# Patient Record
Sex: MALE | Race: WHITE | NOT HISPANIC OR LATINO | Employment: UNEMPLOYED | URBAN - METROPOLITAN AREA
[De-identification: names, ages, dates, MRNs, and addresses within clinical notes are randomized per-mention and may not be internally consistent; named-entity substitution may affect disease eponyms.]

---

## 2019-04-10 ENCOUNTER — APPOINTMENT (EMERGENCY)
Dept: RADIOLOGY | Facility: HOSPITAL | Age: 59
DRG: 263 | End: 2019-04-10
Payer: COMMERCIAL

## 2019-04-10 ENCOUNTER — HOSPITAL ENCOUNTER (INPATIENT)
Facility: HOSPITAL | Age: 59
LOS: 7 days | Discharge: HOME | DRG: 263 | End: 2019-04-18
Attending: EMERGENCY MEDICINE | Admitting: FAMILY MEDICINE
Payer: COMMERCIAL

## 2019-04-10 DIAGNOSIS — R10.9 ABDOMINAL PAIN: ICD-10-CM

## 2019-04-10 DIAGNOSIS — R11.2 NAUSEA AND VOMITING: ICD-10-CM

## 2019-04-10 DIAGNOSIS — K85.10 GALLSTONE PANCREATITIS: ICD-10-CM

## 2019-04-10 DIAGNOSIS — K85.90 PANCREATITIS: Primary | ICD-10-CM

## 2019-04-10 LAB
ALBUMIN SERPL BCP-MCNC: 3.9 G/DL (ref 3.5–5)
ALP SERPL-CCNC: 394 U/L (ref 46–116)
ALT SERPL W P-5'-P-CCNC: 582 U/L (ref 12–78)
ANION GAP SERPL CALCULATED.3IONS-SCNC: 13 MMOL/L (ref 4–13)
AST SERPL W P-5'-P-CCNC: 394 U/L (ref 5–45)
BASOPHILS # BLD AUTO: 0.03 THOUSANDS/ΜL (ref 0–0.1)
BASOPHILS NFR BLD AUTO: 0 % (ref 0–1)
BILIRUB SERPL-MCNC: 4.2 MG/DL (ref 0.2–1)
BUN SERPL-MCNC: 18 MG/DL (ref 5–25)
CALCIUM SERPL-MCNC: 9.7 MG/DL (ref 8.3–10.1)
CHLORIDE SERPL-SCNC: 103 MMOL/L (ref 100–108)
CO2 SERPL-SCNC: 26 MMOL/L (ref 21–32)
CREAT SERPL-MCNC: 1.3 MG/DL (ref 0.6–1.3)
EOSINOPHIL # BLD AUTO: 0.17 THOUSAND/ΜL (ref 0–0.61)
EOSINOPHIL NFR BLD AUTO: 1 % (ref 0–6)
ERYTHROCYTE [DISTWIDTH] IN BLOOD BY AUTOMATED COUNT: 15.3 % (ref 11.6–15.1)
GFR SERPL CREATININE-BSD FRML MDRD: 60 ML/MIN/1.73SQ M
GLUCOSE SERPL-MCNC: 113 MG/DL (ref 65–140)
HCT VFR BLD AUTO: 45.8 % (ref 36.5–49.3)
HGB BLD-MCNC: 15.8 G/DL (ref 12–17)
LACTATE SERPL-SCNC: 2.4 MMOL/L (ref 0.5–2)
LIPASE SERPL-CCNC: ABNORMAL U/L (ref 73–393)
LYMPHOCYTES # BLD AUTO: 4.46 THOUSANDS/ΜL (ref 0.6–4.47)
LYMPHOCYTES NFR BLD AUTO: 37 % (ref 14–44)
MCH RBC QN AUTO: 30 PG (ref 26.8–34.3)
MCHC RBC AUTO-ENTMCNC: 34.5 G/DL (ref 31.4–37.4)
MCV RBC AUTO: 87 FL (ref 82–98)
MONOCYTES # BLD AUTO: 1.01 THOUSAND/ΜL (ref 0.17–1.22)
MONOCYTES NFR BLD AUTO: 8 % (ref 4–12)
NEUTROPHILS # BLD AUTO: 6.43 THOUSANDS/ΜL (ref 1.85–7.62)
NEUTS SEG NFR BLD AUTO: 54 % (ref 43–75)
PLATELET # BLD AUTO: 286 THOUSANDS/UL (ref 149–390)
PMV BLD AUTO: 9.7 FL (ref 8.9–12.7)
POTASSIUM SERPL-SCNC: 3.2 MMOL/L (ref 3.5–5.3)
PROT SERPL-MCNC: 7.6 G/DL (ref 6.4–8.2)
RBC # BLD AUTO: 5.27 MILLION/UL (ref 3.88–5.62)
SODIUM SERPL-SCNC: 142 MMOL/L (ref 136–145)
TROPONIN I SERPL-MCNC: <0.02 NG/ML
WBC # BLD AUTO: 12.1 THOUSAND/UL (ref 4.31–10.16)

## 2019-04-10 PROCEDURE — 99285 EMERGENCY DEPT VISIT HI MDM: CPT

## 2019-04-10 PROCEDURE — 84484 ASSAY OF TROPONIN QUANT: CPT | Performed by: EMERGENCY MEDICINE

## 2019-04-10 PROCEDURE — 83690 ASSAY OF LIPASE: CPT | Performed by: EMERGENCY MEDICINE

## 2019-04-10 PROCEDURE — 96375 TX/PRO/DX INJ NEW DRUG ADDON: CPT

## 2019-04-10 PROCEDURE — 36415 COLL VENOUS BLD VENIPUNCTURE: CPT | Performed by: EMERGENCY MEDICINE

## 2019-04-10 PROCEDURE — 36415 COLL VENOUS BLD VENIPUNCTURE: CPT

## 2019-04-10 PROCEDURE — 83615 LACTATE (LD) (LDH) ENZYME: CPT | Performed by: STUDENT IN AN ORGANIZED HEALTH CARE EDUCATION/TRAINING PROGRAM

## 2019-04-10 PROCEDURE — 80053 COMPREHEN METABOLIC PANEL: CPT | Performed by: EMERGENCY MEDICINE

## 2019-04-10 PROCEDURE — 74177 CT ABD & PELVIS W/CONTRAST: CPT

## 2019-04-10 PROCEDURE — 80061 LIPID PANEL: CPT | Performed by: STUDENT IN AN ORGANIZED HEALTH CARE EDUCATION/TRAINING PROGRAM

## 2019-04-10 PROCEDURE — 96374 THER/PROPH/DIAG INJ IV PUSH: CPT

## 2019-04-10 PROCEDURE — 83036 HEMOGLOBIN GLYCOSYLATED A1C: CPT | Performed by: STUDENT IN AN ORGANIZED HEALTH CARE EDUCATION/TRAINING PROGRAM

## 2019-04-10 PROCEDURE — 93005 ELECTROCARDIOGRAM TRACING: CPT

## 2019-04-10 PROCEDURE — 83605 ASSAY OF LACTIC ACID: CPT | Performed by: EMERGENCY MEDICINE

## 2019-04-10 PROCEDURE — 85025 COMPLETE CBC W/AUTO DIFF WBC: CPT | Performed by: EMERGENCY MEDICINE

## 2019-04-10 PROCEDURE — 96361 HYDRATE IV INFUSION ADD-ON: CPT

## 2019-04-10 RX ORDER — KETOROLAC TROMETHAMINE 30 MG/ML
15 INJECTION, SOLUTION INTRAMUSCULAR; INTRAVENOUS ONCE
Status: COMPLETED | OUTPATIENT
Start: 2019-04-10 | End: 2019-04-10

## 2019-04-10 RX ORDER — ONDANSETRON 2 MG/ML
4 INJECTION INTRAMUSCULAR; INTRAVENOUS ONCE
Status: COMPLETED | OUTPATIENT
Start: 2019-04-10 | End: 2019-04-10

## 2019-04-10 RX ORDER — MORPHINE SULFATE 4 MG/ML
4 INJECTION, SOLUTION INTRAMUSCULAR; INTRAVENOUS ONCE
Status: COMPLETED | OUTPATIENT
Start: 2019-04-10 | End: 2019-04-10

## 2019-04-10 RX ORDER — ONDANSETRON 2 MG/ML
INJECTION INTRAMUSCULAR; INTRAVENOUS
Status: COMPLETED
Start: 2019-04-10 | End: 2019-04-10

## 2019-04-10 RX ORDER — HYDROMORPHONE HCL/PF 1 MG/ML
1 SYRINGE (ML) INJECTION ONCE
Status: COMPLETED | OUTPATIENT
Start: 2019-04-11 | End: 2019-04-11

## 2019-04-10 RX ADMIN — SODIUM CHLORIDE 1000 ML: 0.9 INJECTION, SOLUTION INTRAVENOUS at 22:06

## 2019-04-10 RX ADMIN — ONDANSETRON 4 MG: 2 INJECTION INTRAMUSCULAR; INTRAVENOUS at 22:05

## 2019-04-10 RX ADMIN — KETOROLAC TROMETHAMINE 15 MG: 30 INJECTION, SOLUTION INTRAMUSCULAR at 22:31

## 2019-04-10 RX ADMIN — IOHEXOL 100 ML: 350 INJECTION, SOLUTION INTRAVENOUS at 23:02

## 2019-04-10 RX ADMIN — MORPHINE SULFATE 4 MG: 4 INJECTION INTRAVENOUS at 22:32

## 2019-04-11 ENCOUNTER — APPOINTMENT (INPATIENT)
Dept: RADIOLOGY | Facility: HOSPITAL | Age: 59
DRG: 263 | End: 2019-04-11
Payer: COMMERCIAL

## 2019-04-11 PROBLEM — K92.0 HEMATEMESIS: Status: ACTIVE | Noted: 2019-04-11

## 2019-04-11 PROBLEM — R00.1 BRADYCARDIA: Status: ACTIVE | Noted: 2019-04-11

## 2019-04-11 PROBLEM — K85.10 GALLSTONE PANCREATITIS: Status: ACTIVE | Noted: 2019-04-10

## 2019-04-11 PROBLEM — K85.90 PANCREATITIS: Status: ACTIVE | Noted: 2019-04-11

## 2019-04-11 LAB
ALBUMIN SERPL BCP-MCNC: 3.1 G/DL (ref 3.5–5)
ALP SERPL-CCNC: 321 U/L (ref 46–116)
ALT SERPL W P-5'-P-CCNC: 478 U/L (ref 12–78)
ANION GAP SERPL CALCULATED.3IONS-SCNC: 8 MMOL/L (ref 4–13)
AST SERPL W P-5'-P-CCNC: 288 U/L (ref 5–45)
BASOPHILS # BLD AUTO: 0.01 THOUSANDS/ΜL (ref 0–0.1)
BASOPHILS NFR BLD AUTO: 0 % (ref 0–1)
BILIRUB SERPL-MCNC: 3.6 MG/DL (ref 0.2–1)
BILIRUB UR QL STRIP: NEGATIVE
BUN SERPL-MCNC: 17 MG/DL (ref 5–25)
CALCIUM SERPL-MCNC: 8.6 MG/DL (ref 8.3–10.1)
CHLORIDE SERPL-SCNC: 107 MMOL/L (ref 100–108)
CHOLEST SERPL-MCNC: 237 MG/DL (ref 50–200)
CLARITY UR: CLEAR
CO2 SERPL-SCNC: 26 MMOL/L (ref 21–32)
COLOR UR: YELLOW
CREAT SERPL-MCNC: 1.21 MG/DL (ref 0.6–1.3)
EOSINOPHIL # BLD AUTO: 0 THOUSAND/ΜL (ref 0–0.61)
EOSINOPHIL NFR BLD AUTO: 0 % (ref 0–6)
ERYTHROCYTE [DISTWIDTH] IN BLOOD BY AUTOMATED COUNT: 14.8 % (ref 11.6–15.1)
EST. AVERAGE GLUCOSE BLD GHB EST-MCNC: 103 MG/DL
GFR SERPL CREATININE-BSD FRML MDRD: 65 ML/MIN/1.73SQ M
GLUCOSE SERPL-MCNC: 121 MG/DL (ref 65–140)
GLUCOSE UR STRIP-MCNC: NEGATIVE MG/DL
HBA1C MFR BLD: 5.2 % (ref 4.2–6.3)
HCT VFR BLD AUTO: 44.5 % (ref 36.5–49.3)
HDLC SERPL-MCNC: 34 MG/DL (ref 40–60)
HGB BLD-MCNC: 14.3 G/DL (ref 12–17)
HGB UR QL STRIP.AUTO: NEGATIVE
IMM GRANULOCYTES # BLD AUTO: 0.03 THOUSAND/UL (ref 0–0.2)
IMM GRANULOCYTES NFR BLD AUTO: 0 % (ref 0–2)
KETONES UR STRIP-MCNC: ABNORMAL MG/DL
LACTATE SERPL-SCNC: 1.4 MMOL/L (ref 0.5–2)
LDH SERPL-CCNC: 313 U/L (ref 81–234)
LDLC SERPL CALC-MCNC: 181 MG/DL (ref 0–100)
LEUKOCYTE ESTERASE UR QL STRIP: NEGATIVE
LIPASE SERPL-CCNC: ABNORMAL U/L (ref 73–393)
LYMPHOCYTES # BLD AUTO: 1.06 THOUSANDS/ΜL (ref 0.6–4.47)
LYMPHOCYTES NFR BLD AUTO: 9 % (ref 14–44)
MAGNESIUM SERPL-MCNC: 2 MG/DL (ref 1.6–2.6)
MCH RBC QN AUTO: 29.4 PG (ref 26.8–34.3)
MCHC RBC AUTO-ENTMCNC: 32.1 G/DL (ref 31.4–37.4)
MCV RBC AUTO: 92 FL (ref 82–98)
MONOCYTES # BLD AUTO: 0.75 THOUSAND/ΜL (ref 0.17–1.22)
MONOCYTES NFR BLD AUTO: 6 % (ref 4–12)
NEUTROPHILS # BLD AUTO: 10.32 THOUSANDS/ΜL (ref 1.85–7.62)
NEUTS SEG NFR BLD AUTO: 85 % (ref 43–75)
NITRITE UR QL STRIP: NEGATIVE
NRBC BLD AUTO-RTO: 0 /100 WBCS
PH UR STRIP.AUTO: 6.5 [PH]
PHOSPHATE SERPL-MCNC: 4 MG/DL (ref 2.7–4.5)
PLATELET # BLD AUTO: 204 THOUSANDS/UL (ref 149–390)
PMV BLD AUTO: 10 FL (ref 8.9–12.7)
POTASSIUM SERPL-SCNC: 4.8 MMOL/L (ref 3.5–5.3)
PROT SERPL-MCNC: 6.3 G/DL (ref 6.4–8.2)
PROT UR STRIP-MCNC: NEGATIVE MG/DL
RBC # BLD AUTO: 4.86 MILLION/UL (ref 3.88–5.62)
SODIUM SERPL-SCNC: 141 MMOL/L (ref 136–145)
SP GR UR STRIP.AUTO: <=1.005 (ref 1–1.03)
TRIGL SERPL-MCNC: 109 MG/DL
UROBILINOGEN UR QL STRIP.AUTO: 1 E.U./DL
WBC # BLD AUTO: 12.17 THOUSAND/UL (ref 4.31–10.16)

## 2019-04-11 PROCEDURE — 83735 ASSAY OF MAGNESIUM: CPT | Performed by: STUDENT IN AN ORGANIZED HEALTH CARE EDUCATION/TRAINING PROGRAM

## 2019-04-11 PROCEDURE — 81003 URINALYSIS AUTO W/O SCOPE: CPT | Performed by: EMERGENCY MEDICINE

## 2019-04-11 PROCEDURE — 84100 ASSAY OF PHOSPHORUS: CPT | Performed by: STUDENT IN AN ORGANIZED HEALTH CARE EDUCATION/TRAINING PROGRAM

## 2019-04-11 PROCEDURE — 99253 IP/OBS CNSLTJ NEW/EST LOW 45: CPT | Performed by: FAMILY MEDICINE

## 2019-04-11 PROCEDURE — 87081 CULTURE SCREEN ONLY: CPT | Performed by: STUDENT IN AN ORGANIZED HEALTH CARE EDUCATION/TRAINING PROGRAM

## 2019-04-11 PROCEDURE — 80053 COMPREHEN METABOLIC PANEL: CPT | Performed by: STUDENT IN AN ORGANIZED HEALTH CARE EDUCATION/TRAINING PROGRAM

## 2019-04-11 PROCEDURE — 83605 ASSAY OF LACTIC ACID: CPT | Performed by: EMERGENCY MEDICINE

## 2019-04-11 PROCEDURE — 36415 COLL VENOUS BLD VENIPUNCTURE: CPT | Performed by: EMERGENCY MEDICINE

## 2019-04-11 PROCEDURE — 85025 COMPLETE CBC W/AUTO DIFF WBC: CPT | Performed by: STUDENT IN AN ORGANIZED HEALTH CARE EDUCATION/TRAINING PROGRAM

## 2019-04-11 PROCEDURE — 76705 ECHO EXAM OF ABDOMEN: CPT

## 2019-04-11 PROCEDURE — 99222 1ST HOSP IP/OBS MODERATE 55: CPT | Performed by: INTERNAL MEDICINE

## 2019-04-11 PROCEDURE — 83690 ASSAY OF LIPASE: CPT | Performed by: STUDENT IN AN ORGANIZED HEALTH CARE EDUCATION/TRAINING PROGRAM

## 2019-04-11 RX ORDER — POTASSIUM CHLORIDE 14.9 MG/ML
20 INJECTION INTRAVENOUS
Status: COMPLETED | OUTPATIENT
Start: 2019-04-11 | End: 2019-04-11

## 2019-04-11 RX ORDER — SACCHAROMYCES BOULARDII 250 MG
250 CAPSULE ORAL 2 TIMES DAILY
Status: DISCONTINUED | OUTPATIENT
Start: 2019-04-11 | End: 2019-04-18 | Stop reason: HOSPADM

## 2019-04-11 RX ORDER — CIPROFLOXACIN 2 MG/ML
400 INJECTION, SOLUTION INTRAVENOUS EVERY 12 HOURS
Status: DISCONTINUED | OUTPATIENT
Start: 2019-04-11 | End: 2019-04-15

## 2019-04-11 RX ORDER — IBUPROFEN 400 MG/1
400 TABLET ORAL DAILY PRN
COMMUNITY
End: 2019-04-18 | Stop reason: HOSPADM

## 2019-04-11 RX ORDER — ONDANSETRON 2 MG/ML
4 INJECTION INTRAMUSCULAR; INTRAVENOUS EVERY 6 HOURS PRN
Status: DISCONTINUED | OUTPATIENT
Start: 2019-04-11 | End: 2019-04-11

## 2019-04-11 RX ORDER — DIPHENOXYLATE HYDROCHLORIDE AND ATROPINE SULFATE 2.5; .025 MG/1; MG/1
1 TABLET ORAL 2 TIMES WEEKLY
COMMUNITY

## 2019-04-11 RX ORDER — ONDANSETRON 2 MG/ML
4 INJECTION INTRAMUSCULAR; INTRAVENOUS EVERY 4 HOURS PRN
Status: DISCONTINUED | OUTPATIENT
Start: 2019-04-11 | End: 2019-04-18 | Stop reason: HOSPADM

## 2019-04-11 RX ORDER — SODIUM CHLORIDE, SODIUM LACTATE, POTASSIUM CHLORIDE, CALCIUM CHLORIDE 600; 310; 30; 20 MG/100ML; MG/100ML; MG/100ML; MG/100ML
250 INJECTION, SOLUTION INTRAVENOUS CONTINUOUS
Status: DISCONTINUED | OUTPATIENT
Start: 2019-04-11 | End: 2019-04-12

## 2019-04-11 RX ADMIN — CIPROFLOXACIN 400 MG: 2 INJECTION, SOLUTION INTRAVENOUS at 14:03

## 2019-04-11 RX ADMIN — POTASSIUM CHLORIDE 20 MEQ: 200 INJECTION, SOLUTION INTRAVENOUS at 03:44

## 2019-04-11 RX ADMIN — SODIUM CHLORIDE, SODIUM LACTATE, POTASSIUM CHLORIDE, AND CALCIUM CHLORIDE 250 ML/HR: .6; .31; .03; .02 INJECTION, SOLUTION INTRAVENOUS at 01:32

## 2019-04-11 RX ADMIN — ONDANSETRON 4 MG: 2 INJECTION INTRAMUSCULAR; INTRAVENOUS at 05:25

## 2019-04-11 RX ADMIN — METRONIDAZOLE 500 MG: 500 INJECTION, SOLUTION INTRAVENOUS at 15:44

## 2019-04-11 RX ADMIN — SODIUM CHLORIDE, SODIUM LACTATE, POTASSIUM CHLORIDE, AND CALCIUM CHLORIDE 250 ML/HR: .6; .31; .03; .02 INJECTION, SOLUTION INTRAVENOUS at 19:54

## 2019-04-11 RX ADMIN — METRONIDAZOLE 500 MG: 500 INJECTION, SOLUTION INTRAVENOUS at 22:22

## 2019-04-11 RX ADMIN — ONDANSETRON 4 MG: 2 INJECTION INTRAMUSCULAR; INTRAVENOUS at 18:55

## 2019-04-11 RX ADMIN — HYDROMORPHONE HYDROCHLORIDE 1 MG: 1 INJECTION, SOLUTION INTRAMUSCULAR; INTRAVENOUS; SUBCUTANEOUS at 00:09

## 2019-04-11 RX ADMIN — MORPHINE SULFATE 2 MG: 2 INJECTION, SOLUTION INTRAMUSCULAR; INTRAVENOUS at 05:29

## 2019-04-11 RX ADMIN — Medication 250 MG: at 18:55

## 2019-04-11 RX ADMIN — MORPHINE SULFATE 2 MG: 2 INJECTION, SOLUTION INTRAMUSCULAR; INTRAVENOUS at 01:59

## 2019-04-11 RX ADMIN — MORPHINE SULFATE 2 MG: 2 INJECTION, SOLUTION INTRAMUSCULAR; INTRAVENOUS at 15:42

## 2019-04-11 RX ADMIN — POTASSIUM CHLORIDE 20 MEQ: 200 INJECTION, SOLUTION INTRAVENOUS at 05:25

## 2019-04-11 RX ADMIN — SODIUM CHLORIDE, SODIUM LACTATE, POTASSIUM CHLORIDE, AND CALCIUM CHLORIDE 250 ML/HR: .6; .31; .03; .02 INJECTION, SOLUTION INTRAVENOUS at 09:40

## 2019-04-11 RX ADMIN — ONDANSETRON 4 MG: 2 INJECTION INTRAMUSCULAR; INTRAVENOUS at 10:15

## 2019-04-11 RX ADMIN — SODIUM CHLORIDE, SODIUM LACTATE, POTASSIUM CHLORIDE, AND CALCIUM CHLORIDE 250 ML/HR: .6; .31; .03; .02 INJECTION, SOLUTION INTRAVENOUS at 14:04

## 2019-04-11 RX ADMIN — FAMOTIDINE 20 MG: 10 INJECTION, SOLUTION INTRAVENOUS at 16:56

## 2019-04-11 RX ADMIN — MORPHINE SULFATE 2 MG: 2 INJECTION, SOLUTION INTRAMUSCULAR; INTRAVENOUS at 11:22

## 2019-04-11 RX ADMIN — SODIUM CHLORIDE, SODIUM LACTATE, POTASSIUM CHLORIDE, AND CALCIUM CHLORIDE 250 ML/HR: .6; .31; .03; .02 INJECTION, SOLUTION INTRAVENOUS at 05:25

## 2019-04-11 RX ADMIN — MORPHINE SULFATE 2 MG: 2 INJECTION, SOLUTION INTRAMUSCULAR; INTRAVENOUS at 20:17

## 2019-04-11 RX ADMIN — POTASSIUM CHLORIDE 20 MEQ: 200 INJECTION, SOLUTION INTRAVENOUS at 01:36

## 2019-04-12 ENCOUNTER — APPOINTMENT (INPATIENT)
Dept: RADIOLOGY | Facility: HOSPITAL | Age: 59
DRG: 263 | End: 2019-04-12
Payer: COMMERCIAL

## 2019-04-12 LAB
ALBUMIN SERPL BCP-MCNC: 2.6 G/DL (ref 3.5–5)
ALP SERPL-CCNC: 248 U/L (ref 46–116)
ALT SERPL W P-5'-P-CCNC: 328 U/L (ref 12–78)
ANION GAP SERPL CALCULATED.3IONS-SCNC: 7 MMOL/L (ref 4–13)
AST SERPL W P-5'-P-CCNC: 127 U/L (ref 5–45)
ATRIAL RATE: 53 BPM
BASOPHILS # BLD MANUAL: 0 THOUSAND/UL (ref 0–0.1)
BASOPHILS NFR MAR MANUAL: 0 % (ref 0–1)
BILIRUB SERPL-MCNC: 2.6 MG/DL (ref 0.2–1)
BUN SERPL-MCNC: 16 MG/DL (ref 5–25)
CALCIUM SERPL-MCNC: 8.2 MG/DL (ref 8.3–10.1)
CHLORIDE SERPL-SCNC: 104 MMOL/L (ref 100–108)
CO2 SERPL-SCNC: 26 MMOL/L (ref 21–32)
CREAT SERPL-MCNC: 1.07 MG/DL (ref 0.6–1.3)
EOSINOPHIL # BLD MANUAL: 0 THOUSAND/UL (ref 0–0.4)
EOSINOPHIL NFR BLD MANUAL: 0 % (ref 0–6)
ERYTHROCYTE [DISTWIDTH] IN BLOOD BY AUTOMATED COUNT: 15.2 % (ref 11.6–15.1)
GFR SERPL CREATININE-BSD FRML MDRD: 76 ML/MIN/1.73SQ M
GLUCOSE SERPL-MCNC: 112 MG/DL (ref 65–140)
HCT VFR BLD AUTO: 45.6 % (ref 36.5–49.3)
HGB BLD-MCNC: 14.7 G/DL (ref 12–17)
LIPASE SERPL-CCNC: 4866 U/L (ref 73–393)
LYMPHOCYTES # BLD AUTO: 1 THOUSAND/UL (ref 0.6–4.47)
LYMPHOCYTES # BLD AUTO: 4 % (ref 14–44)
MAGNESIUM SERPL-MCNC: 1.7 MG/DL (ref 1.6–2.6)
MCH RBC QN AUTO: 29.4 PG (ref 26.8–34.3)
MCHC RBC AUTO-ENTMCNC: 32.2 G/DL (ref 31.4–37.4)
MCV RBC AUTO: 91 FL (ref 82–98)
METAMYELOCYTES NFR BLD MANUAL: 2 % (ref 0–1)
MONOCYTES # BLD AUTO: 1.5 THOUSAND/UL (ref 0–1.22)
MONOCYTES NFR BLD: 6 % (ref 4–12)
MRSA NOSE QL CULT: NORMAL
NEUTROPHILS # BLD MANUAL: 20.95 THOUSAND/UL (ref 1.85–7.62)
NEUTS BAND NFR BLD MANUAL: 11 % (ref 0–8)
NEUTS SEG NFR BLD AUTO: 73 % (ref 43–75)
NRBC BLD AUTO-RTO: 0 /100 WBCS
P AXIS: 26 DEGREES
PHOSPHATE SERPL-MCNC: 3 MG/DL (ref 2.7–4.5)
PLATELET # BLD AUTO: 200 THOUSANDS/UL (ref 149–390)
PLATELET BLD QL SMEAR: ADEQUATE
PMV BLD AUTO: 10.8 FL (ref 8.9–12.7)
POTASSIUM SERPL-SCNC: 4.4 MMOL/L (ref 3.5–5.3)
PR INTERVAL: 148 MS
PROT SERPL-MCNC: 5.8 G/DL (ref 6.4–8.2)
QRS AXIS: -32 DEGREES
QRSD INTERVAL: 92 MS
QT INTERVAL: 474 MS
QTC INTERVAL: 444 MS
RBC # BLD AUTO: 5 MILLION/UL (ref 3.88–5.62)
SODIUM SERPL-SCNC: 137 MMOL/L (ref 136–145)
T WAVE AXIS: 2 DEGREES
TOTAL CELLS COUNTED SPEC: 100
VARIANT LYMPHS # BLD AUTO: 4 %
VENTRICULAR RATE: 53 BPM
WBC # BLD AUTO: 24.94 THOUSAND/UL (ref 4.31–10.16)

## 2019-04-12 PROCEDURE — 85007 BL SMEAR W/DIFF WBC COUNT: CPT | Performed by: STUDENT IN AN ORGANIZED HEALTH CARE EDUCATION/TRAINING PROGRAM

## 2019-04-12 PROCEDURE — 99232 SBSQ HOSP IP/OBS MODERATE 35: CPT | Performed by: INTERNAL MEDICINE

## 2019-04-12 PROCEDURE — 80053 COMPREHEN METABOLIC PANEL: CPT | Performed by: STUDENT IN AN ORGANIZED HEALTH CARE EDUCATION/TRAINING PROGRAM

## 2019-04-12 PROCEDURE — 84100 ASSAY OF PHOSPHORUS: CPT | Performed by: STUDENT IN AN ORGANIZED HEALTH CARE EDUCATION/TRAINING PROGRAM

## 2019-04-12 PROCEDURE — 83735 ASSAY OF MAGNESIUM: CPT | Performed by: STUDENT IN AN ORGANIZED HEALTH CARE EDUCATION/TRAINING PROGRAM

## 2019-04-12 PROCEDURE — 83690 ASSAY OF LIPASE: CPT | Performed by: STUDENT IN AN ORGANIZED HEALTH CARE EDUCATION/TRAINING PROGRAM

## 2019-04-12 PROCEDURE — 74183 MRI ABD W/O CNTR FLWD CNTR: CPT

## 2019-04-12 PROCEDURE — 93010 ELECTROCARDIOGRAM REPORT: CPT | Performed by: INTERNAL MEDICINE

## 2019-04-12 PROCEDURE — A9585 GADOBUTROL INJECTION: HCPCS | Performed by: PHYSICIAN ASSISTANT

## 2019-04-12 PROCEDURE — 85027 COMPLETE CBC AUTOMATED: CPT | Performed by: STUDENT IN AN ORGANIZED HEALTH CARE EDUCATION/TRAINING PROGRAM

## 2019-04-12 PROCEDURE — 99254 IP/OBS CNSLTJ NEW/EST MOD 60: CPT | Performed by: SURGERY

## 2019-04-12 PROCEDURE — 99232 SBSQ HOSP IP/OBS MODERATE 35: CPT | Performed by: FAMILY MEDICINE

## 2019-04-12 RX ORDER — PANTOPRAZOLE SODIUM 40 MG/1
40 TABLET, DELAYED RELEASE ORAL
Status: DISCONTINUED | OUTPATIENT
Start: 2019-04-12 | End: 2019-04-18 | Stop reason: HOSPADM

## 2019-04-12 RX ORDER — DEXTROSE, SODIUM CHLORIDE, SODIUM LACTATE, POTASSIUM CHLORIDE, AND CALCIUM CHLORIDE 5; .6; .31; .03; .02 G/100ML; G/100ML; G/100ML; G/100ML; G/100ML
250 INJECTION, SOLUTION INTRAVENOUS CONTINUOUS
Status: DISCONTINUED | OUTPATIENT
Start: 2019-04-12 | End: 2019-04-13

## 2019-04-12 RX ADMIN — ONDANSETRON 4 MG: 2 INJECTION INTRAMUSCULAR; INTRAVENOUS at 23:05

## 2019-04-12 RX ADMIN — METRONIDAZOLE 500 MG: 500 INJECTION, SOLUTION INTRAVENOUS at 21:16

## 2019-04-12 RX ADMIN — Medication 250 MG: at 17:21

## 2019-04-12 RX ADMIN — DEXTROSE, SODIUM CHLORIDE, SODIUM LACTATE, POTASSIUM CHLORIDE, AND CALCIUM CHLORIDE 250 ML/HR: 5; .6; .31; .03; .02 INJECTION, SOLUTION INTRAVENOUS at 23:06

## 2019-04-12 RX ADMIN — Medication 250 MG: at 10:31

## 2019-04-12 RX ADMIN — METRONIDAZOLE 500 MG: 500 INJECTION, SOLUTION INTRAVENOUS at 06:27

## 2019-04-12 RX ADMIN — DEXTROSE, SODIUM CHLORIDE, SODIUM LACTATE, POTASSIUM CHLORIDE, AND CALCIUM CHLORIDE 200 ML/HR: 5; .6; .31; .03; .02 INJECTION, SOLUTION INTRAVENOUS at 11:32

## 2019-04-12 RX ADMIN — CIPROFLOXACIN 400 MG: 2 INJECTION, SOLUTION INTRAVENOUS at 13:32

## 2019-04-12 RX ADMIN — CIPROFLOXACIN 400 MG: 2 INJECTION, SOLUTION INTRAVENOUS at 01:52

## 2019-04-12 RX ADMIN — PANTOPRAZOLE SODIUM 40 MG: 40 TABLET, DELAYED RELEASE ORAL at 17:21

## 2019-04-12 RX ADMIN — MORPHINE SULFATE 2 MG: 2 INJECTION, SOLUTION INTRAMUSCULAR; INTRAVENOUS at 04:07

## 2019-04-12 RX ADMIN — GADOBUTROL 10 ML: 604.72 INJECTION INTRAVENOUS at 09:28

## 2019-04-12 RX ADMIN — MORPHINE SULFATE 1 MG: 2 INJECTION, SOLUTION INTRAMUSCULAR; INTRAVENOUS at 17:55

## 2019-04-12 RX ADMIN — SODIUM CHLORIDE, SODIUM LACTATE, POTASSIUM CHLORIDE, AND CALCIUM CHLORIDE 250 ML/HR: .6; .31; .03; .02 INJECTION, SOLUTION INTRAVENOUS at 06:22

## 2019-04-12 RX ADMIN — METRONIDAZOLE 500 MG: 500 INJECTION, SOLUTION INTRAVENOUS at 15:05

## 2019-04-12 RX ADMIN — FAMOTIDINE 20 MG: 10 INJECTION, SOLUTION INTRAVENOUS at 02:18

## 2019-04-13 ENCOUNTER — APPOINTMENT (INPATIENT)
Dept: RADIOLOGY | Facility: HOSPITAL | Age: 59
DRG: 263 | End: 2019-04-13
Payer: COMMERCIAL

## 2019-04-13 LAB
ALBUMIN SERPL BCP-MCNC: 2.2 G/DL (ref 3.5–5)
ALP SERPL-CCNC: 222 U/L (ref 46–116)
ALT SERPL W P-5'-P-CCNC: 199 U/L (ref 12–78)
ANION GAP SERPL CALCULATED.3IONS-SCNC: 7 MMOL/L (ref 4–13)
AST SERPL W P-5'-P-CCNC: 57 U/L (ref 5–45)
BASOPHILS # BLD AUTO: 0.04 THOUSANDS/ΜL (ref 0–0.1)
BASOPHILS NFR BLD AUTO: 0 % (ref 0–1)
BILIRUB SERPL-MCNC: 3 MG/DL (ref 0.2–1)
BUN SERPL-MCNC: 15 MG/DL (ref 5–25)
CALCIUM SERPL-MCNC: 8.1 MG/DL (ref 8.3–10.1)
CHLORIDE SERPL-SCNC: 103 MMOL/L (ref 100–108)
CO2 SERPL-SCNC: 25 MMOL/L (ref 21–32)
CREAT SERPL-MCNC: 0.97 MG/DL (ref 0.6–1.3)
EOSINOPHIL # BLD AUTO: 0 THOUSAND/ΜL (ref 0–0.61)
EOSINOPHIL NFR BLD AUTO: 0 % (ref 0–6)
ERYTHROCYTE [DISTWIDTH] IN BLOOD BY AUTOMATED COUNT: 14.8 % (ref 11.6–15.1)
GFR SERPL CREATININE-BSD FRML MDRD: 85 ML/MIN/1.73SQ M
GLUCOSE SERPL-MCNC: 158 MG/DL (ref 65–140)
HCT VFR BLD AUTO: 44 % (ref 36.5–49.3)
HGB BLD-MCNC: 14.5 G/DL (ref 12–17)
IMM GRANULOCYTES # BLD AUTO: >0.5 THOUSAND/UL (ref 0–0.2)
IMM GRANULOCYTES NFR BLD AUTO: 2 % (ref 0–2)
LIPASE SERPL-CCNC: 1196 U/L (ref 73–393)
LYMPHOCYTES # BLD AUTO: 1.16 THOUSANDS/ΜL (ref 0.6–4.47)
LYMPHOCYTES NFR BLD AUTO: 5 % (ref 14–44)
MAGNESIUM SERPL-MCNC: 1.6 MG/DL (ref 1.6–2.6)
MCH RBC QN AUTO: 29.8 PG (ref 26.8–34.3)
MCHC RBC AUTO-ENTMCNC: 33 G/DL (ref 31.4–37.4)
MCV RBC AUTO: 90 FL (ref 82–98)
MONOCYTES # BLD AUTO: 1.04 THOUSAND/ΜL (ref 0.17–1.22)
MONOCYTES NFR BLD AUTO: 4 % (ref 4–12)
NEUTROPHILS # BLD AUTO: 21.01 THOUSANDS/ΜL (ref 1.85–7.62)
NEUTS SEG NFR BLD AUTO: 89 % (ref 43–75)
NRBC BLD AUTO-RTO: 0 /100 WBCS
PHOSPHATE SERPL-MCNC: 1.9 MG/DL (ref 2.7–4.5)
PLATELET # BLD AUTO: 176 THOUSANDS/UL (ref 149–390)
PMV BLD AUTO: 10.1 FL (ref 8.9–12.7)
POTASSIUM SERPL-SCNC: 3.5 MMOL/L (ref 3.5–5.3)
PROT SERPL-MCNC: 5.4 G/DL (ref 6.4–8.2)
RBC # BLD AUTO: 4.87 MILLION/UL (ref 3.88–5.62)
SODIUM SERPL-SCNC: 135 MMOL/L (ref 136–145)
WBC # BLD AUTO: 23.83 THOUSAND/UL (ref 4.31–10.16)

## 2019-04-13 PROCEDURE — 83735 ASSAY OF MAGNESIUM: CPT | Performed by: STUDENT IN AN ORGANIZED HEALTH CARE EDUCATION/TRAINING PROGRAM

## 2019-04-13 PROCEDURE — 80053 COMPREHEN METABOLIC PANEL: CPT | Performed by: STUDENT IN AN ORGANIZED HEALTH CARE EDUCATION/TRAINING PROGRAM

## 2019-04-13 PROCEDURE — 87040 BLOOD CULTURE FOR BACTERIA: CPT | Performed by: STUDENT IN AN ORGANIZED HEALTH CARE EDUCATION/TRAINING PROGRAM

## 2019-04-13 PROCEDURE — 83690 ASSAY OF LIPASE: CPT | Performed by: STUDENT IN AN ORGANIZED HEALTH CARE EDUCATION/TRAINING PROGRAM

## 2019-04-13 PROCEDURE — 85025 COMPLETE CBC W/AUTO DIFF WBC: CPT | Performed by: STUDENT IN AN ORGANIZED HEALTH CARE EDUCATION/TRAINING PROGRAM

## 2019-04-13 PROCEDURE — 84100 ASSAY OF PHOSPHORUS: CPT | Performed by: STUDENT IN AN ORGANIZED HEALTH CARE EDUCATION/TRAINING PROGRAM

## 2019-04-13 PROCEDURE — 74018 RADEX ABDOMEN 1 VIEW: CPT

## 2019-04-13 PROCEDURE — 99232 SBSQ HOSP IP/OBS MODERATE 35: CPT | Performed by: INTERNAL MEDICINE

## 2019-04-13 RX ORDER — LANOLIN ALCOHOL/MO/W.PET/CERES
3 CREAM (GRAM) TOPICAL
Status: DISCONTINUED | OUTPATIENT
Start: 2019-04-13 | End: 2019-04-18 | Stop reason: HOSPADM

## 2019-04-13 RX ORDER — DEXTROSE, SODIUM CHLORIDE, SODIUM LACTATE, POTASSIUM CHLORIDE, AND CALCIUM CHLORIDE 5; .6; .31; .03; .02 G/100ML; G/100ML; G/100ML; G/100ML; G/100ML
150 INJECTION, SOLUTION INTRAVENOUS CONTINUOUS
Status: DISCONTINUED | OUTPATIENT
Start: 2019-04-13 | End: 2019-04-15

## 2019-04-13 RX ORDER — MUSCLE RUB CREAM 100; 150 MG/G; MG/G
1 CREAM TOPICAL 4 TIMES DAILY PRN
Status: DISCONTINUED | OUTPATIENT
Start: 2019-04-13 | End: 2019-04-18 | Stop reason: HOSPADM

## 2019-04-13 RX ORDER — MAGNESIUM SULFATE HEPTAHYDRATE 40 MG/ML
2 INJECTION, SOLUTION INTRAVENOUS ONCE
Status: COMPLETED | OUTPATIENT
Start: 2019-04-13 | End: 2019-04-13

## 2019-04-13 RX ADMIN — Medication 250 MG: at 18:23

## 2019-04-13 RX ADMIN — CIPROFLOXACIN 400 MG: 2 INJECTION, SOLUTION INTRAVENOUS at 01:55

## 2019-04-13 RX ADMIN — METRONIDAZOLE 500 MG: 500 INJECTION, SOLUTION INTRAVENOUS at 22:24

## 2019-04-13 RX ADMIN — MAGNESIUM SULFATE HEPTAHYDRATE 2 G: 40 INJECTION, SOLUTION INTRAVENOUS at 10:37

## 2019-04-13 RX ADMIN — MORPHINE SULFATE 1 MG: 2 INJECTION, SOLUTION INTRAMUSCULAR; INTRAVENOUS at 16:53

## 2019-04-13 RX ADMIN — DEXTROSE, SODIUM CHLORIDE, SODIUM LACTATE, POTASSIUM CHLORIDE, AND CALCIUM CHLORIDE 200 ML/HR: 5; .6; .31; .03; .02 INJECTION, SOLUTION INTRAVENOUS at 23:40

## 2019-04-13 RX ADMIN — Medication 250 MG: at 09:26

## 2019-04-13 RX ADMIN — PANTOPRAZOLE SODIUM 40 MG: 40 TABLET, DELAYED RELEASE ORAL at 16:53

## 2019-04-13 RX ADMIN — METRONIDAZOLE 500 MG: 500 INJECTION, SOLUTION INTRAVENOUS at 06:44

## 2019-04-13 RX ADMIN — PANTOPRAZOLE SODIUM 40 MG: 40 TABLET, DELAYED RELEASE ORAL at 06:44

## 2019-04-13 RX ADMIN — DEXTROSE, SODIUM CHLORIDE, SODIUM LACTATE, POTASSIUM CHLORIDE, AND CALCIUM CHLORIDE 250 ML/HR: 5; .6; .31; .03; .02 INJECTION, SOLUTION INTRAVENOUS at 05:04

## 2019-04-13 RX ADMIN — DEXTROSE, SODIUM CHLORIDE, SODIUM LACTATE, POTASSIUM CHLORIDE, AND CALCIUM CHLORIDE 200 ML/HR: 5; .6; .31; .03; .02 INJECTION, SOLUTION INTRAVENOUS at 18:26

## 2019-04-13 RX ADMIN — CIPROFLOXACIN 400 MG: 2 INJECTION, SOLUTION INTRAVENOUS at 12:38

## 2019-04-13 RX ADMIN — POTASSIUM PHOSPHATE, MONOBASIC AND POTASSIUM PHOSPHATE, DIBASIC 15 MMOL: 224; 236 INJECTION, SOLUTION INTRAVENOUS at 15:11

## 2019-04-13 RX ADMIN — ENOXAPARIN SODIUM 40 MG: 40 INJECTION SUBCUTANEOUS at 12:40

## 2019-04-13 RX ADMIN — METRONIDAZOLE 500 MG: 500 INJECTION, SOLUTION INTRAVENOUS at 14:06

## 2019-04-14 ENCOUNTER — ANESTHESIA EVENT (INPATIENT)
Dept: PERIOP | Facility: HOSPITAL | Age: 59
DRG: 263 | End: 2019-04-14
Payer: COMMERCIAL

## 2019-04-14 ENCOUNTER — APPOINTMENT (INPATIENT)
Dept: RADIOLOGY | Facility: HOSPITAL | Age: 59
DRG: 263 | End: 2019-04-14
Payer: COMMERCIAL

## 2019-04-14 LAB
ALBUMIN SERPL BCP-MCNC: 1.9 G/DL (ref 3.5–5)
ALP SERPL-CCNC: 142 U/L (ref 46–116)
ALT SERPL W P-5'-P-CCNC: 105 U/L (ref 12–78)
ANION GAP SERPL CALCULATED.3IONS-SCNC: 8 MMOL/L (ref 4–13)
AST SERPL W P-5'-P-CCNC: 18 U/L (ref 5–45)
BASOPHILS # BLD AUTO: 0.02 THOUSANDS/ΜL (ref 0–0.1)
BASOPHILS NFR BLD AUTO: 0 % (ref 0–1)
BILIRUB SERPL-MCNC: 1.8 MG/DL (ref 0.2–1)
BUN SERPL-MCNC: 17 MG/DL (ref 5–25)
CALCIUM SERPL-MCNC: 8.1 MG/DL (ref 8.3–10.1)
CHLORIDE SERPL-SCNC: 104 MMOL/L (ref 100–108)
CO2 SERPL-SCNC: 24 MMOL/L (ref 21–32)
CREAT SERPL-MCNC: 0.94 MG/DL (ref 0.6–1.3)
EOSINOPHIL # BLD AUTO: 0.01 THOUSAND/ΜL (ref 0–0.61)
EOSINOPHIL NFR BLD AUTO: 0 % (ref 0–6)
ERYTHROCYTE [DISTWIDTH] IN BLOOD BY AUTOMATED COUNT: 14.6 % (ref 11.6–15.1)
GFR SERPL CREATININE-BSD FRML MDRD: 88 ML/MIN/1.73SQ M
GLUCOSE SERPL-MCNC: 161 MG/DL (ref 65–140)
HCT VFR BLD AUTO: 44.2 % (ref 36.5–49.3)
HGB BLD-MCNC: 14.7 G/DL (ref 12–17)
IMM GRANULOCYTES # BLD AUTO: 0.12 THOUSAND/UL (ref 0–0.2)
IMM GRANULOCYTES NFR BLD AUTO: 1 % (ref 0–2)
LIPASE SERPL-CCNC: 281 U/L (ref 73–393)
LYMPHOCYTES # BLD AUTO: 0.66 THOUSANDS/ΜL (ref 0.6–4.47)
LYMPHOCYTES NFR BLD AUTO: 4 % (ref 14–44)
MAGNESIUM SERPL-MCNC: 2 MG/DL (ref 1.6–2.6)
MCH RBC QN AUTO: 29.8 PG (ref 26.8–34.3)
MCHC RBC AUTO-ENTMCNC: 33.3 G/DL (ref 31.4–37.4)
MCV RBC AUTO: 90 FL (ref 82–98)
MONOCYTES # BLD AUTO: 1.33 THOUSAND/ΜL (ref 0.17–1.22)
MONOCYTES NFR BLD AUTO: 7 % (ref 4–12)
NEUTROPHILS # BLD AUTO: 16.95 THOUSANDS/ΜL (ref 1.85–7.62)
NEUTS SEG NFR BLD AUTO: 88 % (ref 43–75)
NRBC BLD AUTO-RTO: 0 /100 WBCS
PHOSPHATE SERPL-MCNC: 2.4 MG/DL (ref 2.7–4.5)
PLATELET # BLD AUTO: 175 THOUSANDS/UL (ref 149–390)
PMV BLD AUTO: 10 FL (ref 8.9–12.7)
POTASSIUM SERPL-SCNC: 3.3 MMOL/L (ref 3.5–5.3)
PROT SERPL-MCNC: 5.1 G/DL (ref 6.4–8.2)
RBC # BLD AUTO: 4.94 MILLION/UL (ref 3.88–5.62)
SODIUM SERPL-SCNC: 136 MMOL/L (ref 136–145)
WBC # BLD AUTO: 19.09 THOUSAND/UL (ref 4.31–10.16)

## 2019-04-14 PROCEDURE — NC001 PR NO CHARGE: Performed by: SURGERY

## 2019-04-14 PROCEDURE — 85025 COMPLETE CBC W/AUTO DIFF WBC: CPT | Performed by: STUDENT IN AN ORGANIZED HEALTH CARE EDUCATION/TRAINING PROGRAM

## 2019-04-14 PROCEDURE — 83735 ASSAY OF MAGNESIUM: CPT | Performed by: STUDENT IN AN ORGANIZED HEALTH CARE EDUCATION/TRAINING PROGRAM

## 2019-04-14 PROCEDURE — 74018 RADEX ABDOMEN 1 VIEW: CPT

## 2019-04-14 PROCEDURE — 80053 COMPREHEN METABOLIC PANEL: CPT | Performed by: STUDENT IN AN ORGANIZED HEALTH CARE EDUCATION/TRAINING PROGRAM

## 2019-04-14 PROCEDURE — 84100 ASSAY OF PHOSPHORUS: CPT | Performed by: STUDENT IN AN ORGANIZED HEALTH CARE EDUCATION/TRAINING PROGRAM

## 2019-04-14 PROCEDURE — 99232 SBSQ HOSP IP/OBS MODERATE 35: CPT | Performed by: INTERNAL MEDICINE

## 2019-04-14 PROCEDURE — 83690 ASSAY OF LIPASE: CPT | Performed by: STUDENT IN AN ORGANIZED HEALTH CARE EDUCATION/TRAINING PROGRAM

## 2019-04-14 RX ORDER — POTASSIUM CHLORIDE 14.9 MG/ML
20 INJECTION INTRAVENOUS ONCE
Status: COMPLETED | OUTPATIENT
Start: 2019-04-14 | End: 2019-04-14

## 2019-04-14 RX ORDER — POTASSIUM CHLORIDE 14.9 MG/ML
20 INJECTION INTRAVENOUS
Status: DISCONTINUED | OUTPATIENT
Start: 2019-04-14 | End: 2019-04-14

## 2019-04-14 RX ADMIN — MENTHOL, METHYL SALICYLATE 1 APPLICATION: 10; 15 CREAM TOPICAL at 01:46

## 2019-04-14 RX ADMIN — METRONIDAZOLE 500 MG: 500 INJECTION, SOLUTION INTRAVENOUS at 06:02

## 2019-04-14 RX ADMIN — CIPROFLOXACIN 400 MG: 2 INJECTION, SOLUTION INTRAVENOUS at 01:33

## 2019-04-14 RX ADMIN — POTASSIUM CHLORIDE 20 MEQ: 200 INJECTION, SOLUTION INTRAVENOUS at 18:51

## 2019-04-14 RX ADMIN — MENTHOL, METHYL SALICYLATE 1 APPLICATION: 10; 15 CREAM TOPICAL at 05:47

## 2019-04-14 RX ADMIN — METRONIDAZOLE 500 MG: 500 INJECTION, SOLUTION INTRAVENOUS at 22:27

## 2019-04-14 RX ADMIN — POTASSIUM CHLORIDE 20 MEQ: 200 INJECTION, SOLUTION INTRAVENOUS at 09:39

## 2019-04-14 RX ADMIN — PANTOPRAZOLE SODIUM 40 MG: 40 TABLET, DELAYED RELEASE ORAL at 15:28

## 2019-04-14 RX ADMIN — Medication 250 MG: at 17:53

## 2019-04-14 RX ADMIN — MORPHINE SULFATE 1 MG: 2 INJECTION, SOLUTION INTRAMUSCULAR; INTRAVENOUS at 00:07

## 2019-04-14 RX ADMIN — METRONIDAZOLE 500 MG: 500 INJECTION, SOLUTION INTRAVENOUS at 15:21

## 2019-04-14 RX ADMIN — POTASSIUM CHLORIDE 20 MEQ: 200 INJECTION, SOLUTION INTRAVENOUS at 16:38

## 2019-04-14 RX ADMIN — CIPROFLOXACIN 400 MG: 2 INJECTION, SOLUTION INTRAVENOUS at 14:15

## 2019-04-15 ENCOUNTER — APPOINTMENT (INPATIENT)
Dept: RADIOLOGY | Facility: HOSPITAL | Age: 59
DRG: 263 | End: 2019-04-15
Payer: COMMERCIAL

## 2019-04-15 ENCOUNTER — ANESTHESIA (INPATIENT)
Dept: PERIOP | Facility: HOSPITAL | Age: 59
DRG: 263 | End: 2019-04-15
Payer: COMMERCIAL

## 2019-04-15 ENCOUNTER — ANESTHESIA EVENT (INPATIENT)
Dept: PERIOP | Facility: HOSPITAL | Age: 59
DRG: 263 | End: 2019-04-15
Payer: COMMERCIAL

## 2019-04-15 LAB
ALBUMIN SERPL BCP-MCNC: 2 G/DL (ref 3.5–5)
ALP SERPL-CCNC: 166 U/L (ref 46–116)
ALT SERPL W P-5'-P-CCNC: 79 U/L (ref 12–78)
ANION GAP SERPL CALCULATED.3IONS-SCNC: 10 MMOL/L (ref 4–13)
AST SERPL W P-5'-P-CCNC: 20 U/L (ref 5–45)
BASOPHILS # BLD AUTO: 0.03 THOUSANDS/ΜL (ref 0–0.1)
BASOPHILS NFR BLD AUTO: 0 % (ref 0–1)
BILIRUB SERPL-MCNC: 1.7 MG/DL (ref 0.2–1)
BUN SERPL-MCNC: 15 MG/DL (ref 5–25)
CALCIUM SERPL-MCNC: 8.3 MG/DL (ref 8.3–10.1)
CHLORIDE SERPL-SCNC: 102 MMOL/L (ref 100–108)
CO2 SERPL-SCNC: 24 MMOL/L (ref 21–32)
CREAT SERPL-MCNC: 0.91 MG/DL (ref 0.6–1.3)
EOSINOPHIL # BLD AUTO: 0.05 THOUSAND/ΜL (ref 0–0.61)
EOSINOPHIL NFR BLD AUTO: 0 % (ref 0–6)
ERYTHROCYTE [DISTWIDTH] IN BLOOD BY AUTOMATED COUNT: 14.3 % (ref 11.6–15.1)
GFR SERPL CREATININE-BSD FRML MDRD: 92 ML/MIN/1.73SQ M
GLUCOSE SERPL-MCNC: 127 MG/DL (ref 65–140)
HCT VFR BLD AUTO: 45.1 % (ref 36.5–49.3)
HGB BLD-MCNC: 14.9 G/DL (ref 12–17)
IMM GRANULOCYTES # BLD AUTO: 0.18 THOUSAND/UL (ref 0–0.2)
IMM GRANULOCYTES NFR BLD AUTO: 1 % (ref 0–2)
LIPASE SERPL-CCNC: 110 U/L (ref 73–393)
LYMPHOCYTES # BLD AUTO: 0.97 THOUSANDS/ΜL (ref 0.6–4.47)
LYMPHOCYTES NFR BLD AUTO: 6 % (ref 14–44)
MAGNESIUM SERPL-MCNC: 2 MG/DL (ref 1.6–2.6)
MCH RBC QN AUTO: 29.7 PG (ref 26.8–34.3)
MCHC RBC AUTO-ENTMCNC: 33 G/DL (ref 31.4–37.4)
MCV RBC AUTO: 90 FL (ref 82–98)
MONOCYTES # BLD AUTO: 1.53 THOUSAND/ΜL (ref 0.17–1.22)
MONOCYTES NFR BLD AUTO: 9 % (ref 4–12)
NEUTROPHILS # BLD AUTO: 13.51 THOUSANDS/ΜL (ref 1.85–7.62)
NEUTS SEG NFR BLD AUTO: 84 % (ref 43–75)
NRBC BLD AUTO-RTO: 0 /100 WBCS
PHOSPHATE SERPL-MCNC: 2.5 MG/DL (ref 2.7–4.5)
PLATELET # BLD AUTO: 173 THOUSANDS/UL (ref 149–390)
PMV BLD AUTO: 9.8 FL (ref 8.9–12.7)
POTASSIUM SERPL-SCNC: 3.2 MMOL/L (ref 3.5–5.3)
PROT SERPL-MCNC: 5.6 G/DL (ref 6.4–8.2)
RBC # BLD AUTO: 5.02 MILLION/UL (ref 3.88–5.62)
SODIUM SERPL-SCNC: 136 MMOL/L (ref 136–145)
WBC # BLD AUTO: 16.27 THOUSAND/UL (ref 4.31–10.16)

## 2019-04-15 PROCEDURE — 74330 X-RAY BILE/PANC ENDOSCOPY: CPT

## 2019-04-15 PROCEDURE — 43262 ENDO CHOLANGIOPANCREATOGRAPH: CPT | Performed by: INTERNAL MEDICINE

## 2019-04-15 PROCEDURE — 88305 TISSUE EXAM BY PATHOLOGIST: CPT | Performed by: PATHOLOGY

## 2019-04-15 PROCEDURE — 74018 RADEX ABDOMEN 1 VIEW: CPT

## 2019-04-15 PROCEDURE — 99232 SBSQ HOSP IP/OBS MODERATE 35: CPT | Performed by: STUDENT IN AN ORGANIZED HEALTH CARE EDUCATION/TRAINING PROGRAM

## 2019-04-15 PROCEDURE — 47562 LAPAROSCOPIC CHOLECYSTECTOMY: CPT | Performed by: PHYSICIAN ASSISTANT

## 2019-04-15 PROCEDURE — 99232 SBSQ HOSP IP/OBS MODERATE 35: CPT | Performed by: SPECIALIST

## 2019-04-15 PROCEDURE — 84100 ASSAY OF PHOSPHORUS: CPT | Performed by: STUDENT IN AN ORGANIZED HEALTH CARE EDUCATION/TRAINING PROGRAM

## 2019-04-15 PROCEDURE — 88304 TISSUE EXAM BY PATHOLOGIST: CPT | Performed by: PATHOLOGY

## 2019-04-15 PROCEDURE — 0FT44ZZ RESECTION OF GALLBLADDER, PERCUTANEOUS ENDOSCOPIC APPROACH: ICD-10-PCS | Performed by: SURGERY

## 2019-04-15 PROCEDURE — 0DB48ZX EXCISION OF ESOPHAGOGASTRIC JUNCTION, VIA NATURAL OR ARTIFICIAL OPENING ENDOSCOPIC, DIAGNOSTIC: ICD-10-PCS | Performed by: INTERNAL MEDICINE

## 2019-04-15 PROCEDURE — 0FC98ZZ EXTIRPATION OF MATTER FROM COMMON BILE DUCT, VIA NATURAL OR ARTIFICIAL OPENING ENDOSCOPIC: ICD-10-PCS | Performed by: INTERNAL MEDICINE

## 2019-04-15 PROCEDURE — 80053 COMPREHEN METABOLIC PANEL: CPT | Performed by: STUDENT IN AN ORGANIZED HEALTH CARE EDUCATION/TRAINING PROGRAM

## 2019-04-15 PROCEDURE — 99232 SBSQ HOSP IP/OBS MODERATE 35: CPT | Performed by: FAMILY MEDICINE

## 2019-04-15 PROCEDURE — 83735 ASSAY OF MAGNESIUM: CPT | Performed by: STUDENT IN AN ORGANIZED HEALTH CARE EDUCATION/TRAINING PROGRAM

## 2019-04-15 PROCEDURE — 43264 ERCP REMOVE DUCT CALCULI: CPT | Performed by: INTERNAL MEDICINE

## 2019-04-15 PROCEDURE — 43239 EGD BIOPSY SINGLE/MULTIPLE: CPT | Performed by: INTERNAL MEDICINE

## 2019-04-15 PROCEDURE — C1769 GUIDE WIRE: HCPCS | Performed by: INTERNAL MEDICINE

## 2019-04-15 PROCEDURE — 83690 ASSAY OF LIPASE: CPT | Performed by: STUDENT IN AN ORGANIZED HEALTH CARE EDUCATION/TRAINING PROGRAM

## 2019-04-15 PROCEDURE — 47562 LAPAROSCOPIC CHOLECYSTECTOMY: CPT | Performed by: SURGERY

## 2019-04-15 PROCEDURE — 43273 ENDOSCOPIC PANCREATOSCOPY: CPT | Performed by: INTERNAL MEDICINE

## 2019-04-15 PROCEDURE — 85025 COMPLETE CBC W/AUTO DIFF WBC: CPT | Performed by: STUDENT IN AN ORGANIZED HEALTH CARE EDUCATION/TRAINING PROGRAM

## 2019-04-15 RX ORDER — MIDAZOLAM HYDROCHLORIDE 1 MG/ML
INJECTION INTRAMUSCULAR; INTRAVENOUS AS NEEDED
Status: DISCONTINUED | OUTPATIENT
Start: 2019-04-15 | End: 2019-04-15 | Stop reason: SURG

## 2019-04-15 RX ORDER — SODIUM CHLORIDE 9 MG/ML
75 INJECTION, SOLUTION INTRAVENOUS CONTINUOUS
Status: DISCONTINUED | OUTPATIENT
Start: 2019-04-15 | End: 2019-04-15

## 2019-04-15 RX ORDER — HYDROMORPHONE HCL/PF 1 MG/ML
0.5 SYRINGE (ML) INJECTION
Status: DISCONTINUED | OUTPATIENT
Start: 2019-04-15 | End: 2019-04-15 | Stop reason: HOSPADM

## 2019-04-15 RX ORDER — FENTANYL CITRATE/PF 50 MCG/ML
50 SYRINGE (ML) INJECTION
Status: DISCONTINUED | OUTPATIENT
Start: 2019-04-15 | End: 2019-04-15 | Stop reason: HOSPADM

## 2019-04-15 RX ORDER — ACETAMINOPHEN 325 MG/1
650 TABLET ORAL EVERY 6 HOURS PRN
Status: DISCONTINUED | OUTPATIENT
Start: 2019-04-15 | End: 2019-04-18 | Stop reason: HOSPADM

## 2019-04-15 RX ORDER — PROPOFOL 10 MG/ML
INJECTION, EMULSION INTRAVENOUS AS NEEDED
Status: DISCONTINUED | OUTPATIENT
Start: 2019-04-15 | End: 2019-04-15 | Stop reason: SURG

## 2019-04-15 RX ORDER — GLYCOPYRROLATE 0.2 MG/ML
INJECTION INTRAMUSCULAR; INTRAVENOUS AS NEEDED
Status: DISCONTINUED | OUTPATIENT
Start: 2019-04-15 | End: 2019-04-15 | Stop reason: SURG

## 2019-04-15 RX ORDER — PROMETHAZINE HYDROCHLORIDE 25 MG/ML
12.5 INJECTION, SOLUTION INTRAMUSCULAR; INTRAVENOUS ONCE AS NEEDED
Status: DISCONTINUED | OUTPATIENT
Start: 2019-04-15 | End: 2019-04-15 | Stop reason: HOSPADM

## 2019-04-15 RX ORDER — HYDROMORPHONE HCL/PF 1 MG/ML
0.5 SYRINGE (ML) INJECTION
Status: DISCONTINUED | OUTPATIENT
Start: 2019-04-15 | End: 2019-04-18 | Stop reason: HOSPADM

## 2019-04-15 RX ORDER — FENTANYL CITRATE 50 UG/ML
INJECTION, SOLUTION INTRAMUSCULAR; INTRAVENOUS AS NEEDED
Status: DISCONTINUED | OUTPATIENT
Start: 2019-04-15 | End: 2019-04-15 | Stop reason: SURG

## 2019-04-15 RX ORDER — OXYCODONE HYDROCHLORIDE 10 MG/1
10 TABLET ORAL EVERY 4 HOURS PRN
Status: DISCONTINUED | OUTPATIENT
Start: 2019-04-15 | End: 2019-04-17

## 2019-04-15 RX ORDER — OXYCODONE HYDROCHLORIDE 5 MG/1
5 TABLET ORAL EVERY 4 HOURS PRN
Status: DISCONTINUED | OUTPATIENT
Start: 2019-04-15 | End: 2019-04-17

## 2019-04-15 RX ORDER — DEXAMETHASONE SODIUM PHOSPHATE 10 MG/ML
INJECTION, SOLUTION INTRAMUSCULAR; INTRAVENOUS AS NEEDED
Status: DISCONTINUED | OUTPATIENT
Start: 2019-04-15 | End: 2019-04-15 | Stop reason: SURG

## 2019-04-15 RX ORDER — POTASSIUM CHLORIDE 14.9 MG/ML
20 INJECTION INTRAVENOUS ONCE
Status: COMPLETED | OUTPATIENT
Start: 2019-04-15 | End: 2019-04-15

## 2019-04-15 RX ORDER — ONDANSETRON 2 MG/ML
4 INJECTION INTRAMUSCULAR; INTRAVENOUS ONCE AS NEEDED
Status: DISCONTINUED | OUTPATIENT
Start: 2019-04-15 | End: 2019-04-15 | Stop reason: HOSPADM

## 2019-04-15 RX ORDER — MAGNESIUM HYDROXIDE 1200 MG/15ML
LIQUID ORAL AS NEEDED
Status: DISCONTINUED | OUTPATIENT
Start: 2019-04-15 | End: 2019-04-15 | Stop reason: HOSPADM

## 2019-04-15 RX ORDER — ROCURONIUM BROMIDE 10 MG/ML
INJECTION, SOLUTION INTRAVENOUS AS NEEDED
Status: DISCONTINUED | OUTPATIENT
Start: 2019-04-15 | End: 2019-04-15 | Stop reason: SURG

## 2019-04-15 RX ORDER — SUCCINYLCHOLINE/SOD CL,ISO/PF 100 MG/5ML
SYRINGE (ML) INTRAVENOUS AS NEEDED
Status: DISCONTINUED | OUTPATIENT
Start: 2019-04-15 | End: 2019-04-15 | Stop reason: SURG

## 2019-04-15 RX ORDER — NEOSTIGMINE METHYLSULFATE 1 MG/ML
INJECTION INTRAVENOUS AS NEEDED
Status: DISCONTINUED | OUTPATIENT
Start: 2019-04-15 | End: 2019-04-15 | Stop reason: SURG

## 2019-04-15 RX ORDER — CEFAZOLIN SODIUM 1 G/3ML
INJECTION, POWDER, FOR SOLUTION INTRAMUSCULAR; INTRAVENOUS AS NEEDED
Status: DISCONTINUED | OUTPATIENT
Start: 2019-04-15 | End: 2019-04-15 | Stop reason: SURG

## 2019-04-15 RX ORDER — MEPERIDINE HYDROCHLORIDE 25 MG/ML
12.5 INJECTION INTRAMUSCULAR; INTRAVENOUS; SUBCUTANEOUS
Status: DISCONTINUED | OUTPATIENT
Start: 2019-04-15 | End: 2019-04-15 | Stop reason: HOSPADM

## 2019-04-15 RX ORDER — POTASSIUM CHLORIDE 14.9 MG/ML
20 INJECTION INTRAVENOUS
Status: DISCONTINUED | OUTPATIENT
Start: 2019-04-15 | End: 2019-04-15

## 2019-04-15 RX ORDER — ONDANSETRON 2 MG/ML
INJECTION INTRAMUSCULAR; INTRAVENOUS AS NEEDED
Status: DISCONTINUED | OUTPATIENT
Start: 2019-04-15 | End: 2019-04-15 | Stop reason: SURG

## 2019-04-15 RX ADMIN — FENTANYL CITRATE 100 MCG: 50 INJECTION, SOLUTION INTRAMUSCULAR; INTRAVENOUS at 17:12

## 2019-04-15 RX ADMIN — FENTANYL CITRATE 50 MCG: 50 INJECTION, SOLUTION INTRAMUSCULAR; INTRAVENOUS at 19:05

## 2019-04-15 RX ADMIN — METRONIDAZOLE 500 MG: 500 INJECTION, SOLUTION INTRAVENOUS at 15:38

## 2019-04-15 RX ADMIN — POTASSIUM CHLORIDE 20 MEQ: 200 INJECTION, SOLUTION INTRAVENOUS at 13:35

## 2019-04-15 RX ADMIN — ONDANSETRON 4 MG: 2 INJECTION INTRAMUSCULAR; INTRAVENOUS at 16:50

## 2019-04-15 RX ADMIN — DEXTROSE, SODIUM CHLORIDE, SODIUM LACTATE, POTASSIUM CHLORIDE, AND CALCIUM CHLORIDE 150 ML/HR: 5; .6; .31; .03; .02 INJECTION, SOLUTION INTRAVENOUS at 13:33

## 2019-04-15 RX ADMIN — DEXTROSE, SODIUM CHLORIDE, SODIUM LACTATE, POTASSIUM CHLORIDE, AND CALCIUM CHLORIDE 150 ML/HR: 5; .6; .31; .03; .02 INJECTION, SOLUTION INTRAVENOUS at 03:56

## 2019-04-15 RX ADMIN — SODIUM CHLORIDE 75 ML/HR: 0.9 INJECTION, SOLUTION INTRAVENOUS at 16:25

## 2019-04-15 RX ADMIN — DEXAMETHASONE SODIUM PHOSPHATE 8 MG: 10 INJECTION, SOLUTION INTRAMUSCULAR; INTRAVENOUS at 17:11

## 2019-04-15 RX ADMIN — MELATONIN TAB 3 MG 3 MG: 3 TAB at 22:13

## 2019-04-15 RX ADMIN — FENTANYL CITRATE 50 MCG: 50 INJECTION, SOLUTION INTRAMUSCULAR; INTRAVENOUS at 18:57

## 2019-04-15 RX ADMIN — POTASSIUM CHLORIDE 20 MEQ: 200 INJECTION, SOLUTION INTRAVENOUS at 11:05

## 2019-04-15 RX ADMIN — FENTANYL CITRATE 50 MCG: 50 INJECTION, SOLUTION INTRAMUSCULAR; INTRAVENOUS at 20:16

## 2019-04-15 RX ADMIN — CIPROFLOXACIN 400 MG: 2 INJECTION, SOLUTION INTRAVENOUS at 13:39

## 2019-04-15 RX ADMIN — CIPROFLOXACIN 400 MG: 2 INJECTION, SOLUTION INTRAVENOUS at 01:43

## 2019-04-15 RX ADMIN — MIDAZOLAM 2 MG: 1 INJECTION INTRAMUSCULAR; INTRAVENOUS at 16:50

## 2019-04-15 RX ADMIN — SODIUM CHLORIDE 1000 ML: 0.9 INJECTION, SOLUTION INTRAVENOUS at 16:16

## 2019-04-15 RX ADMIN — POTASSIUM CHLORIDE 20 MEQ: 200 INJECTION, SOLUTION INTRAVENOUS at 09:12

## 2019-04-15 RX ADMIN — FENTANYL CITRATE 50 MCG: 50 INJECTION, SOLUTION INTRAMUSCULAR; INTRAVENOUS at 20:23

## 2019-04-15 RX ADMIN — ROCURONIUM BROMIDE 50 MG: 10 INJECTION INTRAVENOUS at 17:15

## 2019-04-15 RX ADMIN — Medication 100 MG: at 16:55

## 2019-04-15 RX ADMIN — MENTHOL, METHYL SALICYLATE 1 APPLICATION: 10; 15 CREAM TOPICAL at 01:44

## 2019-04-15 RX ADMIN — GLYCOPYRROLATE 1 MG: 0.2 INJECTION, SOLUTION INTRAMUSCULAR; INTRAVENOUS at 19:44

## 2019-04-15 RX ADMIN — METRONIDAZOLE 500 MG: 500 INJECTION, SOLUTION INTRAVENOUS at 06:20

## 2019-04-15 RX ADMIN — Medication 2000 MG: at 18:01

## 2019-04-15 RX ADMIN — PROPOFOL 200 MG: 10 INJECTION, EMULSION INTRAVENOUS at 16:55

## 2019-04-15 RX ADMIN — NEOSTIGMINE METHYLSULFATE 5 MG: 1 INJECTION INTRAVENOUS at 19:44

## 2019-04-15 RX ADMIN — SODIUM CHLORIDE: 0.9 INJECTION, SOLUTION INTRAVENOUS at 16:43

## 2019-04-15 RX ADMIN — LIDOCAINE HYDROCHLORIDE 200 MG: 20 INJECTION, SOLUTION INTRAVENOUS at 17:10

## 2019-04-16 LAB
ALBUMIN SERPL BCP-MCNC: 1.8 G/DL (ref 3.5–5)
ALP SERPL-CCNC: 130 U/L (ref 46–116)
ALT SERPL W P-5'-P-CCNC: 76 U/L (ref 12–78)
ANION GAP SERPL CALCULATED.3IONS-SCNC: 6 MMOL/L (ref 4–13)
AST SERPL W P-5'-P-CCNC: 52 U/L (ref 5–45)
BASOPHILS # BLD MANUAL: 0 THOUSAND/UL (ref 0–0.1)
BASOPHILS NFR MAR MANUAL: 0 % (ref 0–1)
BILIRUB SERPL-MCNC: 1.1 MG/DL (ref 0.2–1)
BUN SERPL-MCNC: 17 MG/DL (ref 5–25)
CALCIUM SERPL-MCNC: 8.3 MG/DL (ref 8.3–10.1)
CHLORIDE SERPL-SCNC: 104 MMOL/L (ref 100–108)
CO2 SERPL-SCNC: 27 MMOL/L (ref 21–32)
CREAT SERPL-MCNC: 1.13 MG/DL (ref 0.6–1.3)
EOSINOPHIL # BLD MANUAL: 0 THOUSAND/UL (ref 0–0.4)
EOSINOPHIL NFR BLD MANUAL: 0 % (ref 0–6)
ERYTHROCYTE [DISTWIDTH] IN BLOOD BY AUTOMATED COUNT: 14.4 % (ref 11.6–15.1)
GFR SERPL CREATININE-BSD FRML MDRD: 71 ML/MIN/1.73SQ M
GLUCOSE SERPL-MCNC: 118 MG/DL (ref 65–140)
HCT VFR BLD AUTO: 40.7 % (ref 36.5–49.3)
HGB BLD-MCNC: 13.3 G/DL (ref 12–17)
LIPASE SERPL-CCNC: 82 U/L (ref 73–393)
LYMPHOCYTES # BLD AUTO: 1.43 THOUSAND/UL (ref 0.6–4.47)
LYMPHOCYTES # BLD AUTO: 9 % (ref 14–44)
MAGNESIUM SERPL-MCNC: 2 MG/DL (ref 1.6–2.6)
MCH RBC QN AUTO: 29.5 PG (ref 26.8–34.3)
MCHC RBC AUTO-ENTMCNC: 32.7 G/DL (ref 31.4–37.4)
MCV RBC AUTO: 90 FL (ref 82–98)
METAMYELOCYTES NFR BLD MANUAL: 1 % (ref 0–1)
MONOCYTES # BLD AUTO: 1.43 THOUSAND/UL (ref 0–1.22)
MONOCYTES NFR BLD: 9 % (ref 4–12)
NEUTROPHILS # BLD MANUAL: 12.84 THOUSAND/UL (ref 1.85–7.62)
NEUTS BAND NFR BLD MANUAL: 10 % (ref 0–8)
NEUTS SEG NFR BLD AUTO: 71 % (ref 43–75)
NRBC BLD AUTO-RTO: 0 /100 WBCS
PHOSPHATE SERPL-MCNC: 4.6 MG/DL (ref 2.7–4.5)
PLATELET # BLD AUTO: 190 THOUSANDS/UL (ref 149–390)
PLATELET BLD QL SMEAR: ADEQUATE
PMV BLD AUTO: 10 FL (ref 8.9–12.7)
POTASSIUM SERPL-SCNC: 3.9 MMOL/L (ref 3.5–5.3)
PROT SERPL-MCNC: 5.2 G/DL (ref 6.4–8.2)
RBC # BLD AUTO: 4.51 MILLION/UL (ref 3.88–5.62)
RBC MORPH BLD: NORMAL
SODIUM SERPL-SCNC: 137 MMOL/L (ref 136–145)
TOTAL CELLS COUNTED SPEC: 100
TOXIC GRANULES BLD QL SMEAR: PRESENT
WBC # BLD AUTO: 15.85 THOUSAND/UL (ref 4.31–10.16)

## 2019-04-16 PROCEDURE — 80053 COMPREHEN METABOLIC PANEL: CPT | Performed by: PHYSICIAN ASSISTANT

## 2019-04-16 PROCEDURE — 99232 SBSQ HOSP IP/OBS MODERATE 35: CPT | Performed by: FAMILY MEDICINE

## 2019-04-16 PROCEDURE — 85007 BL SMEAR W/DIFF WBC COUNT: CPT | Performed by: PHYSICIAN ASSISTANT

## 2019-04-16 PROCEDURE — 99024 POSTOP FOLLOW-UP VISIT: CPT | Performed by: SPECIALIST

## 2019-04-16 PROCEDURE — 83690 ASSAY OF LIPASE: CPT | Performed by: STUDENT IN AN ORGANIZED HEALTH CARE EDUCATION/TRAINING PROGRAM

## 2019-04-16 PROCEDURE — 99232 SBSQ HOSP IP/OBS MODERATE 35: CPT | Performed by: INTERNAL MEDICINE

## 2019-04-16 PROCEDURE — 83735 ASSAY OF MAGNESIUM: CPT | Performed by: PHYSICIAN ASSISTANT

## 2019-04-16 PROCEDURE — 85027 COMPLETE CBC AUTOMATED: CPT | Performed by: PHYSICIAN ASSISTANT

## 2019-04-16 PROCEDURE — 84100 ASSAY OF PHOSPHORUS: CPT | Performed by: PHYSICIAN ASSISTANT

## 2019-04-16 RX ORDER — CALCIUM CARBONATE 200(500)MG
500 TABLET,CHEWABLE ORAL DAILY PRN
Status: DISCONTINUED | OUTPATIENT
Start: 2019-04-16 | End: 2019-04-18 | Stop reason: HOSPADM

## 2019-04-16 RX ORDER — DOCUSATE SODIUM 100 MG/1
100 CAPSULE, LIQUID FILLED ORAL 2 TIMES DAILY PRN
Status: DISCONTINUED | OUTPATIENT
Start: 2019-04-16 | End: 2019-04-18 | Stop reason: HOSPADM

## 2019-04-16 RX ADMIN — OXYCODONE HYDROCHLORIDE 5 MG: 5 TABLET ORAL at 05:11

## 2019-04-16 RX ADMIN — Medication 500 MG: at 09:44

## 2019-04-16 RX ADMIN — OXYCODONE HYDROCHLORIDE 10 MG: 10 TABLET ORAL at 13:03

## 2019-04-16 RX ADMIN — Medication 250 MG: at 17:03

## 2019-04-16 RX ADMIN — OXYCODONE HYDROCHLORIDE 5 MG: 5 TABLET ORAL at 15:19

## 2019-04-16 RX ADMIN — Medication 500 MG: at 04:39

## 2019-04-16 RX ADMIN — PANTOPRAZOLE SODIUM 40 MG: 40 TABLET, DELAYED RELEASE ORAL at 15:20

## 2019-04-16 RX ADMIN — Medication 250 MG: at 09:39

## 2019-04-16 RX ADMIN — OXYCODONE HYDROCHLORIDE 5 MG: 5 TABLET ORAL at 09:39

## 2019-04-16 RX ADMIN — PANTOPRAZOLE SODIUM 40 MG: 40 TABLET, DELAYED RELEASE ORAL at 06:02

## 2019-04-16 RX ADMIN — MELATONIN TAB 3 MG 3 MG: 3 TAB at 22:01

## 2019-04-17 LAB
ALBUMIN SERPL BCP-MCNC: 1.9 G/DL (ref 3.5–5)
ALP SERPL-CCNC: 118 U/L (ref 46–116)
ALT SERPL W P-5'-P-CCNC: 62 U/L (ref 12–78)
ANION GAP SERPL CALCULATED.3IONS-SCNC: 8 MMOL/L (ref 4–13)
AST SERPL W P-5'-P-CCNC: 34 U/L (ref 5–45)
BASOPHILS # BLD AUTO: 0.07 THOUSANDS/ΜL (ref 0–0.1)
BASOPHILS NFR BLD AUTO: 0 % (ref 0–1)
BILIRUB SERPL-MCNC: 1.1 MG/DL (ref 0.2–1)
BUN SERPL-MCNC: 17 MG/DL (ref 5–25)
CALCIUM SERPL-MCNC: 8.3 MG/DL (ref 8.3–10.1)
CHLORIDE SERPL-SCNC: 102 MMOL/L (ref 100–108)
CO2 SERPL-SCNC: 26 MMOL/L (ref 21–32)
CREAT SERPL-MCNC: 1.11 MG/DL (ref 0.6–1.3)
EOSINOPHIL # BLD AUTO: 0.08 THOUSAND/ΜL (ref 0–0.61)
EOSINOPHIL NFR BLD AUTO: 1 % (ref 0–6)
ERYTHROCYTE [DISTWIDTH] IN BLOOD BY AUTOMATED COUNT: 14.3 % (ref 11.6–15.1)
GFR SERPL CREATININE-BSD FRML MDRD: 72 ML/MIN/1.73SQ M
GLUCOSE SERPL-MCNC: 110 MG/DL (ref 65–140)
HCT VFR BLD AUTO: 43.6 % (ref 36.5–49.3)
HGB BLD-MCNC: 13.8 G/DL (ref 12–17)
IMM GRANULOCYTES # BLD AUTO: >0.5 THOUSAND/UL (ref 0–0.2)
IMM GRANULOCYTES NFR BLD AUTO: 3 % (ref 0–2)
LYMPHOCYTES # BLD AUTO: 1.68 THOUSANDS/ΜL (ref 0.6–4.47)
LYMPHOCYTES NFR BLD AUTO: 10 % (ref 14–44)
MAGNESIUM SERPL-MCNC: 2.1 MG/DL (ref 1.6–2.6)
MCH RBC QN AUTO: 29.7 PG (ref 26.8–34.3)
MCHC RBC AUTO-ENTMCNC: 31.7 G/DL (ref 31.4–37.4)
MCV RBC AUTO: 94 FL (ref 82–98)
MONOCYTES # BLD AUTO: 1.67 THOUSAND/ΜL (ref 0.17–1.22)
MONOCYTES NFR BLD AUTO: 10 % (ref 4–12)
NEUTROPHILS # BLD AUTO: 13.48 THOUSANDS/ΜL (ref 1.85–7.62)
NEUTS SEG NFR BLD AUTO: 76 % (ref 43–75)
NRBC BLD AUTO-RTO: 0 /100 WBCS
PHOSPHATE SERPL-MCNC: 4 MG/DL (ref 2.7–4.5)
PLATELET # BLD AUTO: 195 THOUSANDS/UL (ref 149–390)
PMV BLD AUTO: 9.5 FL (ref 8.9–12.7)
POTASSIUM SERPL-SCNC: 3.6 MMOL/L (ref 3.5–5.3)
PROT SERPL-MCNC: 5.4 G/DL (ref 6.4–8.2)
RBC # BLD AUTO: 4.65 MILLION/UL (ref 3.88–5.62)
SODIUM SERPL-SCNC: 136 MMOL/L (ref 136–145)
WBC # BLD AUTO: 17.58 THOUSAND/UL (ref 4.31–10.16)

## 2019-04-17 PROCEDURE — 85025 COMPLETE CBC W/AUTO DIFF WBC: CPT | Performed by: STUDENT IN AN ORGANIZED HEALTH CARE EDUCATION/TRAINING PROGRAM

## 2019-04-17 PROCEDURE — 83735 ASSAY OF MAGNESIUM: CPT | Performed by: STUDENT IN AN ORGANIZED HEALTH CARE EDUCATION/TRAINING PROGRAM

## 2019-04-17 PROCEDURE — 84100 ASSAY OF PHOSPHORUS: CPT | Performed by: STUDENT IN AN ORGANIZED HEALTH CARE EDUCATION/TRAINING PROGRAM

## 2019-04-17 PROCEDURE — 80053 COMPREHEN METABOLIC PANEL: CPT | Performed by: STUDENT IN AN ORGANIZED HEALTH CARE EDUCATION/TRAINING PROGRAM

## 2019-04-17 PROCEDURE — 99024 POSTOP FOLLOW-UP VISIT: CPT | Performed by: SURGERY

## 2019-04-17 RX ORDER — OXYCODONE HYDROCHLORIDE 5 MG/1
5 TABLET ORAL EVERY 4 HOURS PRN
Status: DISCONTINUED | OUTPATIENT
Start: 2019-04-17 | End: 2019-04-17

## 2019-04-17 RX ORDER — OXYCODONE HYDROCHLORIDE AND ACETAMINOPHEN 5; 325 MG/1; MG/1
1 TABLET ORAL EVERY 4 HOURS PRN
Status: DISCONTINUED | OUTPATIENT
Start: 2019-04-17 | End: 2019-04-18 | Stop reason: HOSPADM

## 2019-04-17 RX ORDER — OXYCODONE HYDROCHLORIDE 5 MG/1
5 TABLET ORAL EVERY 4 HOURS PRN
Status: DISCONTINUED | OUTPATIENT
Start: 2019-04-17 | End: 2019-04-18 | Stop reason: HOSPADM

## 2019-04-17 RX ADMIN — ACETAMINOPHEN 650 MG: 325 TABLET, FILM COATED ORAL at 12:23

## 2019-04-17 RX ADMIN — PANTOPRAZOLE SODIUM 40 MG: 40 TABLET, DELAYED RELEASE ORAL at 16:08

## 2019-04-17 RX ADMIN — ACETAMINOPHEN 650 MG: 325 TABLET, FILM COATED ORAL at 06:13

## 2019-04-17 RX ADMIN — ACETAMINOPHEN 650 MG: 325 TABLET, FILM COATED ORAL at 00:08

## 2019-04-17 RX ADMIN — PANTOPRAZOLE SODIUM 40 MG: 40 TABLET, DELAYED RELEASE ORAL at 06:10

## 2019-04-17 RX ADMIN — Medication 250 MG: at 09:41

## 2019-04-17 RX ADMIN — Medication 250 MG: at 18:23

## 2019-04-17 RX ADMIN — MELATONIN TAB 3 MG 3 MG: 3 TAB at 22:07

## 2019-04-17 RX ADMIN — DOCUSATE SODIUM 100 MG: 100 CAPSULE, LIQUID FILLED ORAL at 22:07

## 2019-04-17 RX ADMIN — ACETAMINOPHEN 650 MG: 325 TABLET, FILM COATED ORAL at 18:23

## 2019-04-18 VITALS
TEMPERATURE: 98.06 F | WEIGHT: 224.43 LBS | HEIGHT: 74 IN | RESPIRATION RATE: 18 BRPM | BODY MASS INDEX: 28.8 KG/M2 | DIASTOLIC BLOOD PRESSURE: 86 MMHG | HEART RATE: 76 BPM | OXYGEN SATURATION: 98 % | SYSTOLIC BLOOD PRESSURE: 154 MMHG

## 2019-04-18 LAB
ALBUMIN SERPL BCP-MCNC: 1.8 G/DL (ref 3.5–5)
ALP SERPL-CCNC: 105 U/L (ref 46–116)
ALT SERPL W P-5'-P-CCNC: 45 U/L (ref 12–78)
ANION GAP SERPL CALCULATED.3IONS-SCNC: 8 MMOL/L (ref 4–13)
AST SERPL W P-5'-P-CCNC: 23 U/L (ref 5–45)
BACTERIA BLD CULT: NORMAL
BASOPHILS # BLD AUTO: 0.03 THOUSANDS/ΜL (ref 0–0.1)
BASOPHILS NFR BLD AUTO: 0 % (ref 0–1)
BILIRUB SERPL-MCNC: 1 MG/DL (ref 0.2–1)
BUN SERPL-MCNC: 11 MG/DL (ref 5–25)
CALCIUM SERPL-MCNC: 8.3 MG/DL (ref 8.3–10.1)
CHLORIDE SERPL-SCNC: 102 MMOL/L (ref 100–108)
CO2 SERPL-SCNC: 27 MMOL/L (ref 21–32)
CREAT SERPL-MCNC: 1.04 MG/DL (ref 0.6–1.3)
EOSINOPHIL # BLD AUTO: 0.1 THOUSAND/ΜL (ref 0–0.61)
EOSINOPHIL NFR BLD AUTO: 1 % (ref 0–6)
ERYTHROCYTE [DISTWIDTH] IN BLOOD BY AUTOMATED COUNT: 14 % (ref 11.6–15.1)
GFR SERPL CREATININE-BSD FRML MDRD: 78 ML/MIN/1.73SQ M
GLUCOSE SERPL-MCNC: 105 MG/DL (ref 65–140)
HCT VFR BLD AUTO: 40.4 % (ref 36.5–49.3)
HGB BLD-MCNC: 13.1 G/DL (ref 12–17)
IMM GRANULOCYTES # BLD AUTO: 0.44 THOUSAND/UL (ref 0–0.2)
IMM GRANULOCYTES NFR BLD AUTO: 3 % (ref 0–2)
LYMPHOCYTES # BLD AUTO: 1.19 THOUSANDS/ΜL (ref 0.6–4.47)
LYMPHOCYTES NFR BLD AUTO: 8 % (ref 14–44)
MAGNESIUM SERPL-MCNC: 2.1 MG/DL (ref 1.6–2.6)
MCH RBC QN AUTO: 29.2 PG (ref 26.8–34.3)
MCHC RBC AUTO-ENTMCNC: 32.4 G/DL (ref 31.4–37.4)
MCV RBC AUTO: 90 FL (ref 82–98)
MONOCYTES # BLD AUTO: 1.02 THOUSAND/ΜL (ref 0.17–1.22)
MONOCYTES NFR BLD AUTO: 7 % (ref 4–12)
NEUTROPHILS # BLD AUTO: 12.39 THOUSANDS/ΜL (ref 1.85–7.62)
NEUTS SEG NFR BLD AUTO: 81 % (ref 43–75)
NRBC BLD AUTO-RTO: 0 /100 WBCS
PHOSPHATE SERPL-MCNC: 3.6 MG/DL (ref 2.7–4.5)
PLATELET # BLD AUTO: 193 THOUSANDS/UL (ref 149–390)
PMV BLD AUTO: 9.5 FL (ref 8.9–12.7)
POTASSIUM SERPL-SCNC: 3.3 MMOL/L (ref 3.5–5.3)
PROT SERPL-MCNC: 5.4 G/DL (ref 6.4–8.2)
RBC # BLD AUTO: 4.49 MILLION/UL (ref 3.88–5.62)
SODIUM SERPL-SCNC: 137 MMOL/L (ref 136–145)
WBC # BLD AUTO: 15.17 THOUSAND/UL (ref 4.31–10.16)

## 2019-04-18 PROCEDURE — 99024 POSTOP FOLLOW-UP VISIT: CPT | Performed by: SPECIALIST

## 2019-04-18 PROCEDURE — 84100 ASSAY OF PHOSPHORUS: CPT | Performed by: STUDENT IN AN ORGANIZED HEALTH CARE EDUCATION/TRAINING PROGRAM

## 2019-04-18 PROCEDURE — 85025 COMPLETE CBC W/AUTO DIFF WBC: CPT | Performed by: STUDENT IN AN ORGANIZED HEALTH CARE EDUCATION/TRAINING PROGRAM

## 2019-04-18 PROCEDURE — 83735 ASSAY OF MAGNESIUM: CPT | Performed by: STUDENT IN AN ORGANIZED HEALTH CARE EDUCATION/TRAINING PROGRAM

## 2019-04-18 PROCEDURE — NC001 PR NO CHARGE: Performed by: PHYSICIAN ASSISTANT

## 2019-04-18 PROCEDURE — 80053 COMPREHEN METABOLIC PANEL: CPT | Performed by: STUDENT IN AN ORGANIZED HEALTH CARE EDUCATION/TRAINING PROGRAM

## 2019-04-18 RX ORDER — OXYCODONE HYDROCHLORIDE 5 MG/1
5 TABLET ORAL EVERY 4 HOURS PRN
Qty: 10 TABLET | Refills: 0 | Status: SHIPPED | OUTPATIENT
Start: 2019-04-18 | End: 2019-06-11

## 2019-04-18 RX ORDER — PANTOPRAZOLE SODIUM 40 MG/1
40 TABLET, DELAYED RELEASE ORAL
Qty: 28 TABLET | Refills: 0 | Status: SHIPPED | OUTPATIENT
Start: 2019-04-18 | End: 2019-04-22 | Stop reason: SDUPTHER

## 2019-04-18 RX ORDER — POTASSIUM CHLORIDE 20 MEQ/1
40 TABLET, EXTENDED RELEASE ORAL 2 TIMES DAILY
Status: DISCONTINUED | OUTPATIENT
Start: 2019-04-18 | End: 2019-04-18 | Stop reason: HOSPADM

## 2019-04-18 RX ADMIN — PANTOPRAZOLE SODIUM 40 MG: 40 TABLET, DELAYED RELEASE ORAL at 16:54

## 2019-04-18 RX ADMIN — PANTOPRAZOLE SODIUM 40 MG: 40 TABLET, DELAYED RELEASE ORAL at 07:24

## 2019-04-18 RX ADMIN — ACETAMINOPHEN 650 MG: 325 TABLET, FILM COATED ORAL at 02:13

## 2019-04-18 RX ADMIN — POTASSIUM CHLORIDE 40 MEQ: 1500 TABLET, EXTENDED RELEASE ORAL at 11:40

## 2019-04-18 RX ADMIN — ACETAMINOPHEN 650 MG: 325 TABLET, FILM COATED ORAL at 14:31

## 2019-04-18 RX ADMIN — Medication 250 MG: at 08:41

## 2019-04-22 ENCOUNTER — TELEPHONE (OUTPATIENT)
Dept: GASTROENTEROLOGY | Facility: AMBULARY SURGERY CENTER | Age: 59
End: 2019-04-22

## 2019-04-22 ENCOUNTER — APPOINTMENT (OUTPATIENT)
Dept: LAB | Facility: HOSPITAL | Age: 59
End: 2019-04-22
Attending: SURGERY
Payer: COMMERCIAL

## 2019-04-22 ENCOUNTER — OFFICE VISIT (OUTPATIENT)
Dept: SURGERY | Facility: CLINIC | Age: 59
End: 2019-04-22

## 2019-04-22 ENCOUNTER — TRANSCRIBE ORDERS (OUTPATIENT)
Dept: ADMINISTRATIVE | Facility: HOSPITAL | Age: 59
End: 2019-04-22

## 2019-04-22 VITALS
SYSTOLIC BLOOD PRESSURE: 160 MMHG | HEIGHT: 74 IN | DIASTOLIC BLOOD PRESSURE: 90 MMHG | TEMPERATURE: 98.7 F | BODY MASS INDEX: 27.85 KG/M2 | WEIGHT: 217 LBS

## 2019-04-22 DIAGNOSIS — K85.90 PANCREATITIS: ICD-10-CM

## 2019-04-22 DIAGNOSIS — R19.7 DIARRHEA DUE TO MALABSORPTION: Primary | ICD-10-CM

## 2019-04-22 DIAGNOSIS — R19.7 DIARRHEA DUE TO MALABSORPTION: ICD-10-CM

## 2019-04-22 DIAGNOSIS — K85.10 GALLSTONE PANCREATITIS: ICD-10-CM

## 2019-04-22 DIAGNOSIS — K90.9 DIARRHEA DUE TO MALABSORPTION: ICD-10-CM

## 2019-04-22 DIAGNOSIS — K80.71 CALCULUS OF GALLBLADDER AND BILE DUCT WITH OBSTRUCTION WITHOUT CHOLECYSTITIS: ICD-10-CM

## 2019-04-22 DIAGNOSIS — K90.9 DIARRHEA DUE TO MALABSORPTION: Primary | ICD-10-CM

## 2019-04-22 LAB
ALBUMIN SERPL BCP-MCNC: 2.6 G/DL (ref 3.5–5)
ALP SERPL-CCNC: 120 U/L (ref 46–116)
ALT SERPL W P-5'-P-CCNC: 85 U/L (ref 12–78)
ANION GAP SERPL CALCULATED.3IONS-SCNC: 9 MMOL/L (ref 4–13)
AST SERPL W P-5'-P-CCNC: 65 U/L (ref 5–45)
BASOPHILS # BLD AUTO: 0.03 THOUSANDS/ΜL (ref 0–0.1)
BASOPHILS NFR BLD AUTO: 0 % (ref 0–1)
BILIRUB SERPL-MCNC: 0.9 MG/DL (ref 0.2–1)
BUN SERPL-MCNC: 10 MG/DL (ref 5–25)
CALCIUM SERPL-MCNC: 8.9 MG/DL (ref 8.3–10.1)
CHLORIDE SERPL-SCNC: 101 MMOL/L (ref 100–108)
CO2 SERPL-SCNC: 28 MMOL/L (ref 21–32)
CREAT SERPL-MCNC: 1.15 MG/DL (ref 0.6–1.3)
EOSINOPHIL # BLD AUTO: 0.18 THOUSAND/ΜL (ref 0–0.61)
EOSINOPHIL NFR BLD AUTO: 2 % (ref 0–6)
ERYTHROCYTE [DISTWIDTH] IN BLOOD BY AUTOMATED COUNT: 13.6 % (ref 11.6–15.1)
GFR SERPL CREATININE-BSD FRML MDRD: 69 ML/MIN/1.73SQ M
GLUCOSE SERPL-MCNC: 79 MG/DL (ref 65–140)
HCT VFR BLD AUTO: 42 % (ref 36.5–49.3)
HGB BLD-MCNC: 13.6 G/DL (ref 12–17)
IMM GRANULOCYTES # BLD AUTO: 0.22 THOUSAND/UL (ref 0–0.2)
IMM GRANULOCYTES NFR BLD AUTO: 2 % (ref 0–2)
LYMPHOCYTES # BLD AUTO: 1.59 THOUSANDS/ΜL (ref 0.6–4.47)
LYMPHOCYTES NFR BLD AUTO: 15 % (ref 14–44)
MCH RBC QN AUTO: 29.2 PG (ref 26.8–34.3)
MCHC RBC AUTO-ENTMCNC: 32.4 G/DL (ref 31.4–37.4)
MCV RBC AUTO: 90 FL (ref 82–98)
MONOCYTES # BLD AUTO: 0.74 THOUSAND/ΜL (ref 0.17–1.22)
MONOCYTES NFR BLD AUTO: 7 % (ref 4–12)
NEUTROPHILS # BLD AUTO: 8 THOUSANDS/ΜL (ref 1.85–7.62)
NEUTS SEG NFR BLD AUTO: 74 % (ref 43–75)
NRBC BLD AUTO-RTO: 0 /100 WBCS
PLATELET # BLD AUTO: 283 THOUSANDS/UL (ref 149–390)
PMV BLD AUTO: 8.7 FL (ref 8.9–12.7)
POTASSIUM SERPL-SCNC: 3.9 MMOL/L (ref 3.5–5.3)
PROT SERPL-MCNC: 6.7 G/DL (ref 6.4–8.2)
RBC # BLD AUTO: 4.65 MILLION/UL (ref 3.88–5.62)
SODIUM SERPL-SCNC: 138 MMOL/L (ref 136–145)
WBC # BLD AUTO: 10.76 THOUSAND/UL (ref 4.31–10.16)

## 2019-04-22 PROCEDURE — 85025 COMPLETE CBC W/AUTO DIFF WBC: CPT

## 2019-04-22 PROCEDURE — 99024 POSTOP FOLLOW-UP VISIT: CPT | Performed by: SURGERY

## 2019-04-22 PROCEDURE — 36415 COLL VENOUS BLD VENIPUNCTURE: CPT

## 2019-04-22 PROCEDURE — 87493 C DIFF AMPLIFIED PROBE: CPT

## 2019-04-22 PROCEDURE — 80053 COMPREHEN METABOLIC PANEL: CPT

## 2019-04-22 RX ORDER — PANTOPRAZOLE SODIUM 40 MG/1
40 TABLET, DELAYED RELEASE ORAL
Qty: 60 TABLET | Refills: 3 | Status: SHIPPED | OUTPATIENT
Start: 2019-04-22 | End: 2019-09-04 | Stop reason: SDUPTHER

## 2019-04-23 ENCOUNTER — TELEPHONE (OUTPATIENT)
Dept: GASTROENTEROLOGY | Facility: CLINIC | Age: 59
End: 2019-04-23

## 2019-04-23 LAB — C DIFF TOX GENS STL QL NAA+PROBE: NORMAL

## 2019-04-25 ENCOUNTER — OFFICE VISIT (OUTPATIENT)
Dept: SURGERY | Facility: CLINIC | Age: 59
End: 2019-04-25

## 2019-04-25 VITALS
DIASTOLIC BLOOD PRESSURE: 76 MMHG | TEMPERATURE: 98.6 F | WEIGHT: 209.2 LBS | SYSTOLIC BLOOD PRESSURE: 134 MMHG | BODY MASS INDEX: 26.85 KG/M2 | HEIGHT: 74 IN

## 2019-04-25 DIAGNOSIS — R19.7 DIARRHEA, UNSPECIFIED TYPE: Primary | ICD-10-CM

## 2019-04-25 PROCEDURE — 99024 POSTOP FOLLOW-UP VISIT: CPT | Performed by: SURGERY

## 2019-04-30 ENCOUNTER — OFFICE VISIT (OUTPATIENT)
Dept: SURGERY | Facility: CLINIC | Age: 59
End: 2019-04-30

## 2019-04-30 VITALS
WEIGHT: 211 LBS | HEART RATE: 90 BPM | TEMPERATURE: 98.4 F | SYSTOLIC BLOOD PRESSURE: 140 MMHG | HEIGHT: 74 IN | DIASTOLIC BLOOD PRESSURE: 88 MMHG | BODY MASS INDEX: 27.08 KG/M2

## 2019-04-30 DIAGNOSIS — K80.71 CALCULUS OF GALLBLADDER AND BILE DUCT WITH OBSTRUCTION WITHOUT CHOLECYSTITIS: ICD-10-CM

## 2019-04-30 DIAGNOSIS — K85.10 GALLSTONE PANCREATITIS: Primary | ICD-10-CM

## 2019-04-30 PROCEDURE — 99024 POSTOP FOLLOW-UP VISIT: CPT | Performed by: SURGERY

## 2019-06-13 ENCOUNTER — HOSPITAL ENCOUNTER (OUTPATIENT)
Dept: GASTROENTEROLOGY | Facility: AMBULARY SURGERY CENTER | Age: 59
Setting detail: OUTPATIENT SURGERY
Discharge: HOME | End: 2019-06-13
Attending: INTERNAL MEDICINE

## 2019-07-02 ENCOUNTER — TELEPHONE (OUTPATIENT)
Dept: GASTROENTEROLOGY | Facility: CLINIC | Age: 59
End: 2019-07-02

## 2019-07-02 NOTE — TELEPHONE ENCOUNTER
Dr Renetta Dumont pt  [facility] called stating pt has new insurance and will require a prior auth for the protonix two times a day   You can obtain the auth at [phone]

## 2019-07-03 NOTE — TELEPHONE ENCOUNTER
I spoke with Merlin Fraga at [facility], [facility] and submitted auth verbally as Anni Wilson is currently down  [place] Santos is pending  I used Dx K85 90, supporting codes of K27 9 ulcer, K20 9 esophagitis  "Due to gallstone pancreatitis pt had severe esophagitis and duodenitis needs ppi 40 bid for ulcer healing and inflammation"    Wen Benavidez is pending [facility], [facility] 14 day turn around   Ref# [id number]( member ID)

## 2019-07-15 NOTE — TELEPHONE ENCOUNTER
Per incoming fax from Ryder Brandt for Pantoprazole has been reviewed and approved   Valid from 7/3/19-1/2/20  Faxed over medication auth to Simone Valdez @ [phone]

## 2019-07-24 ENCOUNTER — TELEPHONE (OUTPATIENT)
Dept: GASTROENTEROLOGY | Facility: CLINIC | Age: 59
End: 2019-07-24

## 2019-07-24 NOTE — PRE-PROCEDURE INSTRUCTIONS
Pre-Surgery Instructions:   Medication Instructions    ascorbic acid (VITAMIN C) 1000 MG tablet Patient was instructed by Physician and understands   B COMPLEX-BIOTIN-FA PO Patient was instructed by Physician and understands   multivitamin [facility]) TABS Patient was instructed by Physician and understands   pantoprazole (PROTONIX) 40 mg tablet Patient was instructed by Physician and understands

## 2019-07-24 NOTE — TELEPHONE ENCOUNTER
LMOM for pt to call office   Activation Code not generated for patient is still pending with [facility]   Procedure will have to be cancelled

## 2019-07-25 ENCOUNTER — HOSPITAL ENCOUNTER (OUTPATIENT)
Dept: GASTROENTEROLOGY | Facility: AMBULARY SURGERY CENTER | Age: 59
Setting detail: OUTPATIENT SURGERY
Discharge: HOME | End: 2019-07-25
Attending: INTERNAL MEDICINE
Payer: COMMERCIAL

## 2019-07-25 ENCOUNTER — ANESTHESIA (OUTPATIENT)
Dept: GASTROENTEROLOGY | Facility: AMBULARY SURGERY CENTER | Age: 59
End: 2019-07-25

## 2019-07-25 ENCOUNTER — ANESTHESIA EVENT (OUTPATIENT)
Dept: GASTROENTEROLOGY | Facility: AMBULARY SURGERY CENTER | Age: 59
End: 2019-07-25

## 2019-07-25 VITALS
SYSTOLIC BLOOD PRESSURE: 134 MMHG | OXYGEN SATURATION: 98 % | TEMPERATURE: 98.5 F | RESPIRATION RATE: 18 BRPM | HEIGHT: 74 IN | BODY MASS INDEX: 27.08 KG/M2 | DIASTOLIC BLOOD PRESSURE: 77 MMHG | HEART RATE: 73 BPM | WEIGHT: 211 LBS

## 2019-07-25 DIAGNOSIS — K25.5 GASTRIC ULCER WITH PERFORATION, UNSPECIFIED CHRONICITY (HCC): ICD-10-CM

## 2019-07-25 PROCEDURE — 43239 EGD BIOPSY SINGLE/MULTIPLE: CPT | Performed by: INTERNAL MEDICINE

## 2019-07-25 PROCEDURE — 88342 IMHCHEM/IMCYTCHM 1ST ANTB: CPT | Performed by: PATHOLOGY

## 2019-07-25 PROCEDURE — 88305 TISSUE EXAM BY PATHOLOGIST: CPT | Performed by: PATHOLOGY

## 2019-07-25 RX ORDER — PROPOFOL 10 MG/ML
INJECTION, EMULSION INTRAVENOUS AS NEEDED
Status: DISCONTINUED | OUTPATIENT
Start: 2019-07-25 | End: 2019-07-25 | Stop reason: SURG

## 2019-07-25 RX ORDER — LIDOCAINE HYDROCHLORIDE 10 MG/ML
INJECTION, SOLUTION INFILTRATION; PERINEURAL AS NEEDED
Status: DISCONTINUED | OUTPATIENT
Start: 2019-07-25 | End: 2019-07-25 | Stop reason: SURG

## 2019-07-25 RX ORDER — SODIUM CHLORIDE, SODIUM LACTATE, POTASSIUM CHLORIDE, CALCIUM CHLORIDE 600; 310; 30; 20 MG/100ML; MG/100ML; MG/100ML; MG/100ML
75 INJECTION, SOLUTION INTRAVENOUS CONTINUOUS
Status: DISCONTINUED | OUTPATIENT
Start: 2019-07-25 | End: 2019-07-29 | Stop reason: HOSPADM

## 2019-07-25 RX ADMIN — PROPOFOL 50 MG: 10 INJECTION, EMULSION INTRAVENOUS at 10:11

## 2019-07-25 RX ADMIN — SODIUM CHLORIDE, SODIUM LACTATE, POTASSIUM CHLORIDE, AND CALCIUM CHLORIDE: .6; .31; .03; .02 INJECTION, SOLUTION INTRAVENOUS at 10:05

## 2019-07-25 RX ADMIN — LIDOCAINE HYDROCHLORIDE 50 MG: 10 INJECTION, SOLUTION INFILTRATION; PERINEURAL at 10:07

## 2019-07-25 RX ADMIN — PROPOFOL 150 MG: 10 INJECTION, EMULSION INTRAVENOUS at 10:07

## 2019-07-25 RX ADMIN — SODIUM CHLORIDE, SODIUM LACTATE, POTASSIUM CHLORIDE, AND CALCIUM CHLORIDE 75 ML/HR: .6; .31; .03; .02 INJECTION, SOLUTION INTRAVENOUS at 09:57

## 2019-07-25 NOTE — H&P
History and Physical - [facility]  Rebekah Vega 61 y o  male MRN: [id number]    HPI: Rebekah Vega is a 61y o  year old male who presents for evaluation of esophagitis and esophageal ulcer  Review of Systems    Historical Information   Past Medical History:   Diagnosis Date    GERD (gastroesophageal reflux disease)     Pancreatitis     blocked by gall stones     Past Surgical History:   Procedure Laterality Date    CHOLECYSTECTOMY LAPAROSCOPIC N/A 4/15/2019    Procedure: CHOLECYSTECTOMY LAPAROSCOPIC;  Surgeon: Lou Beach MD;  Location: [address] [place] [address];  Service: General    ERCP N/A 4/15/2019    Procedure: ENDOSCOPIC RETROGRADE CHOLANGIOPANCREATOGRAPHY (ERCP); Surgeon: Lou Beach MD;  Location: [address] [place] [address];  Service: General     Social History   Social History     Substance and Sexual Activity   Alcohol Use Yes    Frequency: Monthly or less    Drinks per session: 1 or 2    Binge frequency: Never    Comment: Rarely, every 3-4 months     Social History     Substance and Sexual Activity   Drug Use Not Currently     Social History     Tobacco Use   Smoking Status Never Smoker   Smokeless Tobacco Never Used     Family History   Problem Relation Age of Onset    No Known Problems Mother     No Known Problems Father     No Known Problems Brother     No Known Problems Brother        Meds/Allergies       (Not in a hospital admission)    Allergies   Allergen Reactions    Azithromycin Diarrhea       Objective     /83   Pulse 60   Temp 98 5 °F (36 9 °C) (Tympanic)   Resp 16   Ht 6' 2" (1 88 m)   Wt 95 7 kg (211 lb)   SpO2 99%   BMI 27 09 kg/m²       PHYSICAL EXAM    Gen: NAD  CV: RRR  CHEST: Clear  ABD: soft, NT/ND  EXT: no edema  Neuro: AAO      ASSESSMENT/PLAN:  This is a 61y o  year old male here for evaluation of Escalona's, has history of esophagitis and esophageal ulcer  He has been on Protonix 40 mg b i d  For the past 3 months  PLAN:   Procedure:  EGD

## 2019-07-25 NOTE — ANESTHESIA PREPROCEDURE EVALUATION
Review of Systems/Medical History  Patient summary reviewed  Chart reviewed  No history of anesthetic complications     Cardiovascular   Pulmonary       GI/Hepatic    PUD (gastric ulcer), GERD ,             Endo/Other     GYN       Hematology   Musculoskeletal       Neurology   Psychology           Physical Exam    Airway    Mallampati score: II  TM Distance: >3 FB  Neck ROM: full     Dental       Cardiovascular  Rhythm: regular, Rate: normal,     Pulmonary  Breath sounds clear to auscultation,     Other Findings        Anesthesia Plan  ASA Score- 2     Anesthesia Type- IV sedation with anesthesia with ASA Monitors  Additional Monitors:   Airway Plan:         Plan Factors-    Induction- intravenous  Postoperative Plan-     Informed Consent- Anesthetic plan and risks discussed with patient  I personally reviewed this patient with the CRNA  Discussed and agreed on the Anesthesia Plan with the CRNA  Maritza Oliver

## 2019-07-25 NOTE — ANESTHESIA POSTPROCEDURE EVALUATION
Post-Op Assessment Note    CV Status:  Stable  Pain Score: 0    Pain management: adequate     Mental Status:  Sleepy   Hydration Status:  Stable   PONV Controlled:  None   Airway Patency:  Patent   Post Op Vitals Reviewed: Yes      Staff: Anesthesiologist           BP      Temp      Pulse     Resp      SpO2

## 2019-07-25 NOTE — TELEPHONE ENCOUNTER
Pt has been APPROVED   [address] [place] [address] under referral in patient's chart  Pt has been notified and on the way to [facility] Surgery center

## 2019-07-31 ENCOUNTER — TELEPHONE (OUTPATIENT)
Dept: GASTROENTEROLOGY | Facility: CLINIC | Age: 59
End: 2019-07-31

## 2019-07-31 NOTE — TELEPHONE ENCOUNTER
----- Message from Tootie Dean MD sent at 7/30/2019  9:55 PM EDT -----  Please inform the patient that there was no evidence of Escalona's esophagus  Gastric biopsies were negative for H pylori but do show chronic gastritis  Would continue omeprazole 40 mg daily for now, follow up in the office in 6-8 weeks  Will likely titrate down the PPI to 20 mg at that time

## 2019-09-04 ENCOUNTER — TELEPHONE (OUTPATIENT)
Dept: GASTROENTEROLOGY | Facility: AMBULARY SURGERY CENTER | Age: 59
End: 2019-09-04

## 2019-09-04 DIAGNOSIS — K85.90 PANCREATITIS: ICD-10-CM

## 2019-09-04 RX ORDER — PANTOPRAZOLE SODIUM 40 MG/1
40 TABLET, DELAYED RELEASE ORAL DAILY
Qty: 60 TABLET | Refills: 1 | Status: SHIPPED | OUTPATIENT
Start: 2019-09-04 | End: 2019-12-23 | Stop reason: ALTCHOICE

## 2019-09-25 ENCOUNTER — OFFICE VISIT (OUTPATIENT)
Dept: GASTROENTEROLOGY | Facility: CLINIC | Age: 59
End: 2019-09-25
Payer: COMMERCIAL

## 2019-09-25 VITALS
HEIGHT: 74 IN | BODY MASS INDEX: 29.05 KG/M2 | TEMPERATURE: 98.2 F | SYSTOLIC BLOOD PRESSURE: 126 MMHG | DIASTOLIC BLOOD PRESSURE: 78 MMHG | HEART RATE: 67 BPM | WEIGHT: 226.38 LBS

## 2019-09-25 DIAGNOSIS — Z12.11 COLON CANCER SCREENING: ICD-10-CM

## 2019-09-25 DIAGNOSIS — K21.00 GASTROESOPHAGEAL REFLUX DISEASE WITH ESOPHAGITIS: Primary | ICD-10-CM

## 2019-09-25 PROCEDURE — 99213 OFFICE O/P EST LOW 20 MIN: CPT | Performed by: PHYSICIAN ASSISTANT

## 2019-09-25 RX ORDER — CA/D3/MAG OX/ZINC/COP/MANG/BOR 600 MG-800
TABLET,CHEWABLE ORAL AS NEEDED
COMMUNITY

## 2019-09-25 RX ORDER — FAMOTIDINE 20 MG/1
20 TABLET, FILM COATED ORAL DAILY PRN
Qty: 30 TABLET | Refills: 5 | Status: SHIPPED | OUTPATIENT
Start: 2019-09-25 | End: 2020-08-25 | Stop reason: SDUPTHER

## 2019-09-25 NOTE — PROGRESS NOTES
Assessment and Plan    #1  GERD with esophagitis: doing well  Asymptomatic on 40 mg daily  Last EGD was in July with some esophagitis but negative for Escalona's  -Plan to wean off  Take PPI once every other day for a few weeks then every third day for a week then stop  -Can use Pepcid as needed or daily for symptoms  -if symptoms significantly worsen when weaning, patient to call  -anti-reflux diet and measures    #2  Colon cancer screening: never had a colonoscopy, no family hx  -discussed procedure and its risks  -Suprep sample given  -plan for colonoscopy    #3  History of gallstone pancreatitis s/p ERCP and lap aleksey  -asymptomatic  --------------------------------------------------------------------------------------------------------------------    Chief Complaint:  Follow-up GERD    HPI: Torito Manzo is a 61 y o  male with no significant past medical history who presents today for follow up for for GERD  Patient was seen by us in the hospital earlier this year secondary to gallstone pancreatitis and underwent ERCP and lap choly  During his ERCP he had an EGD which showed severe esophagitis  He had a repeat EGD performed in July which showed the esophagitis had been healing but there was a tongue in the esophagus that was biopsied  This was negative for Escalona's esophagus  He has been on PPI once daily since that time  He reports that his symptoms are very well controlled and denies any nausea, vomiting, heartburn, or trouble swallowing  He denies any changes to his bowel movements  Denies any diarrhea, constipation, blood in the stool, or black tarry stools  He reports his appetite is good and denies any unexplained weight loss  He denies any abdominal pain      Never had a colonoscopy in the past and denies any family history of colon cancer      Review of Systems:   General: negative for fatigue, fever, night sweats or unexpected weight loss  Psychological: negative for anxiety or depression  Ophthalmic: negative for blurry vision or scleral icterus  ENT: negative for headaches, oral lesions, sore throat, vocal changes or dysphagia  Hematological and Lymphatic: negative for pallor or swollen lymph nodes  Respiratory: negative for cough, shortness of breath or wheezing  Cardiovascular: negative for chest pain, edema or murmur  Gastrointestinal: as mentioned in HPI  Genito-Urinary: negative for dysuria or incontinence  Musculoskeletal: negative for joint pain, joint stiffness or joint swelling  Dermatological: negative for pruritus, rash, or jaundice    Current Medications  Current Outpatient Medications   Medication Sig Dispense Refill    ascorbic acid (VITAMIN C) 1000 MG tablet Take 250 mg by mouth daily      B COMPLEX-BIOTIN-FA PO Take by mouth      multivitamin (THERAGRAN) TABS Take 1 tablet by mouth daily      pantoprazole (PROTONIX) 40 mg tablet Take 1 tablet (40 mg total) by mouth daily 60 tablet 1    Potassium Bicarbonate 99 MG CAPS Take by mouth      Probiotic Product (PROBIOTIC ADVANCED) CAPS Take by mouth      famotidine (PEPCID) 20 mg tablet Take 1 tablet (20 mg total) by mouth daily as needed for indigestion or heartburn 30 tablet 5     No current facility-administered medications for this visit  Past Medical History  Past Medical History:   Diagnosis Date    GERD (gastroesophageal reflux disease)     Pancreatitis     blocked by gall stones       Past Surgical History  Past Surgical History:   Procedure Laterality Date    CHOLECYSTECTOMY LAPAROSCOPIC N/A 4/15/2019    Procedure: CHOLECYSTECTOMY LAPAROSCOPIC;  Surgeon: Cheo Hayes MD;  Location: [address] [place] [address];  Service: General    ERCP N/A 4/15/2019    Procedure: ENDOSCOPIC RETROGRADE CHOLANGIOPANCREATOGRAPHY (ERCP);   Surgeon: Cheo Hayes MD;  Location: [address] [place] [address];  Service: General       Past Social History   Social History     Socioeconomic History    Marital status: /Civil Union     Spouse name: None    Number of children: None    Years of education: None    Highest education level: None   Occupational History    None   Social Needs    Financial resource strain: None    Food insecurity:     Worry: None     Inability: None    Transportation needs:     Medical: None     Non-medical: None   Tobacco Use    Smoking status: Never Smoker    Smokeless tobacco: Never Used   Substance and Sexual Activity    Alcohol use: Yes     Frequency: Monthly or less     Drinks per session: 1 or 2     Binge frequency: Never     Comment: Rarely, every 3-4 months    Drug use: Not Currently    Sexual activity: Not Currently   Lifestyle    Physical activity:     Days per week: None     Minutes per session: None    Stress: None   Relationships    Social connections:     Talks on phone: None     Gets together: None     Attends Protestant service: None     Active member of club or organization: None     Attends meetings of clubs or organizations: None     Relationship status: None    Intimate partner violence:     Fear of current or ex partner: None     Emotionally abused: None     Physically abused: None     Forced sexual activity: None   Other Topics Concern    None   Social History Narrative    None       The following portions of the patient's history were reviewed and updated as appropriate: allergies, current medications, past family history, past medical history, past social history, past surgical history and problem list     Vital Signs  Vitals:    09/25/19 0957   BP: 126/78   BP Location: Right arm   Patient Position: Sitting   Cuff Size: Standard   Pulse: 67   Temp: 98 2 °F (36 8 °C)   Weight: 103 kg (226 lb 6 oz)   Height: 6' 2" (1 88 m)       Physical Exam:  General appearance: alert, cooperative, no distress  HEENT: normocephalic, anicteric, no eye erythema or discharge, no oropharyngeal thrush  Neck: supple, trachea midline, no adenopathy  Lungs: CTA b/l, no rales, rhonchi, or wheezing, unlabored respirations  Heart: RRR, no murmur, rubs, or gallops  Abdomen: soft, non-tender, non-distended, normal bowel sounds, no masses or organomegaly  Rectal: deferred  Extremities: no cyanosis, clubbing, or edema  Musculoskeletal: normal gait  Skin: color and texture normal, no jaundice, no rashes or lesions  Psychiatric: alert and oriented, normal affect and behavior

## 2019-10-25 NOTE — PRE-PROCEDURE INSTRUCTIONS
Pre-Surgery Instructions:   Medication Instructions    ascorbic acid (VITAMIN C) 1000 MG tablet Patient was instructed by Physician and understands   B COMPLEX-BIOTIN-FA PO Patient was instructed by Physician and understands   famotidine (PEPCID) 20 mg tablet Patient was instructed by Physician and understands   multivitamin [facility]) TABS Patient was instructed by Physician and understands   pantoprazole (PROTONIX) 40 mg tablet Patient was instructed by Physician and understands   Potassium Bicarbonate 99 MG CAPS Patient was instructed by Physician and understands   Probiotic Product (PROBIOTIC ADVANCED) CAPS Patient was instructed by Physician and understands  Pt to follow Dr Nain Savage' instructions    Rosaline Garcia [phone]

## 2019-10-28 ENCOUNTER — ANESTHESIA EVENT (OUTPATIENT)
Dept: GASTROENTEROLOGY | Facility: AMBULARY SURGERY CENTER | Age: 59
End: 2019-10-28

## 2019-10-28 NOTE — ANESTHESIA PREPROCEDURE EVALUATION
Review of Systems/Medical History  Patient summary reviewed  Chart reviewed  No history of anesthetic complications     Cardiovascular  Exercise tolerance (METS): >4,  Dysrhythmias (h/o bradycardia) , No angina , No SELBY,    Pulmonary  Negative pulmonary ROS Not a smoker , No shortness of breath, No recent URI ,        GI/Hepatic    PUD (gastric ulcer), GERD , Pancreatic problem (h/o pancreatitis 2/2 cholelithiasis), Bowel prep            Endo/Other     GYN       Hematology   Musculoskeletal       Neurology   Psychology           Physical Exam    Airway    Mallampati score: II  TM Distance: >3 FB  Neck ROM: full     Dental       Cardiovascular  Rhythm: regular, Rate: normal,     Pulmonary  Breath sounds clear to auscultation,     Other Findings        Anesthesia Plan  ASA Score- 2     Anesthesia Type- IV sedation with anesthesia with ASA Monitors  Additional Monitors:   Airway Plan:         Plan Factors-    Induction- intravenous  Postoperative Plan-     Informed Consent- Anesthetic plan and risks discussed with patient

## 2019-10-29 ENCOUNTER — ANESTHESIA (OUTPATIENT)
Dept: GASTROENTEROLOGY | Facility: AMBULARY SURGERY CENTER | Age: 59
End: 2019-10-29

## 2019-10-29 ENCOUNTER — HOSPITAL ENCOUNTER (OUTPATIENT)
Dept: GASTROENTEROLOGY | Facility: AMBULARY SURGERY CENTER | Age: 59
Setting detail: OUTPATIENT SURGERY
Discharge: HOME | End: 2019-10-29
Payer: COMMERCIAL

## 2019-10-29 VITALS
WEIGHT: 220 LBS | BODY MASS INDEX: 28.23 KG/M2 | HEIGHT: 74 IN | SYSTOLIC BLOOD PRESSURE: 131 MMHG | DIASTOLIC BLOOD PRESSURE: 71 MMHG | OXYGEN SATURATION: 97 % | RESPIRATION RATE: 18 BRPM | HEART RATE: 71 BPM | TEMPERATURE: 97 F

## 2019-10-29 DIAGNOSIS — Z12.11 COLON CANCER SCREENING: ICD-10-CM

## 2019-10-29 PROCEDURE — 45385 COLONOSCOPY W/LESION REMOVAL: CPT | Performed by: INTERNAL MEDICINE

## 2019-10-29 PROCEDURE — 88305 TISSUE EXAM BY PATHOLOGIST: CPT | Performed by: PATHOLOGY

## 2019-10-29 RX ORDER — PROPOFOL 10 MG/ML
INJECTION, EMULSION INTRAVENOUS AS NEEDED
Status: DISCONTINUED | OUTPATIENT
Start: 2019-10-29 | End: 2019-10-29 | Stop reason: SURG

## 2019-10-29 RX ORDER — LIDOCAINE HYDROCHLORIDE 10 MG/ML
INJECTION, SOLUTION INFILTRATION; PERINEURAL AS NEEDED
Status: DISCONTINUED | OUTPATIENT
Start: 2019-10-29 | End: 2019-10-29 | Stop reason: SURG

## 2019-10-29 RX ORDER — ONDANSETRON 2 MG/ML
4 INJECTION INTRAMUSCULAR; INTRAVENOUS ONCE AS NEEDED
Status: DISCONTINUED | OUTPATIENT
Start: 2019-10-29 | End: 2019-11-02 | Stop reason: HOSPADM

## 2019-10-29 RX ORDER — PROPOFOL 10 MG/ML
INJECTION, EMULSION INTRAVENOUS CONTINUOUS PRN
Status: DISCONTINUED | OUTPATIENT
Start: 2019-10-29 | End: 2019-10-29 | Stop reason: SURG

## 2019-10-29 RX ORDER — SODIUM CHLORIDE 9 MG/ML
125 INJECTION, SOLUTION INTRAVENOUS CONTINUOUS
Status: DISCONTINUED | OUTPATIENT
Start: 2019-10-29 | End: 2019-11-02 | Stop reason: HOSPADM

## 2019-10-29 RX ADMIN — PROPOFOL 20 MG: 10 INJECTION, EMULSION INTRAVENOUS at 11:49

## 2019-10-29 RX ADMIN — PROPOFOL 120 MCG/KG/MIN: 10 INJECTION, EMULSION INTRAVENOUS at 11:47

## 2019-10-29 RX ADMIN — SODIUM CHLORIDE: 0.9 INJECTION, SOLUTION INTRAVENOUS at 11:39

## 2019-10-29 RX ADMIN — PROPOFOL 80 MG: 10 INJECTION, EMULSION INTRAVENOUS at 11:47

## 2019-10-29 RX ADMIN — SODIUM CHLORIDE 125 ML/HR: 0.9 INJECTION, SOLUTION INTRAVENOUS at 10:30

## 2019-10-29 RX ADMIN — LIDOCAINE HYDROCHLORIDE 50 MG: 10 INJECTION, SOLUTION INFILTRATION; PERINEURAL at 11:47

## 2019-10-29 NOTE — H&P
[facility] and Physical -  [facility]  Rodolfo Ovalle 61 y o  male MRN: [id number]    HPI: Rodolfo Ovalle is a 61y o  year old male who presents for colon cancer screening  Review of Systems    Historical Information   Past Medical History:   Diagnosis Date    GERD (gastroesophageal reflux disease)     Pancreatitis     blocked by gall stones    Wears glasses      Past Surgical History:   Procedure Laterality Date    CHOLECYSTECTOMY      Lap    CHOLECYSTECTOMY LAPAROSCOPIC N/A 4/15/2019    Procedure: CHOLECYSTECTOMY LAPAROSCOPIC;  Surgeon: Lelo Holt MD;  Location: [address] [place] [address];  Service: General    EGD      ERCP N/A 4/15/2019    Procedure: ENDOSCOPIC RETROGRADE CHOLANGIOPANCREATOGRAPHY (ERCP); Surgeon: Lelo Holt MD;  Location: [address] [place] [address];  Service: General    WISDOM TOOTH EXTRACTION      x4     Social History   Social History     Substance and Sexual Activity   Alcohol Use Yes    Frequency: Monthly or less    Drinks per session: 1 or 2    Binge frequency: Never    Comment: Rarely, every 3-4 months     Social History     Substance and Sexual Activity   Drug Use Not Currently     Social History     Tobacco Use   Smoking Status Never Smoker   Smokeless Tobacco Never Used     Family History   Problem Relation Age of Onset    No Known Problems Mother     No Known Problems Father     No Known Problems Brother     No Known Problems Brother        Meds/Allergies       (Not in a hospital admission)    Allergies   Allergen Reactions    Azithromycin Diarrhea       Objective     /85   Pulse 55   Temp (!) 97 °F (36 1 °C) (Tympanic)   Resp 16   Ht 6' 2" (1 88 m)   Wt 99 8 kg (220 lb)   SpO2 99%   BMI 28 25 kg/m²       PHYSICAL EXAM    Gen: NAD  CV: RRR  CHEST: Clear  ABD: soft, NT/ND  EXT: no edema  Neuro: AAO      ASSESSMENT/PLAN:  This is a 61y o  year old male here for colon cancer screening  PLAN:   Procedure:  Colonoscopy

## 2019-11-12 ENCOUNTER — TELEPHONE (OUTPATIENT)
Dept: GASTROENTEROLOGY | Facility: CLINIC | Age: 59
End: 2019-11-12

## 2019-11-12 NOTE — TELEPHONE ENCOUNTER
----- Message from Joseline Keen MD sent at 11/12/2019 10:48 AM EST -----  Please inform the patient that all the polyps were tubular adenoma, would recommend repeat colonoscopy in 3 years

## 2019-11-18 ENCOUNTER — APPOINTMENT (OUTPATIENT)
Dept: LAB | Facility: CLINIC | Age: 59
End: 2019-11-18
Payer: COMMERCIAL

## 2019-11-18 ENCOUNTER — OFFICE VISIT (OUTPATIENT)
Dept: FAMILY MEDICINE CLINIC | Facility: CLINIC | Age: 59
End: 2019-11-18
Payer: COMMERCIAL

## 2019-11-18 VITALS
SYSTOLIC BLOOD PRESSURE: 160 MMHG | TEMPERATURE: 96 F | DIASTOLIC BLOOD PRESSURE: 100 MMHG | BODY MASS INDEX: 29.39 KG/M2 | WEIGHT: 229 LBS | HEIGHT: 74 IN | HEART RATE: 69 BPM | RESPIRATION RATE: 18 BRPM | OXYGEN SATURATION: 98 %

## 2019-11-18 DIAGNOSIS — K92.0 HEMATEMESIS, PRESENCE OF NAUSEA NOT SPECIFIED: ICD-10-CM

## 2019-11-18 DIAGNOSIS — R79.89 LFT ELEVATION: ICD-10-CM

## 2019-11-18 DIAGNOSIS — M77.12 LATERAL EPICONDYLITIS OF LEFT ELBOW: Primary | ICD-10-CM

## 2019-11-18 DIAGNOSIS — R94.5 NONSPECIFIC ABNORMAL RESULTS OF LIVER FUNCTION STUDY: ICD-10-CM

## 2019-11-18 DIAGNOSIS — R68.82 DECREASED LIBIDO: Primary | ICD-10-CM

## 2019-11-18 DIAGNOSIS — R68.82 DECREASED LIBIDO: ICD-10-CM

## 2019-11-18 DIAGNOSIS — Z13.220 NEED FOR LIPID SCREENING: ICD-10-CM

## 2019-11-18 DIAGNOSIS — Z13.220 SCREENING FOR LIPOID DISORDERS: ICD-10-CM

## 2019-11-18 PROBLEM — K85.10 GALLSTONE PANCREATITIS: Status: RESOLVED | Noted: 2019-04-10 | Resolved: 2019-11-18

## 2019-11-18 PROBLEM — R00.1 BRADYCARDIA: Status: RESOLVED | Noted: 2019-04-11 | Resolved: 2019-11-18

## 2019-11-18 LAB
ALBUMIN SERPL BCP-MCNC: 4.4 G/DL (ref 3.5–5)
ALP SERPL-CCNC: 74 U/L (ref 46–116)
ALT SERPL W P-5'-P-CCNC: 20 U/L (ref 12–78)
ANION GAP SERPL CALCULATED.3IONS-SCNC: 7 MMOL/L (ref 4–13)
AST SERPL W P-5'-P-CCNC: 14 U/L (ref 5–45)
BASOPHILS # BLD AUTO: 0.03 THOUSANDS/ΜL (ref 0–0.1)
BASOPHILS NFR BLD AUTO: 0 % (ref 0–1)
BILIRUB SERPL-MCNC: 0.63 MG/DL (ref 0.2–1)
BUN SERPL-MCNC: 19 MG/DL (ref 5–25)
CALCIUM SERPL-MCNC: 9.3 MG/DL (ref 8.3–10.1)
CHLORIDE SERPL-SCNC: 110 MMOL/L (ref 100–108)
CHOLEST SERPL-MCNC: 188 MG/DL (ref 50–200)
CO2 SERPL-SCNC: 28 MMOL/L (ref 21–32)
CREAT SERPL-MCNC: 1.17 MG/DL (ref 0.6–1.3)
EOSINOPHIL # BLD AUTO: 0.12 THOUSAND/ΜL (ref 0–0.61)
EOSINOPHIL NFR BLD AUTO: 1 % (ref 0–6)
ERYTHROCYTE [DISTWIDTH] IN BLOOD BY AUTOMATED COUNT: 13.7 % (ref 11.6–15.1)
GFR SERPL CREATININE-BSD FRML MDRD: 68 ML/MIN/1.73SQ M
GLUCOSE P FAST SERPL-MCNC: 102 MG/DL (ref 65–99)
HCT VFR BLD AUTO: 51.1 % (ref 36.5–49.3)
HDLC SERPL-MCNC: 60 MG/DL
HGB BLD-MCNC: 16.9 G/DL (ref 12–17)
IMM GRANULOCYTES # BLD AUTO: 0.04 THOUSAND/UL (ref 0–0.2)
IMM GRANULOCYTES NFR BLD AUTO: 1 % (ref 0–2)
LDLC SERPL CALC-MCNC: 113 MG/DL (ref 0–100)
LYMPHOCYTES # BLD AUTO: 2.14 THOUSANDS/ΜL (ref 0.6–4.47)
LYMPHOCYTES NFR BLD AUTO: 26 % (ref 14–44)
MCH RBC QN AUTO: 29.5 PG (ref 26.8–34.3)
MCHC RBC AUTO-ENTMCNC: 33.1 G/DL (ref 31.4–37.4)
MCV RBC AUTO: 89 FL (ref 82–98)
MONOCYTES # BLD AUTO: 0.65 THOUSAND/ΜL (ref 0.17–1.22)
MONOCYTES NFR BLD AUTO: 8 % (ref 4–12)
NEUTROPHILS # BLD AUTO: 5.32 THOUSANDS/ΜL (ref 1.85–7.62)
NEUTS SEG NFR BLD AUTO: 64 % (ref 43–75)
NONHDLC SERPL-MCNC: 128 MG/DL
NRBC BLD AUTO-RTO: 0 /100 WBCS
PLATELET # BLD AUTO: 216 THOUSANDS/UL (ref 149–390)
PMV BLD AUTO: 9.3 FL (ref 8.9–12.7)
POTASSIUM SERPL-SCNC: 4.9 MMOL/L (ref 3.5–5.3)
PROT SERPL-MCNC: 7.3 G/DL (ref 6.4–8.2)
RBC # BLD AUTO: 5.72 MILLION/UL (ref 3.88–5.62)
SODIUM SERPL-SCNC: 145 MMOL/L (ref 136–145)
TRIGL SERPL-MCNC: 73 MG/DL
WBC # BLD AUTO: 8.3 THOUSAND/UL (ref 4.31–10.16)

## 2019-11-18 PROCEDURE — 85025 COMPLETE CBC W/AUTO DIFF WBC: CPT

## 2019-11-18 PROCEDURE — 84403 ASSAY OF TOTAL TESTOSTERONE: CPT

## 2019-11-18 PROCEDURE — 80061 LIPID PANEL: CPT

## 2019-11-18 PROCEDURE — 80053 COMPREHEN METABOLIC PANEL: CPT

## 2019-11-18 PROCEDURE — 99214 OFFICE O/P EST MOD 30 MIN: CPT | Performed by: FAMILY MEDICINE

## 2019-11-18 PROCEDURE — 84402 ASSAY OF FREE TESTOSTERONE: CPT

## 2019-11-18 PROCEDURE — 36415 COLL VENOUS BLD VENIPUNCTURE: CPT

## 2019-11-18 NOTE — PROGRESS NOTES
HPI:  presents with  left elbow  pain for past few weeks  Severity 7-8  CBD oil helped  Now a 3  Strained it in July  H/o elbow issues in past from carrying heavy musical instruments  L hand dominant or writing and throwing  Other concerns to be further addressed at f/u are low libido and general physical and health    Significant trauma history? no  Significant overuse history? yes  What makes symptoms worse: lifting heavy objects  What makes symptoms better/treatments that have helped: rest    Radiation? no  Severity range: mild to moderate   Associated symptoms: neg    Previous Physical therapy? no    Workman's compensation or legal case? no    Red flag screens: No unexplained fevers/chills/sweats/weight loss/history of IVDA/immunosuppressive disorder/personal history of cancer/unusual night pain/deep bone pain/unexplained or persistent neck pain/unexplained neurological symptoms such as numbness, motor weakness NEG    Exercise/sport history: no    Chart review:   Relevant past medical and surgery history, problem list, medications, allergies reviewed   Imaging not done   Previous treating physicians none   CNS pain processing dysfunction clues (multiple functional dx, mood disorders, possible  secondary gain conducive situation? Risk appears: low       EXAM: Vital signs as noted  /100 (BP Location: Left arm, Patient Position: Sitting, Cuff Size: Standard)   Pulse 69   Temp (!) 96 °F (35 6 °C) (Tympanic)   Resp 18   Ht 6' 2" (1 88 m)   Wt 104 kg (229 lb)   SpO2 98%   BMI 29 40 kg/m²     Alert, No acute distress  Posture:   General coordination/movement integration: fair   General arm fitness level fair      left elbow inspected:  ? Inspection & palpation shows tenderness +  ? Active range of motion  Full and painless  ? Passive range of motion Full and painless  ? Muscle testing of elbow neg except as below  ? Special tests for lateral epicondylitis +  ?  Distal neurovascular is intact  ? Radiating  symptoms screen of cervical spine, shoulder unlikely    General summary and Impression: 1  Lateral epicondylitis of left elbow  Taught do's and don'ts in lifting  Feldenkrais exercises for arm  - Ambulatory referral to Physical Therapy; Future    2  Hematemesis, presence of nausea not specified  History of    - CBC and differential; Future    3  Need for lipid screening    - Lipid panel; Future    4  Decreased libido  To be addressed further at f/u  - Testosterone, free, total; Future  - Testosterone; Future    5  LFT elevation  History of  - Comprehensive metabolic panel;  Future  - CBC and differential; Future    Future Appointments   Date Time Provider Ton Cho   11/20/2019  3:00 PM [address], OT [facility] OT [facility]   12/23/2019  8:00 AM Estefani Ya MD [facility]   Physical, f/u above issues

## 2019-11-18 NOTE — PATIENT INSTRUCTIONS
For Alison Mind/body integration exercises, a good resource is www  [employer]  You can buy an MP3 download, CD's or sometimes also DVDs  Prices are subject to change    Available programs include (Navigation: 'Shop online' --> 'Shop specific themes' -->):       Arm & hand exercises: Optimal Moves: Effortless Use of the Arms and Hands Vol I Audio Set  Vol  1 & 2 $48 each

## 2019-11-19 LAB
TESTOST FREE SERPL-MCNC: 13 PG/ML (ref 7.2–24)
TESTOST SERPL-MCNC: 418 NG/DL (ref 264–916)

## 2019-11-20 ENCOUNTER — EVALUATION (OUTPATIENT)
Dept: OCCUPATIONAL THERAPY | Facility: CLINIC | Age: 59
End: 2019-11-20
Payer: COMMERCIAL

## 2019-11-20 DIAGNOSIS — M77.12 LATERAL EPICONDYLITIS OF LEFT ELBOW: Primary | ICD-10-CM

## 2019-11-20 PROCEDURE — 97165 OT EVAL LOW COMPLEX 30 MIN: CPT | Performed by: OCCUPATIONAL THERAPIST

## 2019-11-20 NOTE — PROGRESS NOTES
OT Evaluation     Today's date: 2019  Patient name: Chanel Staples  : 1960  MRN: [id number]  Referring provider: Nara Clemente MD  Dx:   Encounter Diagnosis     ICD-10-CM    1  Lateral epicondylitis of left elbow M77 12 Ambulatory referral to Physical Therapy       Start Time: 0300  Stop Time: 0400  Total time in clinic (min): 60 minutes      CASE SUMMARY:   Chanel Staples is a 61y o  year old male who has been experiencing left ebow pain since 2019 when he picked up 2 bags of ice at a picinic  Patient also reports picking up heavy instruments in the past that resulted in similar pain  He is left hand dominant  PMHx includes: See chart for full details with medications  Enrike Ye lives with his partner  Some of his interests include: playing musical instruments, baking and decorating  He is retired  The following is a summary of his status  EDEMA: Enrike Ye presents with minimal to no elbow edema  At elbow crease:  R= 29 cm  L=29 5 cm    ROM: Enrike Ye presents with good general ROM in the elbow and wrist  He has some tightness in the forearm extensors  Elbow Extension= 0  Supination=75  Elbow Flexion   =140  Pronation  =85    A little tender with end range elbow flexion  Wrist Flexion: 60  Wrist Extension: 65    STRENGTH: Leo presents with decreased wrist and hand strength  Wrist Extension:  4+/5  Wrist Flexion:      4+/5  Pain with resisted extension   Strength: R= 75  L= 60 lbsF  Lateral Pinch: R= 22  L= 25 lbsF  3 Point Pinch: R= 20  L= 19 lbsF  2 Point Pinch: R= 15  L= 16 lbsF       PAIN LEVEL:  Current: 2/10 At Best: 2/10   At worst: 8/10   Location:  Lateral elbow, sharp pain and a dull ache  Pain with resisted extension of wrist     SENSATION:  Enrike Ye reports no sensory problems  No numbness reported  FUNCTIONAL SUMMARY:   Chanel Staples is independent in basic self care skills  He has mild to moderate difficulty with lifting, cooking, and cleaning tasks   FOTO score is 65% with a 72% prediction in function  IMPAIRMENT SUMMARY:  Ashlee Head presents with:  Minimal to no elbow edema,  Good general elbow ROM,  Decreased wrist and hand strength,  Increased pain at 2-8/10  Intact sensation  Difficulty with ADLs  Patient would benefit from OT to return to prior function  Prognosis: Good  Short Term Goals: to be completed in 6-12weeks  1  Increase wrist strength to 5/5   2  Decrease pain to 0-3/10  Long Term Goals: To be completed in 8-12 weeks  1  Ability to perform lifting, carrying, cooking,cleaning tasks and work tasks with minimal to no discomfort,   2  Patient demonstrates good carryover of HEP,   3  Minimal to no pain complaints  0-2/10,   4  Improved Left elbow strength to 5/5  Patient Goals: Return to playing string instruments/different positions  Biggest concern is reducing pain  PLAN:  Therapeutic techniques to include the following:  Heat modalities: ultrasound, Hot Packs,   Manual therapy with Myofascial soft tissue mobilization techniques,  Graston/IASTM techniques, PROM, Joint mobilizations, AROM ex, Flexion taping,   Therapeutic exercises, and therapeutic activities, strengthening ex for hand and wrist, coordination and fine motor activities  Frequency/ Duration:  2x/ week for 12 weeks  Plan Of Care Dates:  11/20/19- 2/20/19      TREATMENT TODAY:     Subjective: Patient reports pain level as 2/10 today  Objective: Completed initial evaluation  See report for details  Completed Hot pack for warm up to elbow for ~5 minutes   Complete manual therapy with myofascial soft tissue work and Graston techniques to improve ROM and facilitate healing  Performed to forearm flexors, extensors and over epicondyle to relax trigger points and tight musculature  Also performed to Biceps, triceps and posterior elbow joint area  Applied kinesiotape to lateral elbow in X technique  Instructed in epicondylitis stretches and care for elbow   Recommended purchase of armband  See handout  Home Program:See Media Section for details  Epicondylitis stretches  Precautions: Lateral Epicondylitis     Assessment: Patient tolerated session well  Plan: Continue Occupational Therapy ~2x/week to decrease pain and edema and improve strength and function

## 2019-11-21 ENCOUNTER — OFFICE VISIT (OUTPATIENT)
Dept: OCCUPATIONAL THERAPY | Facility: CLINIC | Age: 59
End: 2019-11-21
Payer: COMMERCIAL

## 2019-11-21 DIAGNOSIS — M77.12 LATERAL EPICONDYLITIS OF LEFT ELBOW: Primary | ICD-10-CM

## 2019-11-21 PROCEDURE — 97140 MANUAL THERAPY 1/> REGIONS: CPT | Performed by: OCCUPATIONAL THERAPIST

## 2019-11-21 NOTE — PROGRESS NOTES
Daily Note     Today's date: 2019  Patient name: Chanel Staples  : 1960  MRN: [id number]  Referring provider: Nara Clemente MD  Dx:   Encounter Diagnosis     ICD-10-CM    1  Lateral epicondylitis of left elbow M77 12        Start Time: 1010  Stop Time: 1045  Total time in clinic (min): 35 minutes    Subjective: Patient reports pain level as 1/10 today  Was sore right after yesterday treatment but then felt better today  Objective:  Completed Hot pack for warm up to elbow for ~5 minutes  Complete manual therapy with myofascial soft tissue work and Graston techniques to improve ROM and facilitate healing  Performed to forearm flexors, extensors and over epicondyle to relax trigger points and tight musculature  Also performed to Biceps, triceps and posterior elbow joint area  Applied kinesiotape to lateral elbow in X technique  Home Program:See Media Section for details  Epicondylitis stretches  Precautions: Lateral Epicondylitis     Assessment: Patient tolerated session well  Pain decreasing nicely  Plan: Continue Occupational Therapy ~2x/week to decrease pain and edema and improve strength and function

## 2019-11-25 ENCOUNTER — OFFICE VISIT (OUTPATIENT)
Dept: OCCUPATIONAL THERAPY | Facility: CLINIC | Age: 59
End: 2019-11-25
Payer: COMMERCIAL

## 2019-11-25 DIAGNOSIS — M77.12 LATERAL EPICONDYLITIS OF LEFT ELBOW: Primary | ICD-10-CM

## 2019-11-25 PROCEDURE — 97140 MANUAL THERAPY 1/> REGIONS: CPT | Performed by: OCCUPATIONAL THERAPIST

## 2019-11-25 PROCEDURE — 97110 THERAPEUTIC EXERCISES: CPT | Performed by: OCCUPATIONAL THERAPIST

## 2019-11-25 NOTE — PROGRESS NOTES
Daily Note     Today's date: 2019  Patient name: Paul Pyle  : 1960  MRN: [id number]  Referring provider: Melisa Solomon MD  Dx:   Encounter Diagnosis     ICD-10-CM    1  Lateral epicondylitis of left elbow M77 12                   Subjective: Patient reports pain level as 1/10 today  Feeling better overall  Objective:  Completed Hot pack for warm up to elbow for ~5 minutes  Complete manual therapy with myofascial soft tissue work and Graston techniques to improve ROM and facilitate healing  Performed to forearm flexors, extensors and over epicondyle to relax trigger points and tight musculature  Applied kinesiotape to lateral elbow in X technique  Instructed in HEP with wrist weights with 1lb  See handout  Home Program:See Media Section for details  Epicondylitis stretches  Wrist weights 1lb    Precautions: Lateral Epicondylitis     Exercise Diary  19       Wrist maze x5       Wrist weights 1lb x20  Flex  Ext  Radial Dev         Pron/sup With Hammer x 20                                                                                                                                                   Assessment: Patient tolerated session well  Pain decreasing nicely  Able to upgrade to exercises today  Plan: Continue Occupational Therapy ~2x/week to decrease pain and edema and improve strength and function

## 2019-11-27 ENCOUNTER — OFFICE VISIT (OUTPATIENT)
Dept: OCCUPATIONAL THERAPY | Facility: CLINIC | Age: 59
End: 2019-11-27
Payer: COMMERCIAL

## 2019-11-27 DIAGNOSIS — M77.12 LATERAL EPICONDYLITIS OF LEFT ELBOW: Primary | ICD-10-CM

## 2019-11-27 PROCEDURE — 97110 THERAPEUTIC EXERCISES: CPT | Performed by: OCCUPATIONAL THERAPIST

## 2019-11-27 PROCEDURE — 97140 MANUAL THERAPY 1/> REGIONS: CPT | Performed by: OCCUPATIONAL THERAPIST

## 2019-11-27 NOTE — PROGRESS NOTES
Daily Note     Today's date: 2019  Patient name: Dory Burrell  : 1960  MRN: [id number]  Referring provider: Wagner Bhatia MD  Dx:   Encounter Diagnosis     ICD-10-CM    1  Lateral epicondylitis of left elbow M77 12        Start Time: 1110  Stop Time: 1145  Total time in clinic (min): 35 minutes    Subjective: Patient reports pain level as 0-1/10 today  Feeling better overall  Objective:  Completed Hot pack for warm up to elbow for ~5 minutes  Complete manual therapy with myofascial soft tissue work and Graston techniques to improve ROM and facilitate healing  Performed to forearm flexors, extensors and over epicondyle to relax trigger points and tight musculature  Applied kinesiotape to lateral elbow in X technique  Upgraded HEP with wrist weights with 2lb  Home Program:See Media Section for details  Epicondylitis stretches  Wrist weights 2lb    Precautions: Lateral Epicondylitis     Exercise Diary  19      Wrist maze x5 x5      Wrist weights 1lb x20  Flex  Ext  Radial Dev   1lb x20  Flex  Ext  Radial Dev  Ulnar Dev      Pron/sup With Hammer x 20 With Hammer x 20                                                                                                                                                  Assessment: Patient tolerated session well  Pain decreasing nicely  Able to upgrade to exercises today  Plan: Continue Occupational Therapy ~2x/week to decrease pain and edema and improve strength and function

## 2019-12-03 ENCOUNTER — OFFICE VISIT (OUTPATIENT)
Dept: OCCUPATIONAL THERAPY | Facility: CLINIC | Age: 59
End: 2019-12-03
Payer: COMMERCIAL

## 2019-12-03 DIAGNOSIS — M77.12 LATERAL EPICONDYLITIS OF LEFT ELBOW: Primary | ICD-10-CM

## 2019-12-03 PROCEDURE — 97140 MANUAL THERAPY 1/> REGIONS: CPT | Performed by: OCCUPATIONAL THERAPIST

## 2019-12-03 PROCEDURE — 97110 THERAPEUTIC EXERCISES: CPT | Performed by: OCCUPATIONAL THERAPIST

## 2019-12-03 NOTE — PROGRESS NOTES
Daily Note     Today's date: 12/3/2019  Patient name: Qasim Kaba  : 1960  MRN: [id number]  Referring provider: Trish Hilliard MD  Dx:   Encounter Diagnosis     ICD-10-CM    1  Lateral epicondylitis of left elbow M77 12        Start Time: 930  Stop Time:   Total time in clinic (min): 45 minutes    Subjective: Patient reports pain level as 0-1/10 today  Feeling better overall  Objective:  Completed Hot pack for warm up to elbow for ~5 minutes  Complete manual therapy with myofascial soft tissue work and Graston techniques to improve ROM and facilitate healing  Performed to forearm flexors, extensors and over epicondyle to relax trigger points and tight musculature  Upgraded HEP with wrist weights with 3lb  Instructed in theraband ex for arm See handout  Gave red band  Home Program:See Media Section for details  Epicondylitis stretches  Wrist weights 3lb  Theraband Ex red band  Precautions: Lateral Epicondylitis     Exercise Diary  11/25/19 11/27/19 12/3     Wrist maze x5 x5 x5     Wrist weights 1lb x20  Flex  Ext  Radial Dev   1lb x20  Flex  Ext  Radial Dev  Ulnar Dev 3lb x20  Flex  Ext  Radial Dev  Ulnar Dev     Pron/sup With Hammer x 20 With Hammer x 20 With powerbar x 20     threaband ex   For HEP                                                                                                                                         Assessment: Patient tolerated session well  Pain decreasing nicely  Able to upgrade to exercises today  Plan: Continue Occupational Therapy ~2x/week to decrease pain and edema and improve strength and function

## 2019-12-06 ENCOUNTER — OFFICE VISIT (OUTPATIENT)
Dept: OCCUPATIONAL THERAPY | Facility: CLINIC | Age: 59
End: 2019-12-06
Payer: COMMERCIAL

## 2019-12-06 DIAGNOSIS — M77.12 LATERAL EPICONDYLITIS OF LEFT ELBOW: Primary | ICD-10-CM

## 2019-12-06 PROCEDURE — 97140 MANUAL THERAPY 1/> REGIONS: CPT | Performed by: OCCUPATIONAL THERAPIST

## 2019-12-06 NOTE — PROGRESS NOTES
Daily Note     Today's date: 2019  Patient name: Soraya Vance  : 1960  MRN: [id number]  Referring provider: Isrrael Delaney MD  Dx:   Encounter Diagnosis     ICD-10-CM    1  Lateral epicondylitis of left elbow M77 12        Start Time: 845  Stop Time: 915  Total time in clinic (min): 30 minutes    Subjective: Patient reports pain level as 1 5/10 today  Feeling more achy  Lifted a generator and aggravated it  Objective:  Completed Hot pack for warm up to elbow for ~5 minutes  Complete manual therapy with myofascial soft tissue work and Graston techniques to improve ROM and facilitate healing  Performed to forearm flexors, extensors and over epicondyle to relax trigger points and tight musculature  Took it easy today from exercises because he overdid yesterday  Home Program:See Media Section for details  Epicondylitis stretches  Wrist weights 3lb  Theraband Ex red band  Precautions: Lateral Epicondylitis     Exercise Diary  11/25/19 11/27/19 12/3     Wrist maze x5 x5 x5     Wrist weights 1lb x20  Flex  Ext  Radial Dev   1lb x20  Flex  Ext  Radial Dev  Ulnar Dev 3lb x20  Flex  Ext  Radial Dev  Ulnar Dev     Pron/sup With Hammer x 20 With Hammer x 20 With powerbar x 20     threaband ex   For HEP                                                                                                                                         Assessment: Patient tolerated session well  He will take it easy for the weekend, return to exercise as his pain is down again  Plan: Continue Occupational Therapy ~2x/week to decrease pain and edema and improve strength and function

## 2019-12-10 ENCOUNTER — OFFICE VISIT (OUTPATIENT)
Dept: OCCUPATIONAL THERAPY | Facility: CLINIC | Age: 59
End: 2019-12-10
Payer: COMMERCIAL

## 2019-12-10 DIAGNOSIS — M77.12 LATERAL EPICONDYLITIS OF LEFT ELBOW: Primary | ICD-10-CM

## 2019-12-10 PROCEDURE — 97140 MANUAL THERAPY 1/> REGIONS: CPT | Performed by: OCCUPATIONAL THERAPIST

## 2019-12-10 PROCEDURE — 97110 THERAPEUTIC EXERCISES: CPT | Performed by: OCCUPATIONAL THERAPIST

## 2019-12-10 NOTE — PROGRESS NOTES
Daily Note     Today's date: 12/10/2019  Patient name: Douglas Oliver  : 1960  MRN: [id number]  Referring provider: Yari Gordon MD  Dx:   Encounter Diagnosis     ICD-10-CM    1  Lateral epicondylitis of left elbow M77 12        Start Time: 845  Stop Time: 930  Total time in clinic (min): 45 minutes    Subjective: Patient reports pain level as 0-1/10 today  Feeling better from last week  Objective:  Completed Hot pack for warm up to elbow for ~5 minutes  Complete manual therapy with myofascial soft tissue work and Graston techniques to improve ROM and facilitate healing  Performed to forearm flexors, extensors and over epicondyle to relax trigger points and tight musculature  Completed exercises and activities to increase ROM, strength, coordination and function  Home Program:See Media Section for details  Epicondylitis stretches  Wrist weights 3lb  Theraband Ex red band  Precautions: Lateral Epicondylitis     Exercise Diary  11/25/19 11/27/19 12/3 12/10    Wrist maze x5 x5 x5     Wrist weights 1lb x20  Flex  Ext  Radial Dev   1lb x20  Flex  Ext  Radial Dev  Ulnar Dev 3lb x20  Flex  Ext  Radial Dev  Ulnar Dev 3lb x20  Flex  Ext  Radial Dev  Ulnar Dev    Pron/sup With Hammer x 20 With Hammer x 20 With powerbar x 20 With powerbar x 20    threaband ex   For HEP     grippers    3Y x 20    OT pulleys    Triceps (3)  IR(3)  Protraction(3)  ER(3)  Biceps (2)                                                                                                                        Assessment: Patient tolerated session well  Able to upgrade exercises  Plan: Continue Occupational Therapy ~2x/week to decrease pain and edema and improve strength and function

## 2019-12-12 ENCOUNTER — OFFICE VISIT (OUTPATIENT)
Dept: OCCUPATIONAL THERAPY | Facility: CLINIC | Age: 59
End: 2019-12-12
Payer: COMMERCIAL

## 2019-12-12 DIAGNOSIS — M77.12 LATERAL EPICONDYLITIS OF LEFT ELBOW: Primary | ICD-10-CM

## 2019-12-12 PROCEDURE — 97140 MANUAL THERAPY 1/> REGIONS: CPT | Performed by: OCCUPATIONAL THERAPIST

## 2019-12-12 PROCEDURE — 97110 THERAPEUTIC EXERCISES: CPT | Performed by: OCCUPATIONAL THERAPIST

## 2019-12-12 NOTE — PROGRESS NOTES
Daily Note     Today's date: 2019  Patient name: Douglas Oliver  : 1960  MRN: [id number]  Referring provider: Yari Gordon MD  Dx:   Encounter Diagnosis     ICD-10-CM    1  Lateral epicondylitis of left elbow M77 12        Start Time: 0800  Stop Time: 08  Total time in clinic (min): 35 minutes    Subjective: Patient reports pain level as 1-2/10 today  Irritated it yesterday putting up decorations  Objective:  Initiated treatment with Ultrasound at 3 3MHz , 100%, 1 0w/cm2, 5cm2 head, x 4min to lateral epicondyle  Completed Hot pack for warm up to elbow for ~5 minutes  Complete manual therapy with myofascial soft tissue work and Graston techniques to improve ROM and facilitate healing  Performed to forearm flexors, extensors and over epicondyle to relax trigger points and tight musculature  Completed exercises and activities to increase ROM, strength, coordination and function  See chart below  Home Program:See Media Section for details  Epicondylitis stretches  Wrist weights 3lb  Theraband Ex red band  Precautions: Lateral Epicondylitis     Exercise Diary  11/25/19 11/27/19 12/3 12/10 12/12   Wrist maze x5 x5 x5     Wrist weights 1lb x20  Flex  Ext  Radial Dev   1lb x20  Flex  Ext  Radial Dev  Ulnar Dev 3lb x20  Flex  Ext  Radial Dev  Ulnar Dev 3lb x20  Flex  Ext  Radial Dev  Ulnar Dev 3lb x20  Flex  Ext  Radial Dev  Ulnar Dev   Pron/sup With Hammer x 20 With Hammer x 20 With powerbar x 20 With powerbar x 20 With powerbar x 20   threaband ex   For HEP     grippers    3Y x 20 3Y x20   OT pulleys    Triceps (3)  IR(3)  Protraction(3)  ER(3)  Biceps (2)                                                                                                                        Assessment: Patient tolerated session well  Took it easy with exercises today  Plan: Continue Occupational Therapy ~2x/week to decrease pain and edema and improve strength and function

## 2019-12-17 ENCOUNTER — OFFICE VISIT (OUTPATIENT)
Dept: OCCUPATIONAL THERAPY | Facility: CLINIC | Age: 59
End: 2019-12-17
Payer: COMMERCIAL

## 2019-12-17 DIAGNOSIS — M77.12 LATERAL EPICONDYLITIS OF LEFT ELBOW: Primary | ICD-10-CM

## 2019-12-17 PROCEDURE — 97140 MANUAL THERAPY 1/> REGIONS: CPT | Performed by: OCCUPATIONAL THERAPIST

## 2019-12-17 PROCEDURE — 97110 THERAPEUTIC EXERCISES: CPT | Performed by: OCCUPATIONAL THERAPIST

## 2019-12-17 NOTE — PROGRESS NOTES
Daily Note     Today's date: 2019  Patient name: Day Pardo  : 1960  MRN: [id number]  Referring provider: Luther Rich MD  Dx:   Encounter Diagnosis     ICD-10-CM    1  Lateral epicondylitis of left elbow M77 12        Start Time: 845  Stop Time: 930  Total time in clinic (min): 45 minutes    Subjective: Patient reports pain level as 1-2/10 today  Irritated it yesterday putting up decorations  Objective:  Initiated treatment with Ultrasound at 3 3MHz , 100%, 1 0w/cm2, 5cm2 head, x 4min to lateral epicondyle  Completed Hot pack for warm up to elbow for ~5 minutes  Complete manual therapy with myofascial soft tissue work and Graston techniques to improve ROM and facilitate healing  Performed to forearm flexors, extensors and over epicondyle to relax trigger points and tight musculature  Completed exercises and activities to increase ROM, strength, coordination and function  See chart below  Home Program:See Media Section for details  Epicondylitis stretches  Wrist weights 3lb  Theraband Ex red band  Precautions: Lateral Epicondylitis     Exercise Diary  12/17/19 11/27/19 12/3 12/10 12/12   Wrist maze x5 x5 x5     Wrist weights 3lb x20  Flex  Ext  Radial Dev   1lb x20  Flex  Ext  Radial Dev  Ulnar Dev 3lb x20  Flex  Ext  Radial Dev  Ulnar Dev 3lb x20  Flex  Ext  Radial Dev  Ulnar Dev 3lb x20  Flex  Ext  Radial Dev  Ulnar Dev   Pron/sup With powerbar x 20 With Hammer x 20 With powerbar x 20 With powerbar x 20 With powerbar x 20   threaband ex   For HEP     grippers    3Y x 20 3Y x20   OT pulleys Triceps (3)  IR(3)  Protraction(3)  ER(3)  Biceps (2)   Triceps (3)  IR(3)  Protraction(3)  ER(3)  Biceps (2)                                                                                                                        Assessment: Patient tolerated session well  Doing well with exercises - was a little sore ? Weather        Plan: Continue Occupational Therapy ~2x/week to decrease pain and edema and improve strength and function

## 2019-12-19 ENCOUNTER — OFFICE VISIT (OUTPATIENT)
Dept: OCCUPATIONAL THERAPY | Facility: CLINIC | Age: 59
End: 2019-12-19
Payer: COMMERCIAL

## 2019-12-19 DIAGNOSIS — M77.12 LATERAL EPICONDYLITIS OF LEFT ELBOW: Primary | ICD-10-CM

## 2019-12-19 PROCEDURE — 97110 THERAPEUTIC EXERCISES: CPT | Performed by: OCCUPATIONAL THERAPIST

## 2019-12-19 PROCEDURE — 97140 MANUAL THERAPY 1/> REGIONS: CPT | Performed by: OCCUPATIONAL THERAPIST

## 2019-12-19 NOTE — PROGRESS NOTES
OT Discharge     Today's date: 2019  Patient name: Cynthia Roman  : 1960  MRN: [id number]  Referring provider: Shorty Rod MD  Dx:   Encounter Diagnosis     ICD-10-CM    1  Lateral epicondylitis of left elbow M77 12        Start Time: 845  Stop Time: 930  Total time in clinic (min): 45 minutes      CASE SUMMARY:   Cynthia Roman is a 61y o  year old male who has been experiencing left ebow pain since 2019 when he picked up 2 bags of ice at a picinic  Patient also reports picking up heavy instruments in the past that resulted in similar pain  He is left hand dominant  PMHx includes: See chart for full details with medications  Jong Fernandez lives with his partner  Some of his interests include: playing musical instruments, baking and decorating  He is retired  The following is a summary of his status  PROGRESS UPDATE:   Cynthia Roman was initially seen in our department on 19 and has since been seen ~2x/week  Treatment includes ultrasound, moist heat, manual therapy with myofacial soft tissue work and Graston techniques, therapeutic exercises to increase strength  Jong Fernandez is showing good improvement in ROM and strength  Pain level is minimal  He is able to do more ADLs  At this point he feels comfortable with discharge and continuing on his own with his home program      EDEMA: Jong Fernandez presents with minimal to no elbow edema  At elbow crease:  R= 29 cm  L=29 5 cm    ROM: Jong Fernandez presents with good general ROM in the elbow and wrist  He has less tightness in the forearm extensors now  Elbow Extension= 0  Supination=80  Elbow Flexion   =140  Pronation  =85    Wrist Flexion: 60  Wrist Extension: 65    STRENGTH: Leo presents with good wrist and hand strength  Wrist Extension:  4+-5/5  Wrist Flexion:      4+-5/5  Pain with resisted extension        Strength: R= 94  L= 85 lbsF  Lateral Pinch: R= 20  L= 21 5 lbsF  3 Point Pinch: R= 18  L= 18 lbsF  2 Point Pinch: R= 16  L= 16 lbsF       PAIN LEVEL: Current: 0 5/10 At Best:0/10   At worst: 2/10   Location:  Lateral elbow,  dull ache  SENSATION:  Jong Fernandez reports no sensory problems  No numbness reported  FUNCTIONAL SUMMARY:   Cynthia Roman is independent in basic self care skills  He has mild to moderate difficulty with lifting, cooking, and cleaning tasks  FOTO score is 67% with a 72% prediction in function  IMPAIRMENT SUMMARY:  Cynthia Roman presents with:  Minimal to no elbow edema,  Good general elbow ROM,  Good wrist and hand strength,  Minimal pain at 0-2/10  Intact sensation  Returned to doing most ADLs  Patient would benefit from OT to return to prior function  Prognosis: Good  Short Term Goals: to be completed in 6-12weeks  1  Increase wrist strength to 5/5  Met  2  Decrease pain to 0-3/10  Met    Long Term Goals: To be completed in 8-12 weeks  1  Ability to perform lifting, carrying, cooking,cleaning tasks and work tasks with minimal to no discomfort,  Met  2  Patient demonstrates good carryover of HEP, Met  3  Minimal to no pain complaints  0-2/10, Met  4  Improved Left elbow strength to 5/5  Met    Patient Goals: Return to playing string instruments/different positions  Biggest concern is reducing pain  TREATMENT TODAY:   Subjective: Patient reports pain level as 0- 0 5/10 today  Objective:  Completed Hot pack for warm up to elbow for ~5 minutes  Complete manual therapy with myofascial soft tissue work and Graston techniques to improve ROM and facilitate healing  Performed to forearm flexors, extensors and over epicondyle to relax trigger points and tight musculature  Completed exercises and activities to increase ROM, strength, coordination and function  See chart below  Completed re-eval for DC  Reviewed HEP and Upgraded to green theraband which he has at home  Can upgrade to 4lb wrist weights in about 2 weeks  Patient to continue HEP until end of January    Gave green grippers to start doing with Hep 2 tan bands to start  Home Program:See Media Section for details  Epicondylitis stretches  Wrist weights 3lb  Theraband Ex red band  Upgraded to green  Green grippers 2 tan bands  Precautions: Lateral Epicondylitis     Exercise Diary  12/17/19 12/19/19 12/3 12/10 12/12   Wrist maze x5 x5 x5     Wrist weights 3lb x20  Flex  Ext  Radial Dev   3lb x20  Flex  Ext  Radial Dev  Ulnar Dev 3lb x20  Flex  Ext  Radial Dev  Ulnar Dev 3lb x20  Flex  Ext  Radial Dev  Ulnar Dev 3lb x20  Flex  Ext  Radial Dev  Ulnar Dev   Pron/sup With powerbar x 20 With Powerbar x 20 With powerbar x 20 With powerbar x 20 With powerbar x 20   threaband ex   For HEP     grippers  For HEP x 20  3Y x 20 3Y x20   OT pulleys Triceps (3)  IR(3)  Protraction(3)  ER(3)  Biceps (2)   Triceps (3)  IR(3)  Protraction(3)  ER(3)  Biceps (2)                                                                                                                        Assessment: Patient tolerated session well  Doing well with exercises - Overall feels comfortable with discharge and continuing on his own with his home program      Plan: Discharge OT

## 2019-12-23 ENCOUNTER — OFFICE VISIT (OUTPATIENT)
Dept: FAMILY MEDICINE CLINIC | Facility: CLINIC | Age: 59
End: 2019-12-23
Payer: COMMERCIAL

## 2019-12-23 VITALS
BODY MASS INDEX: 29.39 KG/M2 | WEIGHT: 229 LBS | HEIGHT: 74 IN | OXYGEN SATURATION: 97 % | SYSTOLIC BLOOD PRESSURE: 132 MMHG | DIASTOLIC BLOOD PRESSURE: 96 MMHG | HEART RATE: 70 BPM | RESPIRATION RATE: 18 BRPM | TEMPERATURE: 97.9 F

## 2019-12-23 DIAGNOSIS — R03.0 ELEVATED BLOOD PRESSURE READING: ICD-10-CM

## 2019-12-23 DIAGNOSIS — Z11.59 NEED FOR HEPATITIS C SCREENING TEST: ICD-10-CM

## 2019-12-23 DIAGNOSIS — Z11.4 SCREENING FOR HIV (HUMAN IMMUNODEFICIENCY VIRUS): ICD-10-CM

## 2019-12-23 DIAGNOSIS — Z00.00 ENCOUNTER FOR WELLNESS EXAMINATION IN ADULT: Primary | ICD-10-CM

## 2019-12-23 PROCEDURE — 99396 PREV VISIT EST AGE 40-64: CPT | Performed by: FAMILY MEDICINE

## 2019-12-23 NOTE — PROGRESS NOTES
Chief concern and HPI: Nicola Sender is a 61 y o  male  Here for physical    No acute concerns today  Elbow significantly improved  Pepcid helpful for GERD but not as much as Protonix     Working on [employer]  Daily walk  Stress level ok    Previous relevant clinical information reviewed from medical, surgical and psychosocial history, medication and allergies and labs/studies  Patient Active Problem List   Diagnosis   (none) - all problems resolved or deleted         Review of systems: No new or worsening:   Unexplained fever/chills/sweats/weight loss  HEENT issues such as new ear, mouth, nose or eye problems  Respiratory issues such as new shortness of breath, cough  Heart issues such as new chest pain or pressure, palpitations  Abdominal or digestive issues such as diarrhea, constipation or blood in stool  BM's are normal  Genitourinary issues  Neurological issues such as new numbness or motor weakness  Psychological issues such as depression or anxiety  Skin issues such as new rashes      PHQ-9 Depression Screening    PHQ-9:    Frequency of the following problems over the past two weeks:                   Exam: 128/92 on repeat R arm  Alert, no acute distress /96 (BP Location: Right arm, Patient Position: Sitting)   Pulse 70   Temp 97 9 °F (36 6 °C) (Tympanic)   Resp 18   Ht 6' 2" (1 88 m)   Wt 104 kg (229 lb)   SpO2 97%   BMI 29 40 kg/m²   HEENT: Head normocephalic and atraumatic  Lungs: No respiratory labor  Clear to auscultation  Cardiac: Regular rate and rhythm  No murmur, rub or gallop  Abdomen: Benign  Normal active bowel sounds  Soft and non-tender  No organomegaly  Extremities: No cyanosis, clubbing or edema  Neuro screen: No gross deficits  Rectal: prostate mild enlarged, c/w age, no nodules  NO visible blood in stool  Genitalia wnl  Circumcised  NO hernia  Some axillary skin tags      Summary Impression:  1   Encounter for wellness examination in adult  Goal is increase zbigniew  Reviewed recent labs, which are basically ok    2  Elevated blood pressure reading  Check at least weekly and send me log, then I will decide next step    Declines Tdap at this time            Risks and benefits of therapeutic plan discussed, answered all patient questions and concerns and patient expressed understanding and agreement of therapeutic plan  BMI Counseling: Body mass index is 29 4 kg/m²  Discussed the patient's BMI with him  The BMI is above normal  Nutrition recommendations include 3-5 servings of fruits/vegetables daily  Social History     Tobacco Use   Smoking Status Never Smoker   Smokeless Tobacco Never Used          Follow up plan: as per BP readings   - send me MyChart log    F/u 3 months

## 2019-12-23 NOTE — PATIENT INSTRUCTIONS
GERD (Gastroesophageal Reflux Disease) is caused by reflux of stomach acid into the esophagus (food tube)  It is a common condition and often occurs with general indigestion  Common causes include the Western lifestyle, especially the following:   Eating a Western diet full of processed high calorie to nutrient ratio food  About 80% of adults have an inadequate diet, so this is a foundational change required for most patients suffering with GERD  Eating quickly in stressful environments ('gobble, gulp & go')  Obesity  High stress, poor posture  Certain medicines and substances can aggravate GERD      How to treat GERD: Most GERD symptoms such as heartburn, regurgitation, and hoarseness can be adequately treated with the following:    Healthy eating:  Eat a plant based, whole foods diet  Avoid processed food, including any food product made from typical commercial flour products (e g  Commercial breads, pastries)  Take a probiotic containing food daily, e g  Kefir, yogurt    If overweight, losing weight often improves symptoms    Avoid any food that aggravates your symptoms  These commonly include: coffee, smoking, alcohol, dairy, citrus (e g  orange juice), tomatoes, spicy foods or mint    Elevate head of the bed at night about 6 inches by putting blocks under wear the bed posts at the head of the bed meet the floor  Avoid eating within 2 hours of bedtime    Eat slowly and thoroughly chew your food - saliva neutralizes the effects of stomach acid on irritating the esophagus  Chewing gum after a meal enhances saliva production  Consider a trial of the elimination diet if you suspect a food allergy/intolerance        Musculoskeletal aspects of care:  Good posture is important to decrease tendency to reflux  Don't hunch over  Breath in a relaxed manner deep in the belly, rather than high in the chest  Somatics materials: [email or web address] html: Somatic Exercises[TM] narrated by [place], Ethan D : Somatic Exercises[TM ]for Healthy Breathing (CDs)    Avoid tight binders around the abdomen (e g  Corsets in women), which will increase abdominal pressure and therefore reflux  Sleep on your left side (this prevents stomach contents from refluxing into the esophagus)    Re-train the muscles involved in swallowing as well as strengthen the diaphragm - key in reducing any hiatal hernia  Abnormal rhythm (peristalsis) of the muscles in the food tube occur in about 1/2 of GERD patients  Can use a commercial device such as Vocalyticsoro (IQoro com)  Expect it to take several weeks or months for improvement  This may also help snoring and obstructive sleep apnea  The device does have some scientific evidence supporting its use, but all by one researcher with commercial connection to the product  It is fairly expensive, and it is unlikely that insurance companies will cover it  Supplements     Consider Slippery elm after a meal - can get tabs (e g  Kyler's slippery elm lozenges) or powder - mix with a little water to make a gruel (can add cinnamon for taste) and take after meals     Consider Marshmallow - use similarly to slippery elm     Consider Deglycyrrhizinated Licorice (DGL) [id number] chewable tabs before meals x 4-6 weeks    Conventional Medicines  Alginate/antacid combos can keep acid away from the lower esophagus  A good brand is Gaviscon Double Action liquid, taken after meals  Simple over the counter (OTC) antacids such as Calcium Carbonate (e g  Tums) may suffice     H2 blockers decrease acid in the stomach (e g  Ranitidine - Zantac, Famotidine - Pepcid, and others)    Proton Pump blockers (PPIs) are stronger acid-suppressors than H2 blockers  Examples are Omeprazole, Pantoprazole, Esomeprazole - Nexium, Rabeprazole - Aciphex and others)   They may be needed for a short time to get control of symptoms and are approved for up to 8 weeks for GERD (up to 16 weeks or even longer for more severe symptoms)  Because of numerous concerns about possible long term side effects, such as magnesium deficiency, increase of some fractures, pneumonia and opportunistic GI infections such as Clostridia difficile, the risk/benefit ratio of long term use should be carefully evaluated  If needed, your health care provider can guide you how to taper off these medicines slowly (do not stop abruptly, because you might get rebound symptoms)

## 2020-07-13 NOTE — TELEPHONE ENCOUNTER
Addended byAsa Rater on: 7/13/2020 02:04 PM     Modules accepted: Orders Please advise  incoming fax from [facility]  Requesting refills for Pantopazole 40mg bid  with a Quantity of 60

## 2020-08-25 ENCOUNTER — TELEPHONE (OUTPATIENT)
Dept: GASTROENTEROLOGY | Facility: AMBULARY SURGERY CENTER | Age: 60
End: 2020-08-25

## 2020-08-25 DIAGNOSIS — K21.00 GASTROESOPHAGEAL REFLUX DISEASE WITH ESOPHAGITIS: ICD-10-CM

## 2020-08-25 RX ORDER — FAMOTIDINE 20 MG/1
20 TABLET, FILM COATED ORAL DAILY PRN
Qty: 30 TABLET | Refills: 5 | Status: SHIPPED | OUTPATIENT
Start: 2020-08-25

## 2020-08-25 RX ORDER — FAMOTIDINE 20 MG/1
20 TABLET, FILM COATED ORAL DAILY PRN
Qty: 30 TABLET | Refills: 1 | Status: CANCELLED | OUTPATIENT
Start: 2020-08-25

## 2020-08-25 NOTE — TELEPHONE ENCOUNTER
Refill sent to provider for approval    Clerical: Patient has not been seen since 9/25/2019, please reach out to him to schedule follow up since it's been almost a year

## 2020-08-25 NOTE — TELEPHONE ENCOUNTER
GI Physician: Dr Marianne Aguirre for Medication: FAMOTIDINE    Dose: 20 MG    Quantity: 30 TABS    Pharmacy and Location: [address] [place], [place]

## 2021-04-13 DIAGNOSIS — Z23 ENCOUNTER FOR IMMUNIZATION: ICD-10-CM

## 2023-01-31 ENCOUNTER — TELEPHONE (OUTPATIENT)
Dept: FAMILY MEDICINE CLINIC | Facility: CLINIC | Age: 63
End: 2023-01-31

## 2023-01-31 NOTE — TELEPHONE ENCOUNTER
Patient Attribution    Left voice message to call the office  Since 2021 dr Zoila Robertson left message  In 2021 never responded, left a message asked if he will still been our patient or we will take out of the list   If he doesn't call and let us know      1st attempt

## 2023-02-03 NOTE — TELEPHONE ENCOUNTER
Patient called back and stated that he still is with [place] and he will call back to schedule appointment

## 2023-02-21 ENCOUNTER — TELEPHONE (OUTPATIENT)
Dept: GASTROENTEROLOGY | Facility: CLINIC | Age: 63
End: 2023-02-21

## 2023-04-25 PROBLEM — S49.92XA SHOULDER INJURY, LEFT, INITIAL ENCOUNTER: Status: ACTIVE | Noted: 2023-02-11

## 2023-04-25 NOTE — PROGRESS NOTES
[address] [facility]    NAME: Giancarlo Tyler  AGE: 61 y o  SEX: male  : 1960     DATE: 2023     Assessment and Plan:   1  Need for hepatitis C screening test    - Hepatitis C Antibody; Future  - Hepatitis C Antibody    2  Screening for HIV (human immunodeficiency virus)    - HIV 1/2 AG/AB w Reflex [facility] for 2 yr old and above; Future    3  Encounter for immunization  Updated records    4  Primary hypertension  He is to check a blood pressure diary over the next 3 to 5 days and MyChart that with me  Based on that data will decide whether to start medication  Also given information on DASH diet  - Blood Pressure Monitoring (B-D ASSURE BPM/AUTO ARM CUFF) MISC; Check BP regularly  Dispense: 1 each; Refill: 0  - CBC and differential; Future  - Comprehensive metabolic panel; Future  - UA (URINE) with reflex to Scope  - CBC and differential  - Comprehensive metabolic panel    5  Gastroesophageal reflux disease without esophagitis  Stable with Pepcid  Encouraged healthy lifestyle    6  Shoulder injury, left, initial encounter  On basic exam consistent with impingement syndrome  - Ambulatory Referral to Physical Therapy; Future    7  Grade II hemorrhoids  Healthy eating and bowel habits  Can use over-the-counter hemorrhoid creams for now  8  Impingement syndrome of left shoulder    - Ambulatory Referral to Physical Therapy;  Future    Problem List Items Addressed This Visit        Digestive    Gastroesophageal reflux disease without esophagitis    Hemorrhoids       Cardiovascular and Mediastinum    Primary hypertension - Primary    Relevant Medications    Blood Pressure Monitoring (B-D ASSURE BPM/AUTO ARM CUFF) MISC    Other Relevant Orders    CBC and differential    Comprehensive metabolic panel    UA (URINE) with reflex to Scope       Other    Shoulder injury, left, initial encounter    Relevant Orders    Ambulatory Referral to Physical Therapy   Other Visit Diagnoses     Need for hepatitis C screening test        Relevant Orders    Hepatitis C Antibody    Screening for HIV (human immunodeficiency virus)        Relevant Orders    HIV 1/2 AG/AB w Reflex [facility] for 2 yr old and above    Encounter for immunization        Impingement syndrome of left shoulder        Relevant Orders    Ambulatory Referral to Physical Therapy          Immunizations and preventive care screenings were discussed with patient today  Appropriate education was printed on patient's after visit summary  Counseling:  Alcohol/drug use: discussed moderation in alcohol intake, the recommendations for healthy alcohol use, and avoidance of illicit drug use  Dental Health: discussed importance of regular tooth brushing, flossing, and dental visits  Injury prevention: discussed safety/seat belts, safety helmets, smoke detectors, carbon dioxide detectors, and smoking near bedding or upholstery  Sexual health: discussed sexually transmitted diseases, partner selection, use of condoms, avoidance of unintended pregnancy, and contraceptive alternatives  Exercise: the importance of regular exercise/physical activity was discussed  Recommend exercise 3-5 times per week for at least 30 minutes  Return in about 4 weeks (around 5/24/2023) for Chronic dis  f/u 1/2 hr      Chief Complaint:     Chief Complaint   Patient presents with   • Establish Care      History of Present Illness:     Adult Annual Physical   Patient here for a comprehensive physical exam  The patient reports problems - Left shoulder bothers him since he lifted a heavy box a few weeks ago       Diet and Physical Activity  Diet/Nutrition: Fair diet  Exercise: walking        Depression Screening  PHQ-2/9 Depression Screening    Little interest or pleasure in doing things: 1 - several days  Feeling down, depressed, or hopeless: 0 - not at all  Trouble falling or staying asleep, or sleeping too much: 1 - several days  Feeling tired or having little energy: 1 - several days  Poor appetite or overeatin - not at all  Feeling bad about yourself - or that you are a failure or have let yourself or your family down: 0 - not at all  Trouble concentrating on things, such as reading the newspaper or watching television: 0 - not at all  Moving or speaking so slowly that other people could have noticed  Or the opposite - being so fidgety or restless that you have been moving around a lot more than usual: 0 - not at all  Thoughts that you would be better off dead, or of hurting yourself in some way: 0 - not at all  PHQ-2 Score: 1  PHQ-2 Interpretation: Negative depression screen  PHQ-9 Score: 3   PHQ-9 Interpretation: No or Minimal depression        General Health  Sleep: sleeps well  Hearing: normal - bilateral   Vision: wears glasses  Dental: brushes teeth once daily          Health  Symptoms include: none     Review of Systems:     Review of Systems   Constitutional: No fever, chills or sweats  HEENT: No eye or ear symptoms, sore throat, congested nose  Cardiorespiratory: No chest pain or shortness of breath  Abdomen/Gastrointestinal: No abdominal pain or unusual change digestion or in bowel movements  Genitourinary: No change in urination  Neuromuscular: No new neurological symptoms   Psych: No suicidal ideation     Past Medical History:     Past Medical History:   Diagnosis Date   • Bradycardia 2019   • Calculus of gallbladder and bile duct with obstruction without cholecystitis    • Gallstone pancreatitis 04/10/2019    Added automatically from request for surgery [id number]   • GERD (gastroesophageal reflux disease)    • Hematemesis 2019   • Hypertension    • Pancreatitis     blocked by gall stones   • Wears glasses       Past Surgical History:     Past Surgical History:   Procedure Laterality Date   • CHOLECYSTECTOMY      Lap   • CHOLECYSTECTOMY LAPAROSCOPIC N/A 4/15/2019    Procedure: CHOLECYSTECTOMY LAPAROSCOPIC;  Surgeon: Merly Medellin MD;  Location: [address] [place] [address];  Service: General   • EGD     • ERCP N/A 4/15/2019    Procedure: ENDOSCOPIC RETROGRADE CHOLANGIOPANCREATOGRAPHY (ERCP);   Surgeon: Merly Medellin MD;  Location: [address] [place] [address];  Service: General   • WISDOM TOOTH EXTRACTION      x4      Family History:     Family History   Problem Relation Age of Onset   • No Known Problems Mother    • No Known Problems Father    • No Known Problems Brother    • No Known Problems Brother       Social History:     Social History     Socioeconomic History   • Marital status: Single     Spouse name: None   • Number of children: None   • Years of education: None   • Highest education level: None   Occupational History   • None   Tobacco Use   • Smoking status: Never   • Smokeless tobacco: Never   Substance and Sexual Activity   • Alcohol use: Yes     Comment: Rarely, every 3-4 months   • Drug use: Not Currently   • Sexual activity: Not Currently     Partners: Male   Other Topics Concern   • None   Social History Narrative   • None     Social Determinants of Health     Financial Resource Strain: Not on file   Food Insecurity: Not on file   Transportation Needs: Not on file   Physical Activity: Not on file   Stress: Not on file   Social Connections: Not on file   Intimate Partner Violence: Not on file   Housing Stability: Not on file      Current Medications:     Current Outpatient Medications   Medication Sig Dispense Refill   • Blood Pressure Monitoring (B-D ASSURE BPM/AUTO ARM CUFF) MISC Check BP regularly 1 each 0   • emtricitabine-tenofovir (TRUVADA) 200-300 mg per tablet      • tadalafil (CIALIS) 5 MG tablet      • ascorbic acid (VITAMIN C) 1000 MG tablet Take 1,000 mg by mouth as needed      • B COMPLEX-BIOTIN-FA PO Take by mouth 3 (three) times a week      • famotidine (PEPCID) 20 mg tablet Take 1 tablet (20 mg total) by mouth daily as needed for indigestion or heartburn 30 tablet 5   • multivitamin (THERAGRAN) TABS Take 1 tablet by mouth 2 (two) times a week      • Potassium Bicarbonate 99 MG CAPS Take by mouth as needed      • Probiotic Product (PROBIOTIC ADVANCED) CAPS Take by mouth as needed        No current facility-administered medications for this visit  Allergies: Allergies   Allergen Reactions   • Azithromycin Diarrhea      Physical Exam:     BP (!) 175/94   Pulse 72   Resp 16   Wt 106 kg (234 lb)   SpO2 97%   BMI 30 04 kg/m²     Physical Exam   Constitutional: No fever, chills or sweats  HEENT: No eye or ear symptoms, sore throat, congested nose  Cardiorespiratory: No chest pain or shortness of breath  Abdomen/Gastrointestinal: No abdominal pain or unusual change digestion or in bowel movements  As hemorrhoidal skin tag at 12:00  Anoscopy shows some mild internal hemorrhoids that are not actively bleeding  No concerning masses or other findings found  Genitourinary: No change in urination  Neuromuscular: No new neurological symptoms or unexplained/new achy joints or muscles  Psych: No suicidal ideation  Left shoulder exam shows painful arc on elevation at about 90 degrees with positive Wheeler sign  Basic isometric muscle testing is negative  No distal neurovascular complaints or basic findings        Megan Hanley MD  [address]

## 2023-04-26 ENCOUNTER — OFFICE VISIT (OUTPATIENT)
Dept: FAMILY MEDICINE CLINIC | Facility: CLINIC | Age: 63
End: 2023-04-26

## 2023-04-26 VITALS
SYSTOLIC BLOOD PRESSURE: 175 MMHG | OXYGEN SATURATION: 97 % | WEIGHT: 234 LBS | DIASTOLIC BLOOD PRESSURE: 94 MMHG | BODY MASS INDEX: 30.04 KG/M2 | HEART RATE: 72 BPM | RESPIRATION RATE: 16 BRPM

## 2023-04-26 DIAGNOSIS — S49.92XA SHOULDER INJURY, LEFT, INITIAL ENCOUNTER: ICD-10-CM

## 2023-04-26 DIAGNOSIS — Z11.4 SCREENING FOR HIV (HUMAN IMMUNODEFICIENCY VIRUS): ICD-10-CM

## 2023-04-26 DIAGNOSIS — I10 PRIMARY HYPERTENSION: Primary | ICD-10-CM

## 2023-04-26 DIAGNOSIS — M75.42 IMPINGEMENT SYNDROME OF LEFT SHOULDER: ICD-10-CM

## 2023-04-26 DIAGNOSIS — K21.9 GASTROESOPHAGEAL REFLUX DISEASE WITHOUT ESOPHAGITIS: ICD-10-CM

## 2023-04-26 DIAGNOSIS — Z23 ENCOUNTER FOR IMMUNIZATION: ICD-10-CM

## 2023-04-26 DIAGNOSIS — K64.1 GRADE II HEMORRHOIDS: ICD-10-CM

## 2023-04-26 DIAGNOSIS — Z11.59 NEED FOR HEPATITIS C SCREENING TEST: ICD-10-CM

## 2023-04-26 PROBLEM — Z79.899 ON PRE-EXPOSURE PROPHYLAXIS FOR HIV: Status: ACTIVE | Noted: 2023-04-26

## 2023-04-26 PROBLEM — K64.9 HEMORRHOIDS: Status: ACTIVE | Noted: 2023-04-26

## 2023-04-26 RX ORDER — TADALAFIL 5 MG/1
TABLET ORAL
COMMUNITY
Start: 2023-03-01

## 2023-04-26 RX ORDER — MEDICAL SUPPLY, MISCELLANEOUS
EACH MISCELLANEOUS
Qty: 1 EACH | Refills: 0 | Status: SHIPPED | OUTPATIENT
Start: 2023-04-26

## 2023-04-26 RX ORDER — EMTRICITABINE AND TENOFOVIR DISOPROXIL FUMARATE 200; 300 MG/1; MG/1
TABLET, FILM COATED ORAL
COMMUNITY
Start: 2023-04-18

## 2023-04-26 NOTE — PATIENT INSTRUCTIONS
DASH diet for HTN  Background: High blood pressure from Hypertension (HTN) is very common  It has been called the ‘silent killer’ because it usually has no symptoms (you don’t feel different), yet is a major cause of many diseases, such as heart attacks, strokes, kidney disease, etc    Because HTN usually has no symptoms, you may find  it challenging to stick with your treatment plan  So how are you going to know how you are doing if you can’t ‘feel’ HTN? The answer is to buy a BP cuff (semi-automatic type that goes around the arm is best) and become part of your health care team by regularly (e g  a few times a week) monitoring and keeping a log of your BP  Bring this to your doctor every time you visit them  Though there may be a family (genetic) tendency, HTN is largely caused by unhealthy lifestyle choices  The good news is that it can be treated naturally in most cases with sensible and doable healthy lifestyle choices  Sometimes doctors need to prescribe medications also  It is very important you take any medicines exactly as prescribed  Do not stop them unless directed by your doctor, even if you feel fine without them - remember, you can’t ‘feel’ HTN! Be sure to let your doctor know if you have any problems with your medications  Healthy lifestyle choices such as eating, moving, sleeping and handling stress right will greatly reduce or eliminate many cases of HTN  So let’s get started! This handout focuses on healthy eating  Be sure to pace yourself, making no more than one small change a week  Good idea to write down what you eat & drink for a few days and bring it to your next doctor’s visit so they help you stay on track          This low salt DASH diet plan has been proven to  lower BP, as well as be healthy in general: Free government site: NameProposal com br       Links to commercial DASH diet resources:     Reputable commercial site that offers support while following DASH diet plan, including meal plans, diet tracking tools, motivational tips and option to ask questions of an expert panel via email   Nominal fee charged for these services is well worth it: http://www moraes info/

## 2023-04-27 ENCOUNTER — EVALUATION (OUTPATIENT)
Dept: PHYSICAL THERAPY | Facility: CLINIC | Age: 63
End: 2023-04-27

## 2023-04-27 ENCOUNTER — APPOINTMENT (OUTPATIENT)
Dept: LAB | Facility: CLINIC | Age: 63
End: 2023-04-27

## 2023-04-27 DIAGNOSIS — M75.42 IMPINGEMENT SYNDROME OF LEFT SHOULDER: ICD-10-CM

## 2023-04-27 DIAGNOSIS — Z11.4 SCREENING FOR HIV (HUMAN IMMUNODEFICIENCY VIRUS): ICD-10-CM

## 2023-04-27 DIAGNOSIS — S49.92XA SHOULDER INJURY, LEFT, INITIAL ENCOUNTER: ICD-10-CM

## 2023-04-27 LAB
HIV 1+2 AB+HIV1 P24 AG SERPL QL IA: NORMAL
HIV 2 AB SERPL QL IA: NORMAL
HIV1 AB SERPL QL IA: NORMAL
HIV1 P24 AG SERPL QL IA: NORMAL

## 2023-04-27 NOTE — PROGRESS NOTES
PT Evaluation   Today's date: 2023  Patient name: Adrian Costa  : 1960  MRN: [id number]  Referring provider: Harriet Gillespie MD  Dx:   Encounter Diagnosis     ICD-10-CM    1  Shoulder injury, left, initial encounter  S49  92XA Ambulatory Referral to Physical Therapy     PT plan of care cert/re-cert      2  Impingement syndrome of left shoulder  M75 42 Ambulatory Referral to Physical Therapy     PT plan of care cert/re-cert          Assessment  Assessment details: Patient is a 61 y o  Male who presents with referring diagnosis of shoulder injury, left  Patient's greatest concern is worry over not knowing what's wrong and fear of not being able to keep active  Primary movement impairment diagnosis of repeated motions responder for extension at the shoulder resulting in pathoanatomical symptoms of motor cooridination impairments and ROM limitations  The aforementioned impairments have limited the patient's ability to lift overhead, reach behind  No further referral appears necessary at this time based upon examination results    Patient education performed during today's session included: prognosis and diagnosis, HEP and PT expectations    Primary movement impairments:  1) ROM restrictions  2) Motor coordination impairments    Etiological factors include: none        Impairments: Abnormal coordination, Abnormal muscle tone, Abnormal or restricted ROM, Activity intolerance, Impaired physical strength, Lacks appropriate HEP and Poor posture  Understanding of Dx/Px/POC: Excellent  Prognosis: Excellent   Positive prognostic factors: positive attitude toward recovery, acuity of symptoms, absence of peripheralization and absence of observed red flags   Negative prognostic factors: none    Patient verbalized understanding of POC  Please contact me if you have any questions or recommendations   Thank you for the referral and the opportunity to share in [facility] care  Plan  Plan details: Joint mobilizations, STM/IASTM, Strengthening, Motor coordination training, Muscle flexibility, Functional lifting and Posture education    Patient would benefit from: skilled physical therapy  Planned modality interventions: Cryotherapy  Planned therapy interventions: activity modification, joint mobilization, manual therapy, motor coordination training, neuromuscular re-education, patient education, postural training, self care, therapeutic activities, therapeutic exercise, home exercise program, balance, behavior modification and transfer training  Frequency: 2x/week  Duration in weeks: 8  Plan of Care beginning date: 4/27/2023  Plan of Care expiration date: 8 weeks - 6/22/2023  Treatment plan discussed with: Patient       Goals  Short Term Goals (4 weeks - 5/25/2023):    - Patient will be independent in basic HEP 2-3 weeks  - Patient will report >50% reduction in pain  - Patient will demonstrate >1/3 improvement in MMT grade as applicable  - Demonstrate consistent posture corrections without cueing during therapeutic exercise  - Patient will have full ROM    Long Term Goals (8 weeks - 6/22/2023):  - Patient will be independent in a comprehensive home exercise program  - Patient FOTO score will improve to 76/100  - Patient will self-report >80% improvement in function  - Patient will lift 25# overhead  - Patient will reach behind the seat in the front seat of the car     Provider reviewed insurance verification form with patient, verbalized understanding  Subjective    History of Present Illness  - Mechanism of injury: he put too much weight into the L arm while moving a heaving crate, difficultly reaching to the side and behind the back  No issue going to the front  This has been going on for about 2 months now, he hasn't noticed much improvement     - ambidextrous, throw w left   - Occupation: retired - outdoor work, gardening, video games      Pain  - Current pain ratin/10  - At best pain ratin/10  - At worst pain ratin/10  - Location: outside of the shoulder  - Aggravating factors: abduction, IR      Objective     Red Flag Screening  - age >47 years old      Postural Assessment  -Posture in Sitting: WNL  -Posture in Standing:  WNL  -Postural Correction: WNL    Sensation  - Light touch: intact  - Reflexes:   Left: C5: 1+    Right: C5: 1+     Active Range of Motion  Cervical Spine     Flexion No limitation without pain   Extension No limitation without pain   Right Side Bending No limitation without pain   Left Side Bending No limitation without pain   Right Rotation No limitation without pain   Left Rotation No limitation with pain        Thoracic Spine   2023   Flexion No limitation without pain   Extension No limitation without pain   Right Side Bending No limitation without pain   Left Side Bending No limitation without pain   Right Rotation No limitation without pain   Left Rotation No limitation without pain       Shoulder    2023    LEFT RIGHT   Flexion WFL WFL   [facility] WFL* [facility]   Internal Rotation [facility]   External Rotation WFL WFL   *Indicates pain with testing    Elbow   2023    LEFT RIGHT   Flexion WFL WFL   Extension [facility] WFL   *Indicates pain with testing    Passive Shoulder Range of Motion in Supine   2023    LEFT RIGHT   Flexion WFL WFL   [facility]   Internal Rotation [facility] WFL   External Rotation WFL WFL   *Indicates pain with testing    Mechanical Assessment  In Standing:  - Repeated shoulder extension - decreasing  - RM shoulder extension w OP - decreasing , better    UE MMT   2023    LEFT RIGHT   Shoulder Shrug 5 5   Shoulder Abduction 4+ 5   Shoulder External Rotation 4+ 5   Shoulder Internal Rotation 5 5   Shoulder Flexion 5 5   Shoulder Extension 5 5        *Indicates pain with testing      Joint Play  - Anterior Capsule: Normal  - Posterior Capsule: Normal  - Inferior Capsule: Normal      Palpation  (-) TTP of RTC tendons, biceps tendons on L    Diagnostic Tests Performed  Positive: Painful Arc  Negative:  Drop Arm, Empty Can and Full Can               Insurance:  AMA/CMS Eval/ Re-eval POC expires Ottis Lefort #/ Referral # Total units  Start date  Expiration date Extension  Visit limitation? PT only or  PT+OT? Co-Insurance   [facility], [facility] 4/27/2023 8 weeks - 6/22/2023 66                                                                          Date 4/27              Units:  Used               Authed:  Remaining                  Patient provided verbal consent to treatment plan and recommended interventions      Date 4/27/2023        Visit Number IE        Manual                                             Neuro Re-Ed                                                                                                   TherEx         RM shoulder ext x10  3x10 w OP                                                              TherAct         Patient education 8' HEP and POC                                            Gait Training                                    Modalities         CP               Precautions: none  Past Medical History:   Diagnosis Date   • Bradycardia 04/11/2019   • Calculus of gallbladder and bile duct with obstruction without cholecystitis    • Gallstone pancreatitis 04/10/2019    Added automatically from request for surgery [id number]   • GERD (gastroesophageal reflux disease)    • Hematemesis 04/11/2019   • Hypertension    • Pancreatitis     blocked by gall stones   • Wears glasses

## 2023-04-28 ENCOUNTER — PREP FOR PROCEDURE (OUTPATIENT)
Dept: GASTROENTEROLOGY | Facility: CLINIC | Age: 63
End: 2023-04-28

## 2023-04-28 ENCOUNTER — TELEPHONE (OUTPATIENT)
Dept: GASTROENTEROLOGY | Facility: CLINIC | Age: 63
End: 2023-04-28

## 2023-04-28 DIAGNOSIS — Z86.010 HISTORY OF COLON POLYPS: Primary | ICD-10-CM

## 2023-04-28 NOTE — TELEPHONE ENCOUNTER
04/28/23  Screened by: Alvin Mata    Referring Provider N/A    Pre- Screening: There is no height or weight on file to calculate BMI  Has patient been referred for a routine screening Colonoscopy? yes  Is the patient between 39-70 years old? yes      Previous Colonoscopy yes   If yes:    Date: 10/2019    Facility:     Reason:       SCHEDULING STAFF: If the patient is between 45yrs-49yrs, please advise patient to confirm benefits/coverage with their insurance company for a routine screening colonoscopy, some insurance carriers will only cover at [address] or older  If the patient is over 66years old, please schedule an office visit  Does the patient want to see a Gastroenterologist prior to their procedure OR are they having any GI symptoms? no    Has the patient been hospitalized or had abdominal surgery in the past 6 months? no    Does the patient use supplemental oxygen? no    Does the patient take Coumadin, Lovenox, Plavix, Elliquis, Xarelto, or other blood thinning medication? no    Has the patient had a stroke, cardiac event, or stent placed in the past year? no    SCHEDULING STAFF: If patient answers NO to above questions, then schedule procedure  If patient answers YES to above questions, then schedule office appointment  Pt Passed OA    If patient is between 45yrs - 49yrs, please advise patient that we will have to confirm benefits & coverage with their insurance company for a routine screening colonoscopy

## 2023-04-28 NOTE — TELEPHONE ENCOUNTER
Scheduled date of colonoscopy (as of today): 6/21/2023  Physician performing colonoscopy: Dr Billy Tamayo  Location of colonoscopy: [facility]  Clearances: N/A

## 2023-05-01 ENCOUNTER — OFFICE VISIT (OUTPATIENT)
Dept: PHYSICAL THERAPY | Facility: CLINIC | Age: 63
End: 2023-05-01

## 2023-05-01 DIAGNOSIS — M75.42 IMPINGEMENT SYNDROME OF LEFT SHOULDER: ICD-10-CM

## 2023-05-01 DIAGNOSIS — S49.92XA SHOULDER INJURY, LEFT, INITIAL ENCOUNTER: Primary | ICD-10-CM

## 2023-05-01 NOTE — PROGRESS NOTES
Daily Note     Today's date: 2023  Patient name: Jocy Stoner  : 1960  MRN: [id number]  Referring provider: Walter Tamayo MD  Dx:   Encounter Diagnosis     ICD-10-CM    1  Shoulder injury, left, initial encounter  S49  92XA       2  Impingement syndrome of left shoulder  M75 42           Start Time: 1100  Stop Time: 1145  Total time in clinic (min): 45 minutes    Subjective: Patient reports compliance with HEP, continued improvement  Objective: See treatment diary below      Assessment: Tolerated treatment well  Patient demonstrated fatigue post treatment, exhibited good technique with therapeutic exercises and would benefit from continued PT  Monitor response at follow-up and adjust dosage as appropriate  Plan: Continue per plan of care  Insurance:  AMA/CMS Eval/ Re-eval POC expires [facility] #/ Referral # Total units  Start date  Expiration date Extension  Visit limitation? PT only or  PT+OT? Co-Insurance   [facility], [facility] 2023 8 weeks - 2023 66                                                                          Date              Units:  Used 1 1             Authed:  Remaining  11 10               Patient provided verbal consent to treatment plan and recommended interventions      Date 2023       Visit Number IE        Manual         GH mobs  Performed gr 3                                  Neuro Re-Ed         Scap retraction  3x10       Wall slide  3x10       Prone T  3x10       Prone Y                                                               TherEx         RM shoulder ext x10  3x10 w OP 3x10                                                             TherAct         Patient education 8' HEP and POC                                            Gait Training                                    Modalities         CP               Precautions: none  Past Medical History:   Diagnosis Date    Bradycardia 2019    Calculus of gallbladder and bile duct with obstruction without cholecystitis     Gallstone pancreatitis 04/10/2019    Added automatically from request for surgery [id number]    GERD (gastroesophageal reflux disease)     Hematemesis 04/11/2019    Hypertension     Pancreatitis     blocked by gall stones    Wears glasses

## 2023-05-02 DIAGNOSIS — I10 PRIMARY HYPERTENSION: Primary | ICD-10-CM

## 2023-05-02 RX ORDER — RAMIPRIL 2.5 MG/1
2.5 CAPSULE ORAL DAILY
Qty: 30 CAPSULE | Refills: 5 | Status: SHIPPED | OUTPATIENT
Start: 2023-05-02

## 2023-05-22 DIAGNOSIS — I10 PRIMARY HYPERTENSION: ICD-10-CM

## 2023-05-22 RX ORDER — RAMIPRIL 5 MG/1
5 CAPSULE ORAL DAILY
Qty: 90 CAPSULE | Refills: 1 | Status: SHIPPED | OUTPATIENT
Start: 2023-05-22

## 2023-05-23 ENCOUNTER — OFFICE VISIT (OUTPATIENT)
Dept: PHYSICAL THERAPY | Facility: CLINIC | Age: 63
End: 2023-05-23

## 2023-05-23 DIAGNOSIS — M75.42 IMPINGEMENT SYNDROME OF LEFT SHOULDER: ICD-10-CM

## 2023-05-23 DIAGNOSIS — S49.92XA SHOULDER INJURY, LEFT, INITIAL ENCOUNTER: Primary | ICD-10-CM

## 2023-05-23 NOTE — PROGRESS NOTES
Daily Note     Today's date: 2023  Patient name: Em Oleary  : 1960  MRN: [id number]  Referring provider: Haley Barba MD  Dx:   Encounter Diagnosis     ICD-10-CM    1  Shoulder injury, left, initial encounter  S49  92XA       2  Impingement syndrome of left shoulder  M75 42           Start Time: 1013  Stop Time: 1053  Total time in clinic (min): 40 minutes    Subjective: Patient has been away on a trip and forgot his paper, then re-injured it on the train car when it jostled  Objective: See treatment diary below  + painful arc, full ROM but painful      Assessment: Tolerated treatment well  Patient demonstrated fatigue post treatment, exhibited good technique with therapeutic exercises and would benefit from continued PT  Patient appears to have minor strain of RTC after re injury, will continue to monitor  Able to tolerate scapular stabilization well, with minor pain above 90 deg flexion  Plan: Continue per plan of care  Insurance:  AMA/CMS Eval/ Re-eval POC expires Benjaman Payment #/ Referral # Total units  Start date  Expiration date Extension  Visit limitation? PT only or  PT+OT? Co-Insurance   [facility], [facility] 2023 8 weeks - 2023 66                                                                          Date             Units:  Used 1 1 1            Authed:  Remaining  11 10 9              Patient provided verbal consent to treatment plan and recommended interventions      Date 2023      Visit Number IE 2/12 3/12      Manual         GH mobs  Performed gr 3 Performed gr 3-4      STM   Performed                        Neuro Re-Ed         Scap retraction  3x10 3x10      Wall slide  3x10 3x10      Prone T  3x10 3x10      Prone Y         SL ER   3x10      SL abduction   3x10      TB row/sh ext   3x10 GTB      TB ER   3x10 GTB      Face pull   x10 RTB               TherEx         RM shoulder ext x10  3x10 w OP 3x10 2x10      Body blade   3x30s elbow flexed                                                   TherAct         Patient education 8' HEP and POC                                            Gait Training                                    Modalities         CP               Precautions: none  Past Medical History:   Diagnosis Date   • Bradycardia 04/11/2019   • Calculus of gallbladder and bile duct with obstruction without cholecystitis    • Gallstone pancreatitis 04/10/2019    Added automatically from request for surgery [id number]   • GERD (gastroesophageal reflux disease)    • Hematemesis 04/11/2019   • Hypertension    • Pancreatitis     blocked by gall stones   • Wears glasses

## 2023-05-25 ENCOUNTER — OFFICE VISIT (OUTPATIENT)
Dept: PHYSICAL THERAPY | Facility: CLINIC | Age: 63
End: 2023-05-25

## 2023-05-25 DIAGNOSIS — M75.42 IMPINGEMENT SYNDROME OF LEFT SHOULDER: ICD-10-CM

## 2023-05-25 DIAGNOSIS — S49.92XA SHOULDER INJURY, LEFT, INITIAL ENCOUNTER: Primary | ICD-10-CM

## 2023-05-25 NOTE — PROGRESS NOTES
Daily Note     Today's date: 2023  Patient name: Burt Lopez  : 1960  MRN: [id number]  Referring provider: José Mederos MD  Dx:   Encounter Diagnosis     ICD-10-CM    1  Shoulder injury, left, initial encounter  S49  92XA PT plan of care cert/re-cert      2  Impingement syndrome of left shoulder  M75 42 PT plan of care cert/re-cert          Start Time: 924  Stop Time: 100  Total time in clinic (min): 41 minutes    Subjective: Patient reports his shoulder felt good but sore after last visit  Objective: See treatment diary below    Assessment: Tolerated treatment well  Patient demonstrated fatigue post treatment, exhibited good technique with therapeutic exercises and would benefit from continued PT  Maintained previous program due to increased soreness on presentation  Limited progress in the past 4 visits due to trip and re-injury, will extend POC by 4 weeks to account for lack of progress  Goals  Short Term Goals (4 weeks - 2023):    - Patient will be independent in basic HEP 2-3 weeks  - Patient will report >50% reduction in pain  - Patient will demonstrate >1/3 improvement in MMT grade as applicable  - Demonstrate consistent posture corrections without cueing during therapeutic exercise  - Patient will have full ROM    Long Term Goals (8 weeks - 2023 ):  - Patient will be independent in a comprehensive home exercise program  - Patient FOTO score will improve to 76/100  - Patient will self-report >80% improvement in function  - Patient will lift 25# overhead  - Patient will reach behind the seat in the front seat of the car    Plan: Continue per plan of care  Insurance:  AMA/CMS Eval/ Re-eval POC expires [facility] #/ Referral # Total units  Start date  Expiration date Extension  Visit limitation? PT only or  PT+OT?  Co-Insurance   [facility], [facility] 2023 8 weeks - 2023 66 Auth # [id number] approved 12 visits  Date 4/27 5/1 5/23 5/25           Units:  Used 1 1 1 1           Authed:  Remaining  11 10 9 8             Patient provided verbal consent to treatment plan and recommended interventions      Date 4/27/2023 5/1/23 5/23/23 5/25/23     Visit Number IE 2/12 3/12 4/12     Manual         GH mobs  Performed gr 3 Performed gr 3-4 Performed gr 3-4     STM   Performed Performed                       Neuro Re-Ed         Scap retraction  3x10 3x10 3x15     Wall slide  3x10 3x10 3x10     Prone T  3x10 3x10 3x10     Prone Y    x10     SL ER   3x10 3x10     SL abduction   3x10 3x10     TB row/sh ext   3x10 GTB 3x10 GTB     TB ER   3x10 GTB 3x10 GTB     Face pull   x10 RTB x10 YTB              TherEx         RM shoulder ext x10  3x10 w OP 3x10 2x10 x30     Body blade   3x30s elbow flexed 3x30s elbow flexed                                                  TherAct         Patient education 8' HEP and POC                                            Gait Training                                    Modalities         CP               Precautions: none  Past Medical History:   Diagnosis Date   • Bradycardia 04/11/2019   • Calculus of gallbladder and bile duct with obstruction without cholecystitis    • Gallstone pancreatitis 04/10/2019    Added automatically from request for surgery [id number]   • GERD (gastroesophageal reflux disease)    • Hematemesis 04/11/2019   • Hypertension    • Pancreatitis     blocked by gall stones   • Wears glasses

## 2023-05-30 ENCOUNTER — OFFICE VISIT (OUTPATIENT)
Dept: PHYSICAL THERAPY | Facility: CLINIC | Age: 63
End: 2023-05-30

## 2023-05-30 DIAGNOSIS — S49.92XA SHOULDER INJURY, LEFT, INITIAL ENCOUNTER: Primary | ICD-10-CM

## 2023-05-30 DIAGNOSIS — M75.42 IMPINGEMENT SYNDROME OF LEFT SHOULDER: ICD-10-CM

## 2023-05-30 NOTE — PROGRESS NOTES
Daily Note     Today's date: 2023  Patient name: Ynes Elias  : 1960  MRN: [id number]  Referring provider: Jeromy Correia MD  Dx:   Encounter Diagnosis     ICD-10-CM    1  Shoulder injury, left, initial encounter  S49  92XA       2  Impingement syndrome of left shoulder  M75 42           Start Time: 1010  Stop Time: 1055  Total time in clinic (min): 45 minutes    Subjective: Patient reports his shoulder is doing a bit better, less soreness after last visit  Objective: See treatment diary below    Assessment: Tolerated treatment well  Patient demonstrated fatigue post treatment, exhibited good technique with therapeutic exercises and would benefit from continued PT  Introduced stretching with fair tolerance, able to progress resistance w good performance  Plan: Continue per plan of care  Insurance:  AMA/CMS Eval/ Re-eval POC expires Leo Friedar #/ Referral # Total units  Start date  Expiration date Extension  Visit limitation? PT only or  PT+OT? Co-Insurance   [facility], [facility] 2023 8 weeks - 2023 66 Auth # [id number] approved 12 visits                                                                       Date           Units:  Used 1 1 1 1 1          Authed:  Remaining  11 10 9 8 7            Patient provided verbal consent to treatment plan and recommended interventions      Date 2023    Visit Number IE 2/12 3/12 4/12 5/12    Manual         GH mobs  Performed gr 3 Performed gr 3-4 Performed gr 3-4 Performed gr 3-4    STM   Performed Performed                       Neuro Re-Ed         Scap retraction  3x10 3x10 3x15 3x15 RTB    Wall slide  3x10 3x10 3x10 3x10    Prone T  3x10 3x10 3x10 -    Prone Y    x10 -    SL ER   3x10 3x10 3x10    SL abduction   3x10 3x10 3x10    TB row/sh ext   3x10 GTB 3x10 GTB 3x10 9# CC    TB ER   3x10 GTB 3x10 GTB 2x10, x4 4# CC, x6 2 5#    Face pull   x10 RTB x10 YTB x20 4# CC    TRX Row TherEx         RM shoulder ext x10  3x10 w OP 3x10 2x10 x30 x30    Body blade   3x30s elbow flexed 3x30s elbow flexed 3x30s elbow straight    Pec stretch     5x10s    Lat stretch     10x10s on TRX                               TherAct         Patient education 8' HEP and POC                                            Gait Training                                    Modalities         CP               Precautions: none  Past Medical History:   Diagnosis Date   • Bradycardia 04/11/2019   • Calculus of gallbladder and bile duct with obstruction without cholecystitis    • Gallstone pancreatitis 04/10/2019    Added automatically from request for surgery [id number]   • GERD (gastroesophageal reflux disease)    • Hematemesis 04/11/2019   • Hypertension    • Pancreatitis     blocked by gall stones   • Wears glasses

## 2023-06-01 ENCOUNTER — OFFICE VISIT (OUTPATIENT)
Dept: PHYSICAL THERAPY | Facility: CLINIC | Age: 63
End: 2023-06-01

## 2023-06-01 DIAGNOSIS — S49.92XA SHOULDER INJURY, LEFT, INITIAL ENCOUNTER: Primary | ICD-10-CM

## 2023-06-01 DIAGNOSIS — M75.42 IMPINGEMENT SYNDROME OF LEFT SHOULDER: ICD-10-CM

## 2023-06-01 NOTE — PROGRESS NOTES
Daily Note     Today's date: 2023  Patient name: Karen Campbell  : 1960  MRN: [id number]  Referring provider: Hermon Alpers, MD  Dx:   Encounter Diagnosis     ICD-10-CM    1  Shoulder injury, left, initial encounter  S49  92XA       2  Impingement syndrome of left shoulder  M75 42           Start Time: 1015  Stop Time: 1056  Total time in clinic (min): 41 minutes    Subjective: Patient reports his shoulder is doing a bit better, less soreness after last visit  Objective: See treatment diary below    Assessment: Tolerated treatment well  Patient demonstrated fatigue post treatment, exhibited good technique with therapeutic exercises and would benefit from continued PT  Maintained previous program due to increased soreness after last visit          Plan: Continue per plan of care  Insurance:  AMA/CMS Eval/ Re-eval POC expires Cornel Smoke #/ Referral # Total units  Start date  Expiration date Extension  Visit limitation? PT only or  PT+OT? Co-Insurance   [facility], [facility] 2023 8 weeks - 2023 66 Auth # [id number] approved 12 visits           75                                                            Date          Units:  Used 1 1 1 1 1 1         Authed:  Remaining  11 10 9 8 7 6           Patient provided verbal consent to treatment plan and recommended interventions      Date 2023   Visit Number IE 2/12 3/12 4/12 5/12 6/12   Manual         GH mobs  Performed gr 3 Performed gr 3-4 Performed gr 3-4 Performed gr 3-4 Performed gr 3-4   STM   Performed Performed  Performed                     Neuro Re-Ed         Scap retraction  3x10 3x10 3x15 3x15 RTB 3x15 RTB   Wall slide  3x10 3x10 3x10 3x10 3x10 light loop   Prone T  3x10 3x10 3x10 - 3x10   Prone Y    x10 - x15   SL ER   3x10 3x10 3x10 3x10   SL abduction   3x10 3x10 3x10 3x10   TB row/sh ext   3x10 GTB 3x10 GTB 3x10 9# CC 3x10 9# CC   TB ER   3x10 GTB 3x10 GTB 2x10, x4 4# CC, x6 2 5# 2x10, x4 4# CC, x10 2 5#   Face pull   x10 RTB x10 YTB x20 4# CC 2x10 4# CC   TRX Row         TherEx         RM shoulder ext x10  3x10 w OP 3x10 2x10 x30 x30 x30   Body blade   3x30s elbow flexed 3x30s elbow flexed 3x30s elbow straight 3x30s elbow straight   Pec stretch     5x10s 5x15s   Lat stretch     10x10s on TRX                               TherAct         Patient education 8' HEP and POC                                            Gait Training                                    Modalities         CP               Precautions: none  Past Medical History:   Diagnosis Date   • Bradycardia 04/11/2019   • Calculus of gallbladder and bile duct with obstruction without cholecystitis    • Gallstone pancreatitis 04/10/2019    Added automatically from request for surgery [id number]   • GERD (gastroesophageal reflux disease)    • Hematemesis 04/11/2019   • Hypertension    • Pancreatitis     blocked by gall stones   • Wears glasses

## 2023-06-05 ENCOUNTER — OFFICE VISIT (OUTPATIENT)
Dept: PHYSICAL THERAPY | Facility: CLINIC | Age: 63
End: 2023-06-05
Payer: COMMERCIAL

## 2023-06-05 DIAGNOSIS — S49.92XA SHOULDER INJURY, LEFT, INITIAL ENCOUNTER: Primary | ICD-10-CM

## 2023-06-05 DIAGNOSIS — M75.42 IMPINGEMENT SYNDROME OF LEFT SHOULDER: ICD-10-CM

## 2023-06-05 PROCEDURE — 97140 MANUAL THERAPY 1/> REGIONS: CPT

## 2023-06-05 PROCEDURE — 97110 THERAPEUTIC EXERCISES: CPT

## 2023-06-05 NOTE — PROGRESS NOTES
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 PT only or  PT+OT? Co-Insurance   [facility], [facility] 2023 8 weeks - 2023 66 Auth # [id number] approved 12 visits           75                                                            Date         Units:  Used 1 1 1 1 1 1 1        Authed:  Remaining  11 10 9 8 7 6 5          Patient provided verbal consent to treatment plan and recommended interventions      Date 23   Visit Number 2/12 3/12 4/12 5/12 6/12 7/12   Manual         GH mobs Performed gr 3 Performed gr 3-4 Performed gr 3-4 Performed gr 3-4 Performed gr 3-4 Performed gr 3-4   STM  Performed Performed  Performed                      Neuro Re-Ed         Scap retraction 3x10 3x10 3x15 3x15 RTB 3x15 RTB    Wall slide 3x10 3x10 3x10 3x10 3x10 light loop 2x10 light loop   Prone T 3x10 3x10 3x10 - 3x10 3x10   Prone Y   x10 - x15 3x10   SL ER  3x10 3x10 3x10 3x10 3x10   SL abduction  3x10 3x10 3x10 3x10 3x10   TB row/sh ext  3x10 GTB 3x10 GTB 3x10 9# CC 3x10 9# CC 3x10 9# CC   TB ER  3x10 GTB 3x10 GTB 2x10, x4 4# CC, x6 2 5# 2x10, x4 4# CC, x10 2 5# 3x10 2 5# CC   Face pull  x10 RTB x10 YTB x20 4# CC 2x10 4# CC 3x10 4# CC   LT activation o pulleys      x20   TRX Row         TherEx         RM shoulder ext 3x10 2x10 x30 x30 x30    Body blade  3x30s elbow flexed 3x30s elbow flexed 3x30s elbow straight 3x30s elbow straight    Pec stretch    5x10s 5x15s 5x15s   Lat stretch    10x10s on TRX  5x15s                              TherAct         Patient education                                             Gait Training                                    Modalities         CP               Precautions: none  Past Medical History:   Diagnosis Date   • Bradycardia 04/11/2019   • Calculus of gallbladder and bile duct with obstruction without cholecystitis    • Gallstone pancreatitis 04/10/2019    Added automatically from request for surgery [id number]   • GERD (gastroesophageal reflux disease)    • Hematemesis 04/11/2019   • Hypertension    • Pancreatitis     blocked by gall stones   • Wears glasses

## 2023-06-07 ENCOUNTER — OFFICE VISIT (OUTPATIENT)
Dept: PHYSICAL THERAPY | Facility: CLINIC | Age: 63
End: 2023-06-07
Payer: COMMERCIAL

## 2023-06-07 ENCOUNTER — APPOINTMENT (OUTPATIENT)
Dept: PHYSICAL THERAPY | Facility: CLINIC | Age: 63
End: 2023-06-07
Payer: COMMERCIAL

## 2023-06-07 DIAGNOSIS — M75.42 IMPINGEMENT SYNDROME OF LEFT SHOULDER: ICD-10-CM

## 2023-06-07 DIAGNOSIS — S49.92XA SHOULDER INJURY, LEFT, INITIAL ENCOUNTER: Primary | ICD-10-CM

## 2023-06-07 PROCEDURE — 97140 MANUAL THERAPY 1/> REGIONS: CPT

## 2023-06-07 PROCEDURE — 97110 THERAPEUTIC EXERCISES: CPT

## 2023-06-07 PROCEDURE — 97112 NEUROMUSCULAR REEDUCATION: CPT

## 2023-06-07 NOTE — PROGRESS NOTES
Daily Note     Today's date: 2023  Patient name: Evonne Mendez  : 1960  MRN: [id number]  Referring provider: Cathy Bedoya MD  Dx:   Encounter Diagnosis     ICD-10-CM    1  Shoulder injury, left, initial encounter  S49  92XA       2  Impingement syndrome of left shoulder  M75 42           Start Time: 927  Stop Time: 1007  Total time in clinic (min): 40 minutes    Subjective: Patient reports that he was sore after last visit and is still sore today  Objective: See treatment diary below  + median nerve tension  + painful arc  - drop arm    Repeated horz add - decreasing, better    Assessment: Tolerated treatment well  Patient demonstrated fatigue post treatment, exhibited good technique with therapeutic exercises and would benefit from continued PT  Regressed program due to intensity of soreness, focused on sagittal plane motions and scap stability  Horizontal adduction improved painful arc, given as HEP for the weekend and reassess on Monday  Able to improve neural tension but no affect on pain levels with abduction  Plan: Continue per plan of care  Potential consider cortisone injection due to lack of progress  Insurance:  AMA/CMS Eval/ Re-eval POC expires [facility] #/ Referral # Total units  Start date  Expiration date Extension  Visit limitation? PT only or  PT+OT? Co-Insurance   [facility], [facility] 2023 8 weeks - 2023 66 Auth # [id number] approved 12 visits           75                                                            Date        Units:  Used 1 2 3 4 5 6 7 8       Authed:  Remaining  11 10 9 8 7 6 5 4         Patient provided verbal consent to treatment plan and recommended interventions      Date 23   Visit Number 3/12 4/12 5/12 6/12 7/12 8/12   Manual         GH mobs Performed gr 3-4 Performed gr 3-4 Performed gr 3-4 Performed gr 3-4 Performed gr 3-4 Performed gr 3-4   STM Performed Performed Performed  Performed         Median n glives 8'            Neuro Re-Ed         Scap retraction 3x10 3x15 3x15 RTB 3x15 RTB  3x15   Wall slide 3x10 3x10 3x10 3x10 light loop 2x10 light loop 3x10   Prone T 3x10 3x10 - 3x10 3x10    Prone Y  x10 - x15 3x10    SL ER 3x10 3x10 3x10 3x10 3x10 3x10   SL abduction 3x10 3x10 3x10 3x10 3x10 3x10   TB row/sh ext 3x10 GTB 3x10 GTB 3x10 9# CC 3x10 9# CC 3x10 9# CC 3x10 7 5# CC   TB ER 3x10 GTB 3x10 GTB 2x10, x4 4# CC, x6 2 5# 2x10, x4 4# CC, x10 2 5# 3x10 2 5# CC -   Face pull x10 RTB x10 YTB x20 4# CC 2x10 4# CC 3x10 4# CC -   LT activation o pulleys     x20 x20   TRX Row         TherEx         RM shoulder ext 2x10 x30 x30 x30  RM horz add 3x10   Body blade 3x30s elbow flexed 3x30s elbow flexed 3x30s elbow straight 3x30s elbow straight  3x30s elbow flexed   Pec stretch   5x10s 5x15s 5x15s    Lat stretch   10x10s on TRX  5x15s                               TherAct         Patient education                                             Gait Training                                    Modalities         CP               Precautions: none  Past Medical History:   Diagnosis Date   • Bradycardia 04/11/2019   • Calculus of gallbladder and bile duct with obstruction without cholecystitis    • Gallstone pancreatitis 04/10/2019    Added automatically from request for surgery [id number]   • GERD (gastroesophageal reflux disease)    • Hematemesis 04/11/2019   • Hypertension    • Pancreatitis     blocked by gall stones   • Wears glasses

## 2023-06-12 ENCOUNTER — OFFICE VISIT (OUTPATIENT)
Dept: FAMILY MEDICINE CLINIC | Facility: CLINIC | Age: 63
End: 2023-06-12
Payer: COMMERCIAL

## 2023-06-12 VITALS
DIASTOLIC BLOOD PRESSURE: 86 MMHG | RESPIRATION RATE: 16 BRPM | HEART RATE: 73 BPM | SYSTOLIC BLOOD PRESSURE: 140 MMHG | BODY MASS INDEX: 28.11 KG/M2 | OXYGEN SATURATION: 97 % | HEIGHT: 74 IN | WEIGHT: 219 LBS

## 2023-06-12 DIAGNOSIS — N18.31 STAGE 3A CHRONIC KIDNEY DISEASE (HCC): ICD-10-CM

## 2023-06-12 DIAGNOSIS — S49.92XD SHOULDER INJURY, LEFT, SUBSEQUENT ENCOUNTER: Primary | ICD-10-CM

## 2023-06-12 DIAGNOSIS — E66.3 OVERWEIGHT (BMI 25.0-29.9): ICD-10-CM

## 2023-06-12 DIAGNOSIS — I10 PRIMARY HYPERTENSION: ICD-10-CM

## 2023-06-12 DIAGNOSIS — E78.5 DYSLIPIDEMIA: ICD-10-CM

## 2023-06-12 PROCEDURE — 99214 OFFICE O/P EST MOD 30 MIN: CPT | Performed by: FAMILY MEDICINE

## 2023-06-13 ENCOUNTER — OFFICE VISIT (OUTPATIENT)
Dept: PHYSICAL THERAPY | Facility: CLINIC | Age: 63
End: 2023-06-13
Payer: COMMERCIAL

## 2023-06-13 ENCOUNTER — APPOINTMENT (OUTPATIENT)
Dept: LAB | Facility: CLINIC | Age: 63
End: 2023-06-13
Payer: COMMERCIAL

## 2023-06-13 DIAGNOSIS — S49.92XA SHOULDER INJURY, LEFT, INITIAL ENCOUNTER: Primary | ICD-10-CM

## 2023-06-13 DIAGNOSIS — E66.3 OVER WEIGHT: ICD-10-CM

## 2023-06-13 DIAGNOSIS — M75.42 IMPINGEMENT SYNDROME OF LEFT SHOULDER: ICD-10-CM

## 2023-06-13 DIAGNOSIS — N18.31 CHRONIC KIDNEY DISEASE, STAGE 3A (HCC): ICD-10-CM

## 2023-06-13 DIAGNOSIS — E78.5 HYPERLIPIDEMIA, UNSPECIFIED HYPERLIPIDEMIA TYPE: Primary | ICD-10-CM

## 2023-06-13 LAB
ANION GAP SERPL CALCULATED.3IONS-SCNC: 0 MMOL/L (ref 4–13)
BUN SERPL-MCNC: 20 MG/DL (ref 5–25)
CALCIUM SERPL-MCNC: 9.1 MG/DL (ref 8.3–10.1)
CHLORIDE SERPL-SCNC: 110 MMOL/L (ref 96–108)
CHOLEST SERPL-MCNC: 183 MG/DL
CO2 SERPL-SCNC: 29 MMOL/L (ref 21–32)
CREAT SERPL-MCNC: 1.31 MG/DL (ref 0.6–1.3)
CREAT UR-MCNC: 106 MG/DL
GFR SERPL CREATININE-BSD FRML MDRD: 57 ML/MIN/1.73SQ M
GLUCOSE P FAST SERPL-MCNC: 99 MG/DL (ref 65–99)
HDLC SERPL-MCNC: 61 MG/DL
LDLC SERPL CALC-MCNC: 106 MG/DL (ref 0–100)
MICROALBUMIN UR-MCNC: 5.5 MG/L (ref 0–20)
MICROALBUMIN/CREAT 24H UR: 5 MG/G CREATININE (ref 0–30)
POTASSIUM SERPL-SCNC: 4.4 MMOL/L (ref 3.5–5.3)
SODIUM SERPL-SCNC: 139 MMOL/L (ref 135–147)
TRIGL SERPL-MCNC: 79 MG/DL

## 2023-06-13 PROCEDURE — 97112 NEUROMUSCULAR REEDUCATION: CPT

## 2023-06-13 PROCEDURE — 36415 COLL VENOUS BLD VENIPUNCTURE: CPT

## 2023-06-13 PROCEDURE — 82570 ASSAY OF URINE CREATININE: CPT

## 2023-06-13 PROCEDURE — 97140 MANUAL THERAPY 1/> REGIONS: CPT

## 2023-06-13 PROCEDURE — 97110 THERAPEUTIC EXERCISES: CPT

## 2023-06-13 PROCEDURE — 80061 LIPID PANEL: CPT

## 2023-06-13 PROCEDURE — 80048 BASIC METABOLIC PNL TOTAL CA: CPT

## 2023-06-13 PROCEDURE — 83036 HEMOGLOBIN GLYCOSYLATED A1C: CPT

## 2023-06-13 PROCEDURE — 82043 UR ALBUMIN QUANTITATIVE: CPT

## 2023-06-13 NOTE — PROGRESS NOTES
Daily Note     Today's date: 2023  Patient name: Zechariah Oleary  : 1960  MRN: [id number]  Referring provider: Magda Valdes MD  Dx:   Encounter Diagnosis     ICD-10-CM    1  Shoulder injury, left, initial encounter  S49  92XA       2  Impingement syndrome of left shoulder  M75 42           Start Time: 845  Stop Time: 923  Total time in clinic (min): 38 minutes    Subjective: Patient reports that he developed a cramp like pain on  and that resolved  PCP agreed an injection is warranted at this time and referred to sports med  Objective: See treatment diary below  Supine retraction - better    Assessment: Tolerated treatment well  Patient demonstrated fatigue post treatment, exhibited good technique with therapeutic exercises and would benefit from continued PT  Patient appears to have contribution coming from the cervical spine that was reduced with retractions  Required to stay in supine due to inability to produce action in loaded position  Plan: Continue per plan of care  Focus on posture and scapular strength   Potential consider cortisone injection due to lack of progress  Insurance:  AMA/CMS Eval/ Re-eval POC expires [employer] #/ Referral # Total units  Start date  Expiration date Extension  Visit limitation? PT only or  PT+OT? Co-Insurance   [facility], [facility] 2023 8 weeks - 2023 66 Auth # [id number] approved 12 visits           75                                                            Date       Units:  Used 1 2 3 4 5 6 7 8 9      Authed:  Remaining  11 10 9 8 7 6 5 4 3        Patient provided verbal consent to treatment plan and recommended interventions      Date 23   Visit Number    Manual         GH mobs Performed gr 3-4 Performed gr 3-4 Performed gr 3-4 Performed gr 3-4 Performed gr 3-4    STM Performed  Performed  Performed    Nerve glides Median n glives 8'    Thoracic mobs      Gr 4 PA   Neuro Re-Ed         Scap retraction 3x15 3x15 RTB 3x15 RTB  3x15 3x15   Wall slide 3x10 3x10 3x10 light loop 2x10 light loop 3x10 3x10   Prone T 3x10 - 3x10 3x10     Prone Y x10 - x15 3x10     SL ER 3x10 3x10 3x10 3x10 3x10    SL abduction 3x10 3x10 3x10 3x10 3x10    TB row/sh ext 3x10 GTB 3x10 9# CC 3x10 9# CC 3x10 9# CC 3x10 7 5# CC 3x10 7 5# CC   TB ER 3x10 GTB 2x10, x4 4# CC, x6 2 5# 2x10, x4 4# CC, x10 2 5# 3x10 2 5# CC -    Face pull x10 YTB x20 4# CC 2x10 4# CC 3x10 4# CC -    LT activation o pulleys    x20 x20    TRX Row      x12   TherEx         RM shoulder ext x30 x30 x30  RM horz add 3x10 retraction supine 2x50  Retraction loaded x10   Body blade 3x30s elbow flexed 3x30s elbow straight 3x30s elbow straight  3x30s elbow flexed    Pec stretch  5x10s 5x15s 5x15s     Lat stretch  10x10s on TRX  5x15s     Thoracic ext      x20                     TherAct         Patient education      Posture 8'                                       Gait Training                                    Modalities         CP               Precautions: none  Past Medical History:   Diagnosis Date   • Bradycardia 04/11/2019   • Calculus of gallbladder and bile duct with obstruction without cholecystitis    • Gallstone pancreatitis 04/10/2019    Added automatically from request for surgery [id number]   • GERD (gastroesophageal reflux disease)    • Hematemesis 04/11/2019   • Hypertension    • Pancreatitis     blocked by gall stones   • Wears glasses

## 2023-06-14 LAB
EST. AVERAGE GLUCOSE BLD GHB EST-MCNC: 111 MG/DL
HBA1C MFR BLD: 5.5 %

## 2023-06-15 ENCOUNTER — OFFICE VISIT (OUTPATIENT)
Dept: PHYSICAL THERAPY | Facility: CLINIC | Age: 63
End: 2023-06-15
Payer: COMMERCIAL

## 2023-06-15 DIAGNOSIS — M75.42 IMPINGEMENT SYNDROME OF LEFT SHOULDER: ICD-10-CM

## 2023-06-15 DIAGNOSIS — S49.92XA SHOULDER INJURY, LEFT, INITIAL ENCOUNTER: Primary | ICD-10-CM

## 2023-06-15 PROCEDURE — 97112 NEUROMUSCULAR REEDUCATION: CPT

## 2023-06-15 PROCEDURE — 97110 THERAPEUTIC EXERCISES: CPT

## 2023-06-15 NOTE — PROGRESS NOTES
Daily Note     Today's date: 6/15/2023  Patient name: Rima Potter  : 1960  MRN: [id number]  Referring provider: Herrera Yan MD  Dx:   Encounter Diagnosis     ICD-10-CM    1  Shoulder injury, left, initial encounter  S49  92XA       2  Impingement syndrome of left shoulder  M75 42                      Subjective: Patient reports his shoulder feels the same      Objective: See treatment diary below      Assessment: Tolerated treatment well  Patient would benefit from continued PT      Plan: Continue per plan of care  Insurance:  AMA/CMS Eval/ Re-eval POC expires Amber Gutierrez #/ Referral # Total units  Start date  Expiration date Extension  Visit limitation? PT only or  PT+OT? Co-Insurance   [facility], [facility] 2023 8 weeks - 2023 66 Auth # [id number] approved 12 visits           75                                                            Date 4/27 5/1 5/23 5/25 5/30 6/1 6/5 6/7 6/13 6/15     Units:  Used 1 2 3 4 5 6 7 8 9 10     Authed:  Remaining  11 10 9 8 7 6 5 4 3 2       Patient provided verbal consent to treatment plan and recommended interventions      Date 5/30/23 6/1/23 6/5/23 6/7/23 6/13/23 6/15   Visit Number 5/12 6/12 7/12 8/12 9/12 10/12   Manual         GH mobs Performed gr 3-4 Performed gr 3-4 Performed gr 3-4 Performed gr 3-4     STM  Performed  Performed     Nerve glides    Median n glives 8'     Thoracic mobs     Gr 4 PA    Neuro Re-Ed         Scap retraction 3x15 RTB 3x15 RTB  3x15 3x15 3x15   Wall slide 3x10 3x10 light loop 2x10 light loop 3x10 3x10 3x10   Prone T - 3x10 3x10      Prone Y - x15 3x10      SL ER 3x10 3x10 3x10 3x10  3x10   SL abduction 3x10 3x10 3x10 3x10  3x10   TB row/sh ext 3x10 9# CC 3x10 9# CC 3x10 9# CC 3x10 7 5# CC 3x10 7 5# CC 3x10 7 5# CC   TB ER 2x10, x4 4# CC, x6 2 5# 2x10, x4 4# CC, x10 2 5# 3x10 2 5# CC -     Face pull x20 4# CC 2x10 4# CC 3x10 4# CC -     LT activation o pulleys   x20 x20     TRX Row     x12    TherEx         RM shoulder ext x30 x30 RM horz add 3x10 retraction supine 2x50  Retraction loaded x10 2x50   Body blade 3x30s elbow straight 3x30s elbow straight  3x30s elbow flexed     Pec stretch 5x10s 5x15s 5x15s      Lat stretch 10x10s on TRX  5x15s      Thoracic ext     x20 x20                     TherAct         Patient education     Posture 8'                                        Gait Training                                    Modalities         CP               Precautions: none  Past Medical History:   Diagnosis Date   • Bradycardia 04/11/2019   • Calculus of gallbladder and bile duct with obstruction without cholecystitis    • Gallstone pancreatitis 04/10/2019    Added automatically from request for surgery [id number]   • GERD (gastroesophageal reflux disease)    • Hematemesis 04/11/2019   • Hypertension    • Pancreatitis     blocked by gall stones   • Wears glasses

## 2023-06-20 ENCOUNTER — APPOINTMENT (OUTPATIENT)
Dept: RADIOLOGY | Facility: CLINIC | Age: 63
End: 2023-06-20
Payer: COMMERCIAL

## 2023-06-20 ENCOUNTER — APPOINTMENT (OUTPATIENT)
Dept: PHYSICAL THERAPY | Facility: CLINIC | Age: 63
End: 2023-06-20
Payer: COMMERCIAL

## 2023-06-20 ENCOUNTER — OFFICE VISIT (OUTPATIENT)
Dept: OBGYN CLINIC | Facility: CLINIC | Age: 63
End: 2023-06-20
Payer: COMMERCIAL

## 2023-06-20 VITALS
SYSTOLIC BLOOD PRESSURE: 140 MMHG | HEART RATE: 68 BPM | HEIGHT: 74 IN | BODY MASS INDEX: 28.23 KG/M2 | DIASTOLIC BLOOD PRESSURE: 80 MMHG | WEIGHT: 220 LBS

## 2023-06-20 DIAGNOSIS — S49.92XD SHOULDER INJURY, LEFT, SUBSEQUENT ENCOUNTER: ICD-10-CM

## 2023-06-20 PROCEDURE — 73030 X-RAY EXAM OF SHOULDER: CPT

## 2023-06-20 PROCEDURE — 20610 DRAIN/INJ JOINT/BURSA W/O US: CPT | Performed by: ORTHOPAEDIC SURGERY

## 2023-06-20 PROCEDURE — 99204 OFFICE O/P NEW MOD 45 MIN: CPT | Performed by: ORTHOPAEDIC SURGERY

## 2023-06-20 RX ORDER — DEXAMETHASONE SODIUM PHOSPHATE 10 MG/ML
40 INJECTION, SOLUTION INTRAMUSCULAR; INTRAVENOUS
Status: COMPLETED | OUTPATIENT
Start: 2023-06-20 | End: 2023-06-20

## 2023-06-20 RX ORDER — LIDOCAINE HYDROCHLORIDE 10 MG/ML
4 INJECTION, SOLUTION INFILTRATION; PERINEURAL
Status: COMPLETED | OUTPATIENT
Start: 2023-06-20 | End: 2023-06-20

## 2023-06-20 RX ADMIN — LIDOCAINE HYDROCHLORIDE 4 ML: 10 INJECTION, SOLUTION INFILTRATION; PERINEURAL at 15:15

## 2023-06-20 RX ADMIN — DEXAMETHASONE SODIUM PHOSPHATE 40 MG: 10 INJECTION, SOLUTION INTRAMUSCULAR; INTRAVENOUS at 15:15

## 2023-06-20 NOTE — PROGRESS NOTES
"Assessment/Plan:  1  Shoulder injury, left, subsequent encounter  Ambulatory Referral to Sports Medicine    XR shoulder 2+ vw left        Fifi Lui has left-sided shoulder pain consistent with impingement syndrome  He has failed conservative measures of physical therapy but he does not demonstrate significant weakness on examination today  I discussed treatment options which could include a subacromial cortisone injection or consideration of further imaging with an MRI  He states he would like to try the injection first and if the pain would persist we could consider the MRI  This is reasonable  He will follow-up with me if the pain continues or worsens in the next 3 to 4 weeks  Large joint arthrocentesis: L subacromial bursa  Universal Protocol:  Consent given by: patient  Time out: Immediately prior to procedure a \"time out\" was called to verify the correct patient, procedure, equipment, support staff and site/side marked as required  Site marked: the operative site was marked  Supporting Documentation  Indications: pain   Procedure Details  Location: shoulder - L subacromial bursa  Preparation: Patient was prepped and draped in the usual sterile fashion  Needle size: 22 G  Ultrasound guidance: no  Approach: posterior  Medications administered: 40 mg dexamethasone 100 mg/10 mL; 4 mL lidocaine 1 %    Patient tolerance: patient tolerated the procedure well with no immediate complications  Dressing:  Sterile dressing applied            Subjective:   Adriana Wilde is a 61 y o  male who presents to the office for evaluation for left-sided shoulder pain  He primarily presented to his PCP after a shoulder injury to the left shoulder several months ago while moving a crate  He had significant pain in the shoulder when moving the crate and felt discomfort over the left shoulder  He went to his PCP and was sent for physical therapy    He completed 6 weeks of physical therapy and continued to have discomfort over the " lateral portion of the shoulder  His pain worsens with overhead movement and he would feel pinching pain within the shoulder  Denies any significant weakness or problem with the shoulder in the past       Review of Systems   Constitutional: Negative for chills, fever and unexpected weight change  HENT: Negative for hearing loss, nosebleeds and sore throat  Eyes: Negative for pain, redness and visual disturbance  Respiratory: Negative for cough, shortness of breath and wheezing  Cardiovascular: Negative for chest pain, palpitations and leg swelling  Gastrointestinal: Negative for abdominal pain, nausea and vomiting  Endocrine: Negative for polyphagia and polyuria  Genitourinary: Negative for dysuria and hematuria  Musculoskeletal:        See HPI   Skin: Negative for rash and wound  Neurological: Negative for dizziness, numbness and headaches  Psychiatric/Behavioral: Negative for decreased concentration and suicidal ideas  The patient is not nervous/anxious  Past Medical History:   Diagnosis Date   • Bradycardia 04/11/2019   • Calculus of gallbladder and bile duct with obstruction without cholecystitis    • Gallstone pancreatitis 04/10/2019    Added automatically from request for surgery [id number]   • GERD (gastroesophageal reflux disease)    • Hematemesis 04/11/2019   • Hypertension    • Pancreatitis     blocked by gall stones   • Wears glasses        Past Surgical History:   Procedure Laterality Date   • CHOLECYSTECTOMY      Lap   • CHOLECYSTECTOMY LAPAROSCOPIC N/A 4/15/2019    Procedure: CHOLECYSTECTOMY LAPAROSCOPIC;  Surgeon: Amparo Allison MD;  Location: [address] [place] [address];  Service: General   • EGD     • ERCP N/A 4/15/2019    Procedure: ENDOSCOPIC RETROGRADE CHOLANGIOPANCREATOGRAPHY (ERCP);   Surgeon: Amparo Allison MD;  Location: [facility];  Service: General   • WISDOM TOOTH EXTRACTION      x4       Family History   Problem Relation Age of Onset   • No Known Problems Mother    • No Known Problems Father    • No Known Problems Brother    • No Known Problems Brother        Social History     Occupational History   • Not on file   Tobacco Use   • Smoking status: Never   • Smokeless tobacco: Never   Substance and Sexual Activity   • Alcohol use: Yes     Comment: Rarely, every 3-4 months   • Drug use: Not Currently   • Sexual activity: Not Currently     Partners: Male         Current Outpatient Medications:   •  ascorbic acid (VITAMIN C) 1000 MG tablet, Take 1,000 mg by mouth as needed , Disp: , Rfl:   •  B COMPLEX-BIOTIN-FA PO, Take by mouth 3 (three) times a week , Disp: , Rfl:   •  Blood Pressure Monitoring (B-D ASSURE BPM/AUTO ARM CUFF) MISC, Check BP regularly, Disp: 1 each, Rfl: 0  •  emtricitabine-tenofovir (TRUVADA) 200-300 mg per tablet, , Disp: , Rfl:   •  famotidine (PEPCID) 20 mg tablet, Take 1 tablet (20 mg total) by mouth daily as needed for indigestion or heartburn, Disp: 30 tablet, Rfl: 5  •  multivitamin (THERAGRAN) TABS, Take 1 tablet by mouth 2 (two) times a week , Disp: , Rfl:   •  polyethylene glycol (GOLYTELY) 4000 mL solution, Take 4,000 mL by mouth once for 1 dose, Disp: 4000 mL, Rfl: 0  •  Potassium Bicarbonate 99 MG CAPS, Take by mouth as needed  (Patient not taking: Reported on 6/20/2023), Disp: , Rfl:   •  Probiotic Product (PROBIOTIC ADVANCED) CAPS, Take by mouth as needed , Disp: , Rfl:   •  ramipril (ALTACE) 5 mg capsule, Take 1 capsule (5 mg total) by mouth daily, Disp: 90 capsule, Rfl: 1  •  tadalafil (CIALIS) 5 MG tablet, , Disp: , Rfl:     Allergies   Allergen Reactions   • Azithromycin Diarrhea       Objective:  Vitals:    06/20/23 1515   BP: 140/80   Pulse: 68            Left Shoulder Exam     Tenderness   The patient is experiencing no tenderness       Range of Motion   Active abduction:  150 abnormal   Passive abduction: normal   Extension: normal   External rotation: normal   Forward flexion:  160 abnormal   Internal rotation 0 degrees:  Sacrum abnormal Muscle Strength   Abduction: 5/5   Internal rotation: 5/5   External rotation: 5/5   Supraspinatus: 5/5   Subscapularis: 5/5     Tests   Wheeler test: positive  Impingement: positive  Drop arm: negative    Other   Erythema: absent  Sensation: normal  Pulse: present             Physical Exam  Vitals and nursing note reviewed  Constitutional:       Appearance: Normal appearance  He is well-developed  HENT:      Head: Normocephalic and atraumatic  Right Ear: External ear normal       Left Ear: External ear normal    Eyes:      General: No scleral icterus  Extraocular Movements: Extraocular movements intact  Conjunctiva/sclera: Conjunctivae normal    Cardiovascular:      Rate and Rhythm: Normal rate  Pulmonary:      Effort: Pulmonary effort is normal  No respiratory distress  Musculoskeletal:      Cervical back: Normal range of motion and neck supple  Comments: See Ortho exam   Skin:     General: Skin is warm and dry  Neurological:      Mental Status: He is alert and oriented to person, place, and time  Psychiatric:         Behavior: Behavior normal          I have personally reviewed pertinent films in PACS and my interpretation is as follows:  Left shoulder x-rays demonstrate no evidence of acute fracture or significant degenerative change      This document was created using speech voice recognition software  Grammatical errors, random word insertions, pronoun errors, and incomplete sentences are an occasional consequence of this system due to software limitations, ambient noise, and hardware issues  Any formal questions or concerns about content, text, or information contained within the body of this dictation should be directly addressed to the provider for clarification

## 2023-06-21 ENCOUNTER — ANESTHESIA (OUTPATIENT)
Dept: GASTROENTEROLOGY | Facility: AMBULARY SURGERY CENTER | Age: 63
End: 2023-06-21

## 2023-06-21 ENCOUNTER — HOSPITAL ENCOUNTER (OUTPATIENT)
Dept: GASTROENTEROLOGY | Facility: AMBULARY SURGERY CENTER | Age: 63
Setting detail: OUTPATIENT SURGERY
Discharge: HOME | End: 2023-06-21
Attending: INTERNAL MEDICINE
Payer: COMMERCIAL

## 2023-06-21 ENCOUNTER — ANESTHESIA EVENT (OUTPATIENT)
Dept: GASTROENTEROLOGY | Facility: AMBULARY SURGERY CENTER | Age: 63
End: 2023-06-21

## 2023-06-21 VITALS
DIASTOLIC BLOOD PRESSURE: 85 MMHG | OXYGEN SATURATION: 98 % | TEMPERATURE: 98.3 F | RESPIRATION RATE: 18 BRPM | HEART RATE: 80 BPM | SYSTOLIC BLOOD PRESSURE: 143 MMHG

## 2023-06-21 DIAGNOSIS — Z86.010 HISTORY OF COLON POLYPS: ICD-10-CM

## 2023-06-21 PROCEDURE — 45385 COLONOSCOPY W/LESION REMOVAL: CPT | Performed by: INTERNAL MEDICINE

## 2023-06-21 PROCEDURE — 88305 TISSUE EXAM BY PATHOLOGIST: CPT | Performed by: PATHOLOGY

## 2023-06-21 PROCEDURE — 45380 COLONOSCOPY AND BIOPSY: CPT | Performed by: INTERNAL MEDICINE

## 2023-06-21 RX ORDER — LIDOCAINE HYDROCHLORIDE 10 MG/ML
INJECTION, SOLUTION EPIDURAL; INFILTRATION; INTRACAUDAL; PERINEURAL AS NEEDED
Status: DISCONTINUED | OUTPATIENT
Start: 2023-06-21 | End: 2023-06-21

## 2023-06-21 RX ORDER — ONDANSETRON 2 MG/ML
4 INJECTION INTRAMUSCULAR; INTRAVENOUS ONCE AS NEEDED
Status: CANCELLED | OUTPATIENT
Start: 2023-06-21

## 2023-06-21 RX ORDER — PROPOFOL 10 MG/ML
INJECTION, EMULSION INTRAVENOUS AS NEEDED
Status: DISCONTINUED | OUTPATIENT
Start: 2023-06-21 | End: 2023-06-21

## 2023-06-21 RX ORDER — SODIUM CHLORIDE, SODIUM LACTATE, POTASSIUM CHLORIDE, CALCIUM CHLORIDE 600; 310; 30; 20 MG/100ML; MG/100ML; MG/100ML; MG/100ML
125 INJECTION, SOLUTION INTRAVENOUS CONTINUOUS
Status: CANCELLED | OUTPATIENT
Start: 2023-06-21

## 2023-06-21 RX ORDER — SODIUM CHLORIDE, SODIUM LACTATE, POTASSIUM CHLORIDE, CALCIUM CHLORIDE 600; 310; 30; 20 MG/100ML; MG/100ML; MG/100ML; MG/100ML
INJECTION, SOLUTION INTRAVENOUS CONTINUOUS PRN
Status: DISCONTINUED | OUTPATIENT
Start: 2023-06-21 | End: 2023-06-21

## 2023-06-21 RX ORDER — SODIUM CHLORIDE, SODIUM LACTATE, POTASSIUM CHLORIDE, CALCIUM CHLORIDE 600; 310; 30; 20 MG/100ML; MG/100ML; MG/100ML; MG/100ML
125 INJECTION, SOLUTION INTRAVENOUS CONTINUOUS
Status: DISCONTINUED | OUTPATIENT
Start: 2023-06-21 | End: 2023-06-25 | Stop reason: HOSPADM

## 2023-06-21 RX ADMIN — PROPOFOL 140 MG: 10 INJECTION, EMULSION INTRAVENOUS at 10:37

## 2023-06-21 RX ADMIN — SODIUM CHLORIDE, SODIUM LACTATE, POTASSIUM CHLORIDE, AND CALCIUM CHLORIDE: .6; .31; .03; .02 INJECTION, SOLUTION INTRAVENOUS at 10:35

## 2023-06-21 RX ADMIN — SODIUM CHLORIDE, SODIUM LACTATE, POTASSIUM CHLORIDE, AND CALCIUM CHLORIDE 125 ML/HR: .6; .31; .03; .02 INJECTION, SOLUTION INTRAVENOUS at 10:23

## 2023-06-21 RX ADMIN — PROPOFOL 50 MG: 10 INJECTION, EMULSION INTRAVENOUS at 10:42

## 2023-06-21 RX ADMIN — PROPOFOL 60 MG: 10 INJECTION, EMULSION INTRAVENOUS at 10:54

## 2023-06-21 RX ADMIN — LIDOCAINE HYDROCHLORIDE 50 MG: 10 INJECTION, SOLUTION EPIDURAL; INFILTRATION; INTRACAUDAL at 10:37

## 2023-06-21 RX ADMIN — PROPOFOL 50 MG: 10 INJECTION, EMULSION INTRAVENOUS at 10:48

## 2023-06-21 NOTE — ANESTHESIA POSTPROCEDURE EVALUATION
Post-Op Assessment Note    CV Status:  Stable  Pain Score: 0    Pain management: adequate     Mental Status:  Somnolent and sleepy   Hydration Status:  Euvolemic and stable   PONV Controlled:  Controlled   Airway Patency:  Patent      Post Op Vitals Reviewed: Yes      Staff: CRNA         No notable events documented      /63 (06/21/23 1059)    Temp      Pulse 66 (06/21/23 1059)   Resp 18 (06/21/23 1059)    SpO2 96 % (06/21/23 1059)

## 2023-06-21 NOTE — ANESTHESIA PREPROCEDURE EVALUATION
Procedure:  COLONOSCOPY    Relevant Problems   ANESTHESIA (within normal limits)      CARDIO   (+) Primary hypertension      GI/HEPATIC   (+) Gastroesophageal reflux disease without esophagitis      PULMONARY (within normal limits)        Physical Exam    Airway    Mallampati score: II    Neck ROM: full     Dental   No notable dental hx     Cardiovascular  Cardiovascular exam normal    Pulmonary  Pulmonary exam normal     Other Findings        Anesthesia Plan  ASA Score- 2     Anesthesia Type- IV sedation with anesthesia with ASA Monitors  Additional Monitors:   Airway Plan:           Plan Factors-Exercise tolerance (METS): >4 METS  Chart reviewed  EKG reviewed  Existing labs reviewed  Patient summary reviewed  Patient is not a current smoker  Induction-     Postoperative Plan-     Informed Consent- Anesthetic plan and risks discussed with patient  I personally reviewed this patient with the CRNA  Discussed and agreed on the Anesthesia Plan with the CRNA  Jeannette Arellano

## 2023-06-21 NOTE — H&P
History and Physical -  [facility]  Marija Gonzalez 61 y o  male MRN: [id number]    HPI: Marija Gonzalez is a 61y o  year old male who presents for colon cancer screening, has history of colon polyps  Review of Systems    Historical Information   Past Medical History:   Diagnosis Date   • Bradycardia 04/11/2019   • Calculus of gallbladder and bile duct with obstruction without cholecystitis    • Gallstone pancreatitis 04/10/2019    Added automatically from request for surgery [id number]   • GERD (gastroesophageal reflux disease)    • Hematemesis 04/11/2019   • Hypertension    • Pancreatitis     blocked by gall stones   • Wears glasses      Past Surgical History:   Procedure Laterality Date   • CHOLECYSTECTOMY      Lap   • CHOLECYSTECTOMY LAPAROSCOPIC N/A 4/15/2019    Procedure: CHOLECYSTECTOMY LAPAROSCOPIC;  Surgeon: Terrie Davis MD;  Location: [address] [place] [address];  Service: General   • EGD     • ERCP N/A 4/15/2019    Procedure: ENDOSCOPIC RETROGRADE CHOLANGIOPANCREATOGRAPHY (ERCP);   Surgeon: Terrie Davis MD;  Location: [address] [place] [address];  Service: General   • WISDOM TOOTH EXTRACTION      x4     Social History   Social History     Substance and Sexual Activity   Alcohol Use Yes    Comment: Rarely, every 3-4 months     Social History     Substance and Sexual Activity   Drug Use Not Currently     Social History     Tobacco Use   Smoking Status Never   Smokeless Tobacco Never     Family History   Problem Relation Age of Onset   • No Known Problems Mother    • No Known Problems Father    • No Known Problems Brother    • No Known Problems Brother        Meds/Allergies     (Not in a hospital admission)      Allergies   Allergen Reactions   • Azithromycin Diarrhea       Objective     BP (!) 182/82   Pulse 69   Temp 98 3 °F (36 8 °C) (Temporal)   Resp 18   SpO2 98%       PHYSICAL EXAM    Gen: NAD  CV: RRR  CHEST: Clear  ABD: soft, NT/ND  EXT: no edema  Neuro: AAO      ASSESSMENT/PLAN:  This is a 61 y o  year old male here for colon cancer screening, has history of colon polyps  PLAN:   Procedure: Colonoscopy

## 2023-06-22 ENCOUNTER — EVALUATION (OUTPATIENT)
Dept: PHYSICAL THERAPY | Facility: CLINIC | Age: 63
End: 2023-06-22
Payer: COMMERCIAL

## 2023-06-22 DIAGNOSIS — M75.42 IMPINGEMENT SYNDROME OF LEFT SHOULDER: ICD-10-CM

## 2023-06-22 DIAGNOSIS — S49.92XA SHOULDER INJURY, LEFT, INITIAL ENCOUNTER: Primary | ICD-10-CM

## 2023-06-22 PROCEDURE — 97530 THERAPEUTIC ACTIVITIES: CPT

## 2023-06-22 PROCEDURE — 97110 THERAPEUTIC EXERCISES: CPT

## 2023-06-22 NOTE — PROGRESS NOTES
"[facility] Office visit    Assessment/Plan:     1  Shoulder injury, left, subsequent encounter  Chronic, positive Wheeler test, presentation consistent with impingement    -     Ambulatory Referral to Sports Medicine; Future  - Return to office for shoulder injection    2  Stage 3a chronic kidney disease (HCC)  -     Basic metabolic panel; Future  -     Albumin / creatinine urine ratio; Future    3  Dyslipidemia  -     Lipid Panel with Direct LDL reflex; Future    4  Overweight (BMI 25 0-29 9)  -     HEMOGLOBIN A1C W/ EAG ESTIMATION; Future    5  Primary hypertension  /86, stable, currently on ramipril 5 mg daily    - Counseled on lifestyle modifications including diet and weight loss        Return in about 1 week (around 6/19/2023) for shoulder injection proceedure  Subjective:   BETY Taylor is a 61 y o  male who presents to follow-up on multiple issues including hypertension and chronic left shoulder pain  He continues to have left shoulder pain which is worsened with overhead movements and is not resolved with physical therapy  Review of Systems   Constitutional: Negative for chills and fever  Respiratory: Negative for shortness of breath  Cardiovascular: Negative for chest pain  Gastrointestinal: Negative for diarrhea, nausea and vomiting  Musculoskeletal: Positive for arthralgias (left shoulder pain)  Negative for neck pain  Neurological: Negative for headaches  Objective:     /86 (BP Location: Left arm, Patient Position: Sitting, Cuff Size: Large)   Pulse 73   Resp 16   Ht 6' 2\" (1 88 m)   Wt 99 3 kg (219 lb)   SpO2 97%   BMI 28 12 kg/m²      Physical Exam  Constitutional:       General: He is not in acute distress  Appearance: Normal appearance  He is obese  He is not ill-appearing, toxic-appearing or diaphoretic  HENT:      Head: Normocephalic  Cardiovascular:      Rate and Rhythm: Normal rate and regular rhythm        Heart sounds: " Normal heart sounds  No murmur heard  Pulmonary:      Effort: Pulmonary effort is normal       Breath sounds: Normal breath sounds  Musculoskeletal:      Comments: Left shoulder exam: positive connell, pain elicited with abducting arm overhead, strength and sensation intact   Neurological:      Mental Status: He is alert and oriented to person, place, and time            ** Please Note: This note has been constructed using a voice recognition system **     Hellen Carrillo MD  06/22/23  4:13 AM

## 2023-06-22 NOTE — PROGRESS NOTES
Daily Note     Today's date: 2023  Patient name: Rhonda Mackenzie  : 1960  MRN: [id number]  Referring provider: Quinn Boas, MD  Dx:   Encounter Diagnosis     ICD-10-CM    1  Shoulder injury, left, initial encounter  S49  92XA PT plan of care cert/re-cert      2  Impingement syndrome of left shoulder  M75 42 PT plan of care cert/re-cert          Start Time: 930  Stop Time: 958  Total time in clinic (min): 28 minutes    Subjective: Patient reports no change  Pain 0-6/10  He received an injection 2 days ago from sports med  Hurts more when he moves  Injection has not started helping yet  He took a week off of therapy and it felt a bit better at rest       Objective: See treatment diary below  Shoulder    2023    LEFT RIGHT LEFT   Flexion [facility] WFL*   Abduction WFL* [facility] WFL*   Internal Rotation [facility]   External Rotation [facility] WFL   *Indicates pain with testing  UE MMT   2023    LEFT RIGHT LEFT   Shoulder Shrug 5 5 5   Shoulder Abduction 4+ 5 4+*   Shoulder External Rotation 4+ 5 4+*   Shoulder Internal Rotation 5 5 4+*   Shoulder Flexion 5 5 5   Shoulder Extension 5 5          *Indicates pain with testing      Joint Play  - Anterior Capsule: Normal  - Posterior Capsule: Normal  - Inferior Capsule: Normal      Diagnostic Tests Performed  Positive: Painful Arc  Negative:  Drop Arm, Empty Can and Full Can    Repeated movements  Retraction in supine: decreasing, better  Retraction loaded: unable, better with wall TC  Retraction PTOP: decreasing, better  Retraction extension PTOP: decreasing, better    Assessment: Tolerated treatment well  Patient would benefit from continued PT to return to PLOF  ROM improved, strength improved and pain decreased with repeated movments testing, given as HEP for weekend to confirm  Patient demonstrates impairments in shoulder strength and pain free ROM  These impairments continue to limited the patient's ability to lift and reach overhead  STGs goals have been met, however patient has had a complicated episode of care as he was away at the start of the POC and then was reinjured before returning to the clinic  Previous POC focused on RTC strengthening and joint mobilizations, however in recent visits patient has failed to progress  On reassessment, remaining impairments appear to be coming from cervical origin as he is responding well to repeated movements  They require 8 visits, 2x/week over 4 weeks to address these limitations  Jin Acuña has demonstrated improvement in physical therapy and would continue to benefit from skilled PT to address the above impairments and return to PLOF  Goals  Short Term Goals (4 weeks - 5/25/2023):  MET  - Patient will be independent in basic HEP 2-3 weeks  - Patient will report >50% reduction in pain  - Patient will demonstrate >1/3 improvement in MMT grade as applicable  - Demonstrate consistent posture corrections without cueing during therapeutic exercise  - Patient will have full ROM    Long Term Goals (8 weeks - 6/22/2023): PROGRESSING - extended 4 weeks - 7/20/2023   - Patient will be independent in a comprehensive home exercise program  - Patient FOTO score will improve to 76/100  - Patient will self-report >80% improvement in function  - Patient will lift 25# overhead  - Patient will reach behind the seat in the front seat of the car    Plan: Continue per plan of care  Insurance:  AMA/CMS Eval/ Re-eval POC expires Littie Maze #/ Referral # Total units  Start date  Expiration date Extension  Visit limitation? PT only or  PT+OT?  Co-Insurance   [facility] 4/27/2023 8 weeks - 6/22/2023 66 Auth # [id number] approved 12 visits 4/27 7/31          75            6/22/23 sent                                               Date 4/27 5/1 5/23 5/25 5/30 6/1 6/5 6/7 6/13 6/15 6/22    Units:  Used 1 2 3 4 5 6 7 8 9 10 11    Authed:  Remaining  11 10 9 8 7 6 5 4 3 2 1      Patient provided verbal consent to treatment plan and recommended interventions      Date 6/1/23 6/5/23 6/7/23 6/13/23 6/15 6/22   Visit Number 6/12 7/12 8/12 9/12 10/12 11/12 RE   Manual         GH mobs Performed gr 3-4 Performed gr 3-4 Performed gr 3-4      STM Performed  Performed      Nerve glides   Median n glives 8'      Thoracic mobs    Gr 4 PA  Retraction x20  Retraction extension x20   Neuro Re-Ed         Scap retraction 3x15 RTB  3x15 3x15 3x15    Wall slide 3x10 light loop 2x10 light loop 3x10 3x10 3x10    Prone T 3x10 3x10       Prone Y x15 3x10       SL ER 3x10 3x10 3x10  3x10    SL abduction 3x10 3x10 3x10  3x10    TB row/sh ext 3x10 9# CC 3x10 9# CC 3x10 7 5# CC 3x10 7 5# CC 3x10 7 5# CC    TB ER 2x10, x4 4# CC, x10 2 5# 3x10 2 5# CC -      Face pull 2x10 4# CC 3x10 4# CC -      LT activation o pulleys  x20 x20      TRX Row    x12     TherEx         RM shoulder ext x30  RM horz add 3x10 retraction supine 2x50    Retraction loaded x10 2x50 retraction supine 2x50    Retraction loaded at wall x10   Body blade 3x30s elbow straight  3x30s elbow flexed      Pec stretch 5x15s 5x15s       Lat stretch  5x15s       Thoracic ext    x20 x20                      TherAct         Patient education    Posture 8'  Re-eval and MDT assessment 20'                                       Gait Training                                    Modalities         CP               Precautions: none  Past Medical History:   Diagnosis Date   • Bradycardia 04/11/2019   • Calculus of gallbladder and bile duct with obstruction without cholecystitis    • Gallstone pancreatitis 04/10/2019    Added automatically from request for surgery [id number]   • GERD (gastroesophageal reflux disease)    • Hematemesis 04/11/2019   • Hypertension    • Pancreatitis     blocked by gall stones   • Wears glasses

## 2023-06-23 ENCOUNTER — TELEPHONE (OUTPATIENT)
Dept: OBGYN CLINIC | Facility: CLINIC | Age: 63
End: 2023-06-23

## 2023-06-23 DIAGNOSIS — J98.4 LUNG ABNORMALITY: Primary | ICD-10-CM

## 2023-06-26 PROCEDURE — 88305 TISSUE EXAM BY PATHOLOGIST: CPT | Performed by: PATHOLOGY

## 2023-06-27 ENCOUNTER — HOSPITAL ENCOUNTER (OUTPATIENT)
Dept: RADIOLOGY | Facility: HOSPITAL | Age: 63
Discharge: HOME | End: 2023-06-27
Payer: COMMERCIAL

## 2023-06-27 ENCOUNTER — OFFICE VISIT (OUTPATIENT)
Dept: PHYSICAL THERAPY | Facility: CLINIC | Age: 63
End: 2023-06-27
Payer: COMMERCIAL

## 2023-06-27 DIAGNOSIS — J98.4 LUNG ABNORMALITY: ICD-10-CM

## 2023-06-27 DIAGNOSIS — S49.92XA SHOULDER INJURY, LEFT, INITIAL ENCOUNTER: Primary | ICD-10-CM

## 2023-06-27 DIAGNOSIS — J98.4 CHRONIC LUNG DISEASE: Primary | ICD-10-CM

## 2023-06-27 DIAGNOSIS — M75.42 IMPINGEMENT SYNDROME OF LEFT SHOULDER: ICD-10-CM

## 2023-06-27 PROCEDURE — 97140 MANUAL THERAPY 1/> REGIONS: CPT

## 2023-06-27 PROCEDURE — 71046 X-RAY EXAM CHEST 2 VIEWS: CPT

## 2023-06-27 NOTE — PROGRESS NOTES
Daily Note     Today's date: 2023  Patient name: Stephanie Mcelroy  : 1960  MRN: [id number]  Referring provider: Adam Euceda MD  Dx:   Encounter Diagnosis     ICD-10-CM    1  Shoulder injury, left, initial encounter  S49  92XA       2  Impingement syndrome of left shoulder  M75 42           Start Time: 1015  Stop Time: 1043  Total time in clinic (min): 28 minutes    Subjective: Patient thinks there has been some progress since focusing on his neck      Objective: See treatment diary below      Assessment: Tolerated treatment well  Patient demonstrated fatigue post treatment, exhibited good technique with therapeutic exercises and would benefit from continued PT  He was better able to find retraction and retraction-extension in loaded positioning for home  1:1 with Jocelyn Jeffries, PT, DPT for 15 min of tx, independent fitness program for remainder  Plan: Continue per plan of care  Insurance:  AMA/CMS Eval/ Re-eval POC expires Emmy Ulloa #/ Referral # Total units  Start date  Expiration date Extension  Visit limitation? PT only or  PT+OT? Co-Insurance   [facility], [facility] 2023 8 weeks - 2023 66 Auth # [id number] approved 12 visits           75            23 sent                                               Date 4/27 5/1 5/23 5/25 5/30 6/1 6/5 6/7 6/13 6/15 6/22 6/27   Units:  Used 1 2 3 4 5 6 7 8 9 10 11 12   Authed:  Remaining  11 10 9 8 7 6 5 4 3 2 1 0        Date               Units:  Used               Authed:  Remaining                  Patient provided verbal consent to treatment plan and recommended interventions      Date 6/5/23 6/7/23 6/13/23 6/15 6/22 6/27   Visit Number 7/12 8/12 9/12 10/12 11/12 RE    Manual         GH mobs Performed gr 3-4 Performed gr 3-4       STM  Performed       Nerve glides  Median n glives 8'       Thoracic mobs   Gr 4 PA  Retraction x20  Retraction extension x20 Retraction 2x20  Retraction extension 2x20   Neuro Re-Ed         Scap retraction 3x15 3x15 3x15     Wall slide 2x10 light loop 3x10 3x10 3x10     Prone T 3x10        Prone Y 3x10        SL ER 3x10 3x10  3x10     SL abduction 3x10 3x10  3x10     TB row/sh ext 3x10 9# CC 3x10 7 5# CC 3x10 7 5# CC 3x10 7 5# CC     TB ER 3x10 2 5# CC -       Face pull 3x10 4# CC -       LT activation o pulleys x20 x20       TRX Row   x12      TherEx           RM shoulder ext  RM horz add 3x10 retraction supine 2x50    Retraction loaded x10 2x50 retraction supine 2x50    Retraction loaded at wall x10 retraction supine 2x50    Retraction loaded at wall x20  Retraction extension x10   Body blade  3x30s elbow flexed       Pec stretch 5x15s        Lat stretch 5x15s        Thoracic ext   x20 x20                       TherAct         Patient education   Posture 8'  Re-eval and MDT assessment 20'                                        Gait Training                                    Modalities         CP               Precautions: none  Past Medical History:   Diagnosis Date   • Bradycardia 04/11/2019   • Calculus of gallbladder and bile duct with obstruction without cholecystitis    • Gallstone pancreatitis 04/10/2019    Added automatically from request for surgery [id number]   • GERD (gastroesophageal reflux disease)    • Hematemesis 04/11/2019   • Hypertension    • Pancreatitis     blocked by gall stones   • Wears glasses

## 2023-06-29 ENCOUNTER — NURSE TRIAGE (OUTPATIENT)
Age: 63
End: 2023-06-29

## 2023-06-29 ENCOUNTER — OFFICE VISIT (OUTPATIENT)
Dept: PHYSICAL THERAPY | Facility: CLINIC | Age: 63
End: 2023-06-29
Payer: COMMERCIAL

## 2023-06-29 DIAGNOSIS — M75.42 IMPINGEMENT SYNDROME OF LEFT SHOULDER: ICD-10-CM

## 2023-06-29 DIAGNOSIS — S49.92XA SHOULDER INJURY, LEFT, INITIAL ENCOUNTER: Primary | ICD-10-CM

## 2023-06-29 PROCEDURE — 97110 THERAPEUTIC EXERCISES: CPT

## 2023-06-29 PROCEDURE — 97140 MANUAL THERAPY 1/> REGIONS: CPT

## 2023-06-29 NOTE — TELEPHONE ENCOUNTER
Regarding: results  ----- Message from Vihsal Julio MA sent at 6/29/2023  4:30 PM EDT -----  Pt called asking for results of last colonoscopy

## 2023-06-29 NOTE — PROGRESS NOTES
Daily Note     Today's date: 2023  Patient name: Fabian Patton  : 1960  MRN: [id number]  Referring provider: Shraddha Morgan MD  Dx:   Encounter Diagnosis     ICD-10-CM    1  Shoulder injury, left, initial encounter  S49  92XA       2  Impingement syndrome of left shoulder  M75 42           Start Time: 1015  Stop Time: 1053  Total time in clinic (min): 38 minutes    Subjective: Patient thinks there has been some progress since focusing on his neck      Objective: See treatment diary below  Retraction extension PTOP - better flexion, no change abd  Rep rot to L - better abd  Rep ER at 90 deg flexion - absolved, given as HEP      Assessment: Tolerated treatment well  Patient demonstrated fatigue post treatment, exhibited good technique with therapeutic exercises and would benefit from continued PT  Discussed placing on hold as patient's progress has slowed pending further workup next week  Improvement in sx with all repeated movements, best results with ER at 90 deg flexion, given as HEP  Continue to monitor  1:1 with Mikala Siemens, PT, DPT for 23 min of tx, independent fitness program for remainder  Plan: Continue per plan of care  Refer back to referring provider re further work up      Insurance:  AMA/CMS Eval/ Re-eval POC expires Liana Dustysharonbethany #/ Referral # Total units  Start date  Expiration date Extension  Visit limitation? PT only or  PT+OT? Co-Insurance   [facility] 2023 8 weeks - 2023 66 Auth # [id number] approved 12 visits           75            23 sent                                               Date 4/27 5/1 5/23 5/25 5/30 6/1 6/5 6/7 6/13 6/15 6/22 6/27   Units:  Used 1 2 3 4 5 6 7 8 9 10 11 12   Authed:  Remaining  11 10 9 8 7 6 5 4 3 2 1 0        Date               Units:  Used 1              Authed:  Remaining  11                Patient provided verbal consent to treatment plan and recommended interventions      Date 6/7/23 6/13/23 6/15 6/22 6/27 6/29   Visit Number 8/12 9/12 10/12 11/12 RE 12/12 13   Manual         GH mobs Performed gr 3-4        STM Performed        Nerve glides Median n glives 8'        Thoracic mobs  Gr 4 PA  Retraction x20  Retraction extension x20 Retraction 2x20  Retraction extension 2x20 Retraction 2x20  Retraction extension 2x20   Neuro Re-Ed         Scap retraction 3x15 3x15 3x15      Wall slide 3x10 3x10 3x10      Prone T         Prone Y         SL ER 3x10  3x10      SL abduction 3x10  3x10      TB row/sh ext 3x10 7 5# CC 3x10 7 5# CC 3x10 7 5# CC      TB ER -        Face pull -        LT activation o pulleys x20        TRX Row  x12       TherEx           RM shoulder ext RM horz add 3x10 retraction supine 2x50    Retraction loaded x10 2x50 retraction supine 2x50    Retraction loaded at wall x10 retraction supine 2x50    Retraction loaded at wall x20  Retraction extension x10 retraction supine 2x50    Repeated rotation to L 2x50    RM 90 flexion and ER x20   Body blade 3x30s elbow flexed        Pec stretch         Lat stretch         Thoracic ext  x20 x20                        TherAct         Patient education  Posture 8'  Re-eval and MDT assessment 20'                                         Gait Training                                    Modalities         CP               Precautions: none  Past Medical History:   Diagnosis Date   • Bradycardia 04/11/2019   • Calculus of gallbladder and bile duct with obstruction without cholecystitis    • Gallstone pancreatitis 04/10/2019    Added automatically from request for surgery [id number]   • GERD (gastroesophageal reflux disease)    • Hematemesis 04/11/2019   • Hypertension    • Pancreatitis     blocked by gall stones   • Wears glasses

## 2023-06-30 NOTE — TELEPHONE ENCOUNTER
Informed patient of results, patient verbalized understanding  Advised to call with any questions , concerns , or worsening of symptoms

## 2023-07-01 ENCOUNTER — HOSPITAL ENCOUNTER (OUTPATIENT)
Dept: RADIOLOGY | Facility: HOSPITAL | Age: 63
Discharge: HOME | End: 2023-07-01
Payer: COMMERCIAL

## 2023-07-01 DIAGNOSIS — J98.4 CHRONIC LUNG DISEASE: ICD-10-CM

## 2023-07-01 PROCEDURE — G1004 CDSM NDSC: HCPCS

## 2023-07-01 PROCEDURE — 71250 CT THORAX DX C-: CPT

## 2023-07-06 ENCOUNTER — OFFICE VISIT (OUTPATIENT)
Dept: PHYSICAL THERAPY | Facility: CLINIC | Age: 63
End: 2023-07-06
Payer: COMMERCIAL

## 2023-07-06 DIAGNOSIS — M75.42 IMPINGEMENT SYNDROME OF LEFT SHOULDER: ICD-10-CM

## 2023-07-06 DIAGNOSIS — S49.92XA SHOULDER INJURY, LEFT, INITIAL ENCOUNTER: Primary | ICD-10-CM

## 2023-07-06 PROCEDURE — 97110 THERAPEUTIC EXERCISES: CPT

## 2023-07-06 PROCEDURE — 97140 MANUAL THERAPY 1/> REGIONS: CPT

## 2023-07-06 NOTE — PROGRESS NOTES
Daily Note     Today's date: 2023  Patient name: Vance Tate  : 1960  MRN: [id number]  Referring provider: Tristin Eduardo MD  Dx:   Encounter Diagnosis     ICD-10-CM    1. Shoulder injury, left, initial encounter  S49. 92XA       2. Impingement syndrome of left shoulder  M75.42           Start Time: 1545  Stop Time: 1623  Total time in clinic (min): 38 minutes    Subjective: Patient reports limited progress, he has minimal pain flexion and scaption but pain with abduction. Objective: See treatment diary below  Rep ER at 90 deg flexion - no effect on abduction      Assessment: Tolerated treatment well. Patient demonstrated fatigue post treatment, exhibited good technique with therapeutic exercises and would benefit from continued PT. Discussed placing on hold as patient's progress has slowed pending further workup next week. Focused on thoracic mobility today. Plan: Place on hold. Refer back to referring provider re further work up      Insurance:  AMA/CMS Eval/ Re-eval POC expires Prasad Liter #/ Referral # Total units  Start date  Expiration date Extension  Visit limitation? PT only or  PT+OT? Co-Insurance   [facility], [facility] 2023 8 weeks - 2023 66 Auth # [id number] approved 12 visits           75            23 sent                                               Date 4/27 5/1 5/23 5/25 5/30 6/1 6/5 6/7 6/13 6/15 6/22 6/27   Units:  Used 1 2 3 4 5 6 7 8 9 10 11 12   Authed:  Remaining  11 10 9 8 7 6 5 4 3 2 1 0        Date              Units:  Used 1 1             Authed:  Remaining  11 10               Patient provided verbal consent to treatment plan and recommended interventions.     Date 6/13/23 6/15 6/22 6/27 6/29 7   Visit Number 9/12 10/12 11/12 RE  13 14   Manual         GH mobs      Prone PA thoracic spine gr 4 8'   STM         Nerve glides         Thoracic mobs Gr 4 PA  Retraction x20  Retraction extension x20 Retraction 2x20  Retraction extension 2x20 Retraction 2x20  Retraction extension 2x20    Neuro Re-Ed         Scap retraction 3x15 3x15    3x10   Wall slide 3x10 3x10       Prone T         Prone Y         SL ER  3x10       SL abduction  3x10       TB row/sh ext 3x10 7.5# CC 3x10 7.5# CC       TB ER         Face pull         LT activation o pulleys         TRX Row x12        TherEx           RM shoulder ext retraction supine 2x50    Retraction loaded x10 2x50 retraction supine 2x50    Retraction loaded at wall x10 retraction supine 2x50    Retraction loaded at wall x20  Retraction extension x10 retraction supine 2x50    Repeated rotation to L 2x50    RM 90 flexion and ER x20 RM 90 flexion and ER 2x20   Body blade         Pec stretch      10x10s   Lat stretch      10x10s   Thoracic ext x20 x20    x20-30   Open book      10 ea            TherAct         Patient education Posture 8'  Re-eval and MDT assessment 20'                                          Gait Training                                    Modalities         CP               Precautions: none  Past Medical History:   Diagnosis Date   • Bradycardia 04/11/2019   • Calculus of gallbladder and bile duct with obstruction without cholecystitis    • Gallstone pancreatitis 04/10/2019    Added automatically from request for surgery [id number]   • GERD (gastroesophageal reflux disease)    • Hematemesis 04/11/2019   • Hypertension    • Pancreatitis     blocked by gall stones   • Wears glasses

## 2023-07-08 DIAGNOSIS — J98.4 PULMONARY OSSIFICATION: Primary | ICD-10-CM

## 2023-07-10 ENCOUNTER — OFFICE VISIT (OUTPATIENT)
Dept: OBGYN CLINIC | Facility: CLINIC | Age: 63
End: 2023-07-10
Payer: COMMERCIAL

## 2023-07-10 ENCOUNTER — APPOINTMENT (OUTPATIENT)
Dept: RADIOLOGY | Facility: CLINIC | Age: 63
End: 2023-07-10
Payer: COMMERCIAL

## 2023-07-10 VITALS
SYSTOLIC BLOOD PRESSURE: 158 MMHG | HEART RATE: 81 BPM | WEIGHT: 220 LBS | DIASTOLIC BLOOD PRESSURE: 88 MMHG | BODY MASS INDEX: 28.23 KG/M2 | HEIGHT: 74 IN

## 2023-07-10 DIAGNOSIS — M54.2 NECK PAIN: ICD-10-CM

## 2023-07-10 DIAGNOSIS — M24.812 INTERNAL DERANGEMENT OF LEFT SHOULDER: Primary | ICD-10-CM

## 2023-07-10 PROCEDURE — 99213 OFFICE O/P EST LOW 20 MIN: CPT | Performed by: ORTHOPAEDIC SURGERY

## 2023-07-10 PROCEDURE — 72040 X-RAY EXAM NECK SPINE 2-3 VW: CPT

## 2023-07-10 NOTE — PROGRESS NOTES
Assessment/Plan:  1. Internal derangement of left shoulder  MRI shoulder left wo contrast      2. Neck pain  XR spine cervical 2 or 3 vw injury        Tyler James has continued left shoulder pain and symptoms consistent with shoulder impingement and rotator cuff pathology. While he does have some reproducible neck pain he does not have reproducible radiculopathy and any signs of weakness or numbness in the upper extremities. His cervical x-ray today shows mild degenerative changes at C5-6 but no other abnormality. I would like an MRI of his left shoulder for further evaluation to rule out rotator cuff pathology at this time. If his shoulder MRI does not show any clear cause then further work-up of his neck could be considered. He may continue with physical therapy at this time. Follow-up after MRI. Subjective:   Magen Hernandez is a 61 y.o. male who presents to the office for follow-up for left-sided shoulder pain. He initially presented to the office 3 weeks ago with increased discomfort in the left shoulder after moving a crate. He had already done physical therapy for several weeks and we had him continue with therapy and undergo a left subacromial cortisone injection. He states the injection only gave him about 10 to 15% of relief and has not significantly helped. He is continue with physical therapy and therapy has also been working on his cervical spine to see if this helps with the pain. He continues to have aching throbbing pain in the left shoulder that worsens with lifting or overhead movement. Does have occasional neck pain but not as severe as the shoulder and he is unsure if they are related. Review of Systems   Constitutional: Negative for chills, fever and unexpected weight change. HENT: Negative for hearing loss, nosebleeds and sore throat. Eyes: Negative for pain, redness and visual disturbance. Respiratory: Negative for cough, shortness of breath and wheezing.     Cardiovascular: Negative for chest pain, palpitations and leg swelling. Gastrointestinal: Negative for abdominal pain, nausea and vomiting. Endocrine: Negative for polyphagia and polyuria. Genitourinary: Negative for dysuria and hematuria. Musculoskeletal:        See HPI   Skin: Negative for rash and wound. Neurological: Negative for dizziness, numbness and headaches. Psychiatric/Behavioral: Negative for decreased concentration and suicidal ideas. The patient is not nervous/anxious. Past Medical History:   Diagnosis Date   • Bradycardia 04/11/2019   • Calculus of gallbladder and bile duct with obstruction without cholecystitis    • Gallstone pancreatitis 04/10/2019    Added automatically from request for surgery [id number]   • GERD (gastroesophageal reflux disease)    • Hematemesis 04/11/2019   • Hypertension    • Pancreatitis     blocked by gall stones   • Wears glasses        Past Surgical History:   Procedure Laterality Date   • CHOLECYSTECTOMY      Lap   • CHOLECYSTECTOMY LAPAROSCOPIC N/A 4/15/2019    Procedure: CHOLECYSTECTOMY LAPAROSCOPIC;  Surgeon: Kristin Kothari MD;  Location: [facility];  Service: General   • EGD     • ERCP N/A 4/15/2019    Procedure: ENDOSCOPIC RETROGRADE CHOLANGIOPANCREATOGRAPHY (ERCP);   Surgeon: Kristin Kothari MD;  Location: [facility];  Service: General   • WISDOM TOOTH EXTRACTION      x4       Family History   Problem Relation Age of Onset   • No Known Problems Mother    • No Known Problems Father    • No Known Problems Brother    • No Known Problems Brother        Social History     Occupational History   • Not on file   Tobacco Use   • Smoking status: Never   • Smokeless tobacco: Never   Vaping Use   • Vaping Use: Never used   Substance and Sexual Activity   • Alcohol use: Yes     Comment: Rarely, every 3-4 months   • Drug use: Not Currently   • Sexual activity: Not Currently     Partners: Male         Current Outpatient Medications:   •  ascorbic acid (VITAMIN C) 1000 MG tablet, Take 1,000 mg by mouth as needed , Disp: , Rfl:   •  B COMPLEX-BIOTIN-FA PO, Take by mouth 3 (three) times a week , Disp: , Rfl:   •  Blood Pressure Monitoring (B-D ASSURE BPM/AUTO ARM CUFF) MISC, Check BP regularly, Disp: 1 each, Rfl: 0  •  emtricitabine-tenofovir (TRUVADA) 200-300 mg per tablet, , Disp: , Rfl:   •  famotidine (PEPCID) 20 mg tablet, Take 1 tablet (20 mg total) by mouth daily as needed for indigestion or heartburn, Disp: 30 tablet, Rfl: 5  •  multivitamin (THERAGRAN) TABS, Take 1 tablet by mouth 2 (two) times a week , Disp: , Rfl:   •  Probiotic Product (PROBIOTIC ADVANCED) CAPS, Take by mouth as needed , Disp: , Rfl:   •  ramipril (ALTACE) 5 mg capsule, Take 1 capsule (5 mg total) by mouth daily, Disp: 90 capsule, Rfl: 1  •  tadalafil (CIALIS) 5 MG tablet, , Disp: , Rfl:   •  polyethylene glycol (GOLYTELY) 4000 mL solution, Take 4,000 mL by mouth once for 1 dose, Disp: 4000 mL, Rfl: 0  •  Potassium Bicarbonate 99 MG CAPS, Take by mouth as needed  (Patient not taking: Reported on 6/20/2023), Disp: , Rfl:     Allergies   Allergen Reactions   • Azithromycin Diarrhea       Objective:  Vitals:    07/10/23 1018   BP: 158/88   Pulse: 81            Left Shoulder Exam     Tenderness   The patient is experiencing no tenderness. Range of Motion   Active abduction:  140 abnormal   Passive abduction:  150 abnormal   Extension: normal   External rotation: normal   Forward flexion:  160 abnormal     Muscle Strength   Abduction: 4/5   Internal rotation: 5/5   External rotation: 5/5   Supraspinatus: 5/5   Subscapularis: 5/5   Biceps: 5/5     Tests   Wheeler test: positive  Impingement: positive  Drop arm: negative    Other   Erythema: absent  Sensation: normal  Pulse: present             Physical Exam  Vitals and nursing note reviewed. Constitutional:       Appearance: Normal appearance. He is well-developed. HENT:      Head: Normocephalic and atraumatic.       Right Ear: External ear normal. Left Ear: External ear normal.      Nose: Nose normal.   Eyes:      General: No scleral icterus. Extraocular Movements: Extraocular movements intact. Conjunctiva/sclera: Conjunctivae normal.   Neck:        Comments: Mild tenderness palpation over left facet joint around C5-6    Negative Spurling's maneuver bilaterally. Full strength and sensation bilateral extremities  Cardiovascular:      Rate and Rhythm: Normal rate. Pulmonary:      Effort: Pulmonary effort is normal. No respiratory distress. Musculoskeletal:      Cervical back: Normal range of motion and neck supple. No spinous process tenderness or muscular tenderness. Normal range of motion. Comments: See Ortho exam   Skin:     General: Skin is warm and dry. Neurological:      General: No focal deficit present. Mental Status: He is alert and oriented to person, place, and time. Psychiatric:         Behavior: Behavior normal.         I have personally reviewed pertinent films in PACS and my interpretation is as follows:  X-rays of the cervical spine demonstrate mild degenerative changes at C5-6, no acute pathology. This document was created using speech voice recognition software. Grammatical errors, random word insertions, pronoun errors, and incomplete sentences are an occasional consequence of this system due to software limitations, ambient noise, and hardware issues. Any formal questions or concerns about content, text, or information contained within the body of this dictation should be directly addressed to the provider for clarification.

## 2023-07-12 ENCOUNTER — HOSPITAL ENCOUNTER (OUTPATIENT)
Dept: NON INVASIVE DIAGNOSTICS | Facility: HOSPITAL | Age: 63
Discharge: HOME | End: 2023-07-12
Payer: COMMERCIAL

## 2023-07-12 VITALS
WEIGHT: 220 LBS | BODY MASS INDEX: 28.23 KG/M2 | HEIGHT: 74 IN | HEART RATE: 81 BPM | SYSTOLIC BLOOD PRESSURE: 158 MMHG | DIASTOLIC BLOOD PRESSURE: 88 MMHG

## 2023-07-12 DIAGNOSIS — J98.4 PULMONARY OSSIFICATION: ICD-10-CM

## 2023-07-12 PROCEDURE — 93306 TTE W/DOPPLER COMPLETE: CPT

## 2023-07-13 LAB
AORTIC ROOT: 3.9 CM
APICAL FOUR CHAMBER EJECTION FRACTION: 60 %
AV LVOT PEAK GRADIENT: 4 MMHG
AV PEAK GRADIENT: 9 MMHG
DOP CALC LVOT AREA: 3.8 CM2
DOP CALC LVOT DIAMETER: 2.2 CM
E WAVE DECELERATION TIME: 256 MS
FRACTIONAL SHORTENING: 30 (ref 28–44)
INTERVENTRICULAR SEPTUM IN DIASTOLE (PARASTERNAL SHORT AXIS VIEW): 1.2 CM
INTERVENTRICULAR SEPTUM: 1.2 CM (ref 0.6–1.1)
LAAS-AP2: 21.2 CM2
LAAS-AP4: 22.3 CM2
LEFT ATRIUM SIZE: 3.4 CM
LEFT ATRIUM VOLUME (MOD BIPLANE): 64 ML
LEFT INTERNAL DIMENSION IN SYSTOLE: 2.6 CM (ref 2.1–4)
LEFT VENTRICULAR INTERNAL DIMENSION IN DIASTOLE: 3.7 CM (ref 3.5–6)
LEFT VENTRICULAR POSTERIOR WALL IN END DIASTOLE: 1 CM
LEFT VENTRICULAR STROKE VOLUME: 35 ML
LVSV (TEICH): 35 ML
MV E'TISSUE VEL-LAT: 4 CM/S
MV E'TISSUE VEL-SEP: 6 CM/S
MV PEAK A VEL: 0.84 M/S
MV PEAK E VEL: 61 CM/S
MV STENOSIS PRESSURE HALF TIME: 74 MS
MV VALVE AREA P 1/2 METHOD: 2.97
RIGHT ATRIUM AREA SYSTOLE A4C: 15.6 CM2
RIGHT VENTRICLE ID DIMENSION: 3.7 CM
SL CV LEFT ATRIUM LENGTH A2C: 5.5 CM
SL CV LV EF: 59
SL CV PED ECHO LEFT VENTRICLE DIASTOLIC VOLUME (MOD BIPLANE) 2D: 59 ML
SL CV PED ECHO LEFT VENTRICLE SYSTOLIC VOLUME (MOD BIPLANE) 2D: 24 ML
TR MAX PG: 6 MMHG
TR PEAK VELOCITY: 1.3 M/S
TRICUSPID ANNULAR PLANE SYSTOLIC EXCURSION: 2.1 CM
TRICUSPID VALVE PEAK REGURGITATION VELOCITY: 1.25 M/S

## 2023-07-13 PROCEDURE — 93306 TTE W/DOPPLER COMPLETE: CPT | Performed by: INTERNAL MEDICINE

## 2023-07-18 ENCOUNTER — HOSPITAL ENCOUNTER (OUTPATIENT)
Dept: PULMONOLOGY | Facility: HOSPITAL | Age: 63
Discharge: HOME | End: 2023-07-18
Payer: COMMERCIAL

## 2023-07-18 DIAGNOSIS — J98.4 PULMONARY OSSIFICATION: ICD-10-CM

## 2023-07-18 PROCEDURE — 94729 DIFFUSING CAPACITY: CPT | Performed by: INTERNAL MEDICINE

## 2023-07-18 PROCEDURE — 94727 GAS DIL/WSHOT DETER LNG VOL: CPT | Performed by: INTERNAL MEDICINE

## 2023-07-18 PROCEDURE — 94060 EVALUATION OF WHEEZING: CPT

## 2023-07-18 PROCEDURE — 94760 N-INVAS EAR/PLS OXIMETRY 1: CPT

## 2023-07-18 PROCEDURE — 94729 DIFFUSING CAPACITY: CPT

## 2023-07-18 PROCEDURE — 94060 EVALUATION OF WHEEZING: CPT | Performed by: INTERNAL MEDICINE

## 2023-07-18 RX ORDER — ALBUTEROL SULFATE 2.5 MG/3ML
2.5 SOLUTION RESPIRATORY (INHALATION) ONCE
Status: COMPLETED | OUTPATIENT
Start: 2023-07-18 | End: 2023-07-18

## 2023-07-18 RX ADMIN — ALBUTEROL SULFATE 2.5 MG: 2.5 SOLUTION RESPIRATORY (INHALATION) at 10:06

## 2023-07-19 ENCOUNTER — APPOINTMENT (OUTPATIENT)
Dept: LAB | Facility: CLINIC | Age: 63
End: 2023-07-19
Payer: COMMERCIAL

## 2023-07-19 DIAGNOSIS — J98.4 PULMONARY OSSIFICATION: ICD-10-CM

## 2023-07-19 LAB
25(OH)D3 SERPL-MCNC: 25.5 NG/ML (ref 30–100)
CA-I BLD-SCNC: 1.19 MMOL/L (ref 1.12–1.32)
CRP SERPL QL: <3 MG/L
ERYTHROCYTE [SEDIMENTATION RATE] IN BLOOD: 7 MM/HOUR (ref 0–19)

## 2023-07-19 PROCEDURE — 86140 C-REACTIVE PROTEIN: CPT

## 2023-07-19 PROCEDURE — 87389 HIV-1 AG W/HIV-1&-2 AB AG IA: CPT

## 2023-07-19 PROCEDURE — 86038 ANTINUCLEAR ANTIBODIES: CPT

## 2023-07-19 PROCEDURE — 86430 RHEUMATOID FACTOR TEST QUAL: CPT

## 2023-07-19 PROCEDURE — 36415 COLL VENOUS BLD VENIPUNCTURE: CPT

## 2023-07-19 PROCEDURE — 82330 ASSAY OF CALCIUM: CPT

## 2023-07-19 PROCEDURE — 82306 VITAMIN D 25 HYDROXY: CPT

## 2023-07-19 PROCEDURE — 85652 RBC SED RATE AUTOMATED: CPT

## 2023-07-19 PROCEDURE — 82310 ASSAY OF CALCIUM: CPT

## 2023-07-20 LAB
ANA SER QL IA: NEGATIVE
CALCIUM SERPL-MCNC: 9.7 MG/DL (ref 8.3–10.1)
HIV 1+2 AB+HIV1 P24 AG SERPL QL IA: NORMAL
HIV 2 AB SERPL QL IA: NORMAL
HIV1 AB SERPL QL IA: NORMAL
HIV1 P24 AG SERPL QL IA: NORMAL
RHEUMATOID FACT SER QL LA: NEGATIVE

## 2023-07-28 DIAGNOSIS — H91.92 HEARING LOSS OF LEFT EAR, UNSPECIFIED HEARING LOSS TYPE: ICD-10-CM

## 2023-07-28 DIAGNOSIS — H93.12 TINNITUS OF LEFT EAR: Primary | ICD-10-CM

## 2023-07-31 ENCOUNTER — OFFICE VISIT (OUTPATIENT)
Dept: PHYSICAL THERAPY | Facility: CLINIC | Age: 63
End: 2023-07-31
Payer: COMMERCIAL

## 2023-07-31 DIAGNOSIS — S49.92XA SHOULDER INJURY, LEFT, INITIAL ENCOUNTER: Primary | ICD-10-CM

## 2023-07-31 DIAGNOSIS — M75.42 IMPINGEMENT SYNDROME OF LEFT SHOULDER: ICD-10-CM

## 2023-07-31 PROCEDURE — 97112 NEUROMUSCULAR REEDUCATION: CPT

## 2023-07-31 PROCEDURE — 97110 THERAPEUTIC EXERCISES: CPT

## 2023-07-31 PROCEDURE — 97530 THERAPEUTIC ACTIVITIES: CPT

## 2023-07-31 NOTE — PROGRESS NOTES
Daily Note     Today's date: 2023  Patient name: Nica Ferguson  : 1960  MRN: [id number]  Referring provider: Ashia Howard MD  Dx:   Encounter Diagnosis     ICD-10-CM    1. Shoulder injury, left, initial encounter  S49. 92XA PT plan of care cert/re-cert      2. Impingement syndrome of left shoulder  M75.42 PT plan of care cert/re-cert          Start Time: 1015  Stop Time: 1055  Total time in clinic (min): 40 minutes    Subjective: Patient reports increased pain while being away from the clinic. He "tweaked" it again yesterday while making a quick to movement to grab something. Objective: See treatment diary below  Shoulder    2023    LEFT RIGHT LEFT LEFT   Flexion [facility] WFL* WFL*   Abduction WFL* WFL WFL* WFL*   Internal Rotation [facility] WFL WFL*   External Rotation WFL [facility] WFL WFL   *Indicates pain with testing  UE MMT   2023    LEFT RIGHT LEFT LEFT   Shoulder Shrug 5 5 5 5   Shoulder Abduction/ 4+ 5 4+* 5*   Shoulder External Rotation 4+ 5 4+* 4+*   Shoulder Internal Rotation 5 5 4+* 4   Shoulder Flexion 5 5 5 5   Shoulder Extension 5 5            *Indicates pain with testing      Joint Play  - Anterior Capsule: hypomobile, painful    Palpation  - TTP: RTC tendons    Diagnostic Tests Performed  Positive: Wheeler, Empty Can and Full Can  Negative: Painful Arc, Drop Arm      Assessment: Tolerated treatment well. Patient demonstrated fatigue post treatment, exhibited good technique with therapeutic exercises and would benefit from continued PT. Patient demonstrates regression since being away from the clinic, including increased pain and decreased joint mobility. Focused on gentle AROM and isometrics in efforts to decrease hypertonicity and pain pending MRI. HEP updated to reflect current POC.     Goals  MET Goals     - Patient will be independent in basic HEP 2-3 weeks  - Patient will report >50% reduction in pain  - Patient will demonstrate >1/3 improvement in MMT grade as applicable  - Demonstrate consistent posture corrections without cueing during therapeutic exercise  - Patient will have full ROM  - Patient FOTO score will improve to 76/100    PROGRESSING Goals   - Patient will be independent in a comprehensive home exercise program  - Patient will self-report >80% improvement in function  - Patient will lift 15# overhead  - Patient will reach behind the seat in the front seat of the car    Plan: Continue with POC pending MRI      Insurance:  AMA/CMS Eval/ Re-eval POC expires Johnnie Stevens #/ Referral # Total units  Start date  Expiration date Extension  Visit limitation? PT only or  PT+OT? Co-Insurance   [facility], [facility] 4/27/2023 8 weeks - 6/22/2023 66 Auth # [id number] approved 12 visits 4/27 7/31          75            6/22/23 sent             7/31 sent                                 Date 4/27 5/1 5/23 5/25 5/30 6/1 6/5 6/7 6/13 6/15 6/22 6/27   Units:  Used 1 2 3 4 5 6 7 8 9 10 11 12   Authed:  Remaining  11 10 9 8 7 6 5 4 3 2 1 0        Date 6/29 7/6 7/31            Units:  Used 1 1 1            Authed:  Remaining  11 10 9              Patient provided verbal consent to treatment plan and recommended interventions.     Date 6/15 6/22 6/27 6/29 7/6 7/31   Visit Number 10/12 11/12 RE 12/12 13 14 15   Manual         GH mobs     Prone PA thoracic spine gr 4 8' AP gr 3-4 5'   STM         Nerve glides         Thoracic mobs  Retraction x20  Retraction extension x20 Retraction 2x20  Retraction extension 2x20 Retraction 2x20  Retraction extension 2x20     Neuro Re-Ed         Scap retraction 3x15    3x10 3x15   Wall slide 3x10     3x10   5 way isometrics      5s hold x10 ea   Prone T         Prone Y         SL ER 3x10        SL abduction 3x10        TB row/sh ext 3x10 7.5# CC        TB ER         Face pull         LT activation o pulleys         TRX Row         TherEx           RM shoulder ext 2x50 retraction supine 2x50    Retraction loaded at wall x10 retraction supine 2x50    Retraction loaded at wall x20  Retraction extension x10 retraction supine 2x50    Repeated rotation to L 2x50    RM 90 flexion and ER x20 RM 90 flexion and ER 2x20    Body blade         Pec stretch     10x10s    Lat stretch     10x10s    Thoracic ext x20    x20-30 3x10 + pec stretch   Open book     10 ea Standing 2x10 ea            TherAct         Patient education  Re-eval and MDT assessment 20'    Re-eval 10'                                       Gait Training                                    Modalities         CP               Precautions: none  Past Medical History:   Diagnosis Date   • Bradycardia 04/11/2019   • Calculus of gallbladder and bile duct with obstruction without cholecystitis    • Gallstone pancreatitis 04/10/2019    Added automatically from request for surgery [id number]   • GERD (gastroesophageal reflux disease)    • Hematemesis 04/11/2019   • Hypertension    • Pancreatitis     blocked by gall stones   • Wears glasses

## 2023-08-03 ENCOUNTER — OFFICE VISIT (OUTPATIENT)
Dept: PHYSICAL THERAPY | Facility: CLINIC | Age: 63
End: 2023-08-03
Payer: COMMERCIAL

## 2023-08-03 ENCOUNTER — HOSPITAL ENCOUNTER (OUTPATIENT)
Dept: RADIOLOGY | Facility: HOSPITAL | Age: 63
Discharge: HOME | End: 2023-08-03
Attending: ORTHOPAEDIC SURGERY
Payer: COMMERCIAL

## 2023-08-03 DIAGNOSIS — S49.92XA SHOULDER INJURY, LEFT, INITIAL ENCOUNTER: Primary | ICD-10-CM

## 2023-08-03 DIAGNOSIS — M24.812 INTERNAL DERANGEMENT OF LEFT SHOULDER: ICD-10-CM

## 2023-08-03 DIAGNOSIS — M75.42 IMPINGEMENT SYNDROME OF LEFT SHOULDER: ICD-10-CM

## 2023-08-03 PROCEDURE — 97110 THERAPEUTIC EXERCISES: CPT

## 2023-08-03 PROCEDURE — 97112 NEUROMUSCULAR REEDUCATION: CPT

## 2023-08-03 PROCEDURE — G1004 CDSM NDSC: HCPCS

## 2023-08-03 PROCEDURE — 97140 MANUAL THERAPY 1/> REGIONS: CPT

## 2023-08-03 PROCEDURE — 73221 MRI JOINT UPR EXTREM W/O DYE: CPT

## 2023-08-03 NOTE — PROGRESS NOTES
Daily Note     Today's date: 8/3/2023  Patient name: Mayra Chisholm  : 1960  MRN: [id number]  Referring provider: Fay Torres MD  Dx:   Encounter Diagnosis     ICD-10-CM    1. Shoulder injury, left, initial encounter  S49. 92XA       2. Impingement syndrome of left shoulder  M75.42           Start Time: 1458  Stop Time: 1538  Total time in clinic (min): 40 minutes    Subjective: Patient reports no change in status, he has been painting. Objective: See treatment diary below      Assessment: Tolerated treatment well. Patient demonstrated fatigue post treatment, exhibited good technique with therapeutic exercises and would benefit from continued PT. Introduced gentle RTC ROM and scapular stabilization    Plan: Continue with POC pending MRI      Insurance:  AMA/CMS Eval/ Re-eval POC expires Gita Plants #/ Referral # Total units  Start date  Expiration date Extension  Visit limitation? PT only or  PT+OT? Co-Insurance   [facility], [facility] 2023 8 weeks - 2023 66 Auth # [id number] approved 12 visits           75            23 sent              sent                                 Date 4/27 5/1 5/23 5/25 5/30 6/1 6/5 6/7 6/13 6/15 6/22 6/27   Units:  Used 1 2 3 4 5 6 7 8 9 10 11 12   Authed:  Remaining  11 10 9 8 7 6 5 4 3 2 1 0        Date /3           Units:  Used 1 1 1 1           Authed:  Remaining  11 10 9 8             Patient provided verbal consent to treatment plan and recommended interventions.     Date  8/3   Visit Number  RE  13 14 15 RE 16   Manual         GH mobs    Prone PA thoracic spine gr 4 8' AP gr 3-4 5' Prone PA thoracic spine gr 4 8'   STM         Nerve glides         Thoracic mobs Retraction x20  Retraction extension x20 Retraction 2x20  Retraction extension 2x20 Retraction 2x20  Retraction extension 2x20      Neuro Re-Ed         Scap retraction    3x10 3x15 3x15   Wall slide     3x10 3x10   5 way isometrics     5s hold x10 ea 5s hold x10 ea   Prone T         Prone Y         SL ER      3x10   SL abduction         TB row/sh ext      GTB 3x10   TB ER         Face pull         LT activation o pulleys         TRX Row         TherEx           RM shoulder ext retraction supine 2x50    Retraction loaded at wall x10 retraction supine 2x50    Retraction loaded at wall x20  Retraction extension x10 retraction supine 2x50    Repeated rotation to L 2x50    RM 90 flexion and ER x20 RM 90 flexion and ER 2x20     Body blade         Pec stretch    10x10s     Lat stretch    10x10s     Thoracic ext    x20-30 3x10 + pec stretch Thoracic series of 5 - 1' ea   Open book    10 ea Standing 2x10 ea 15 ea            TherAct         Patient education Re-eval and MDT assessment 20'    Re-eval 10'                                        Gait Training                                    Modalities         CP               Precautions: none  Past Medical History:   Diagnosis Date   • Bradycardia 04/11/2019   • Calculus of gallbladder and bile duct with obstruction without cholecystitis    • Gallstone pancreatitis 04/10/2019    Added automatically from request for surgery [id number]   • GERD (gastroesophageal reflux disease)    • Hematemesis 04/11/2019   • Hypertension    • Pancreatitis     blocked by gall stones   • Wears glasses

## 2023-08-07 ENCOUNTER — OFFICE VISIT (OUTPATIENT)
Dept: PHYSICAL THERAPY | Facility: CLINIC | Age: 63
End: 2023-08-07
Payer: COMMERCIAL

## 2023-08-07 DIAGNOSIS — S49.92XA SHOULDER INJURY, LEFT, INITIAL ENCOUNTER: Primary | ICD-10-CM

## 2023-08-07 DIAGNOSIS — M75.42 IMPINGEMENT SYNDROME OF LEFT SHOULDER: ICD-10-CM

## 2023-08-07 PROCEDURE — 97110 THERAPEUTIC EXERCISES: CPT

## 2023-08-07 PROCEDURE — 97112 NEUROMUSCULAR REEDUCATION: CPT

## 2023-08-07 NOTE — PROGRESS NOTES
Daily Note     Today's date: 2023  Patient name: Mayra Chisholm  : 1960  MRN: [id number]  Referring provider: Fay Torres MD  Dx:   Encounter Diagnosis     ICD-10-CM    1. Shoulder injury, left, initial encounter  S49. 92XA       2. Impingement syndrome of left shoulder  M75.42           Start Time: 1013  Stop Time: 1055  Total time in clinic (min): 42 minutes    Subjective: Patient reports no change in status, no increase in sx after last visit. He is "excited" to see what the MRI says. Objective: See treatment diary below      Assessment: Tolerated treatment well. Patient demonstrated fatigue post treatment, exhibited good technique with therapeutic exercises and would benefit from continued PT. Continued with established plan of care pending further workup. Patient demonstrated fasiculations with SL ER which previously did not. Educated pt that MRI is a snippet of the clinical picture. Plan: Continue with POC pending MRI      Insurance:  AMA/CMS Eval/ Re-eval POC expires [employer] #/ Referral # Total units  Start date  Expiration date Extension  Visit limitation? PT only or  PT+OT? Co-Insurance   [facility], [facility] 2023 8 weeks - 2023 66 Auth # [id number] approved 12 visits           75            23 sent              sent                                 Date 4/27 5/1 5/23 5/25 5/30 6/1 6/5 6/7 6/13 6/15 6/22 6/27   Units:  Used 1 2 3 4 5 6 7 8 9 10 11 12   Authed:  Remaining  11 10 9 8 7 6 5 4 3 2 1 0        Date 6/29 7/6 7/31 8/3 8/7          Units:  Used 1 1 1 1 1          Authed:  Remaining  11 10 9 8 7            Patient provided verbal consent to treatment plan and recommended interventions.     Date 6/27 6/29 7/6 7/31 8/3 8/7   Visit Number  13 14 15 RE 16 17   Manual         GH mobs   Prone PA thoracic spine gr 4 8' AP gr 3-4 5' Prone PA thoracic spine gr 4 8' Prone PA thoracic spine gr 4 5'   STM         Nerve glides         Thoracic mobs Retraction 2x20  Retraction extension 2x20 Retraction 2x20  Retraction extension 2x20       Neuro Re-Ed         Scap retraction   3x10 3x15 3x15 3x15   Wall slide    3x10 3x10 3x10   5 way isometrics    5s hold x10 ea 5s hold x10 ea 5s hold x10 ea   Prone T         Prone Y         SL ER     3x10 3x10   SL abduction         TB row/sh ext     GTB 3x10 GTB 3x10   TB ER      RTB 3x10   Face pull         LT activation o pulleys         TRX Row         TherEx           RM shoulder ext retraction supine 2x50    Retraction loaded at wall x20  Retraction extension x10 retraction supine 2x50    Repeated rotation to L 2x50    RM 90 flexion and ER x20 RM 90 flexion and ER 2x20      Body blade         Pec stretch   10x10s      Lat stretch   10x10s      Thoracic ext   x20-30 3x10 + pec stretch Thoracic series of 5 - 1' ea Thoracic series of 5 - 1' ea   Open book   10 ea Standing 2x10 ea 15 ea 2x10 ea            TherAct         Patient education    Re-eval 10'                                         Gait Training                                    Modalities         CP               Precautions: none  Past Medical History:   Diagnosis Date   • Bradycardia 04/11/2019   • Calculus of gallbladder and bile duct with obstruction without cholecystitis    • Gallstone pancreatitis 04/10/2019    Added automatically from request for surgery [id number]   • GERD (gastroesophageal reflux disease)    • Hematemesis 04/11/2019   • Hypertension    • Pancreatitis     blocked by gall stones   • Wears glasses

## 2023-08-10 ENCOUNTER — OFFICE VISIT (OUTPATIENT)
Dept: PHYSICAL THERAPY | Facility: CLINIC | Age: 63
End: 2023-08-10
Payer: COMMERCIAL

## 2023-08-10 ENCOUNTER — OFFICE VISIT (OUTPATIENT)
Dept: OBGYN CLINIC | Facility: CLINIC | Age: 63
End: 2023-08-10
Payer: COMMERCIAL

## 2023-08-10 VITALS
SYSTOLIC BLOOD PRESSURE: 154 MMHG | BODY MASS INDEX: 29.15 KG/M2 | HEART RATE: 88 BPM | TEMPERATURE: 98.2 F | WEIGHT: 227 LBS | DIASTOLIC BLOOD PRESSURE: 75 MMHG

## 2023-08-10 DIAGNOSIS — S49.92XA SHOULDER INJURY, LEFT, INITIAL ENCOUNTER: Primary | ICD-10-CM

## 2023-08-10 DIAGNOSIS — M75.02 ADHESIVE CAPSULITIS OF LEFT SHOULDER: ICD-10-CM

## 2023-08-10 DIAGNOSIS — M75.02 ADHESIVE CAPSULITIS OF LEFT SHOULDER: Primary | ICD-10-CM

## 2023-08-10 DIAGNOSIS — M75.42 IMPINGEMENT SYNDROME OF LEFT SHOULDER: ICD-10-CM

## 2023-08-10 PROCEDURE — 99213 OFFICE O/P EST LOW 20 MIN: CPT | Performed by: ORTHOPAEDIC SURGERY

## 2023-08-10 PROCEDURE — 97530 THERAPEUTIC ACTIVITIES: CPT

## 2023-08-10 PROCEDURE — 97110 THERAPEUTIC EXERCISES: CPT

## 2023-08-10 RX ORDER — MELATONIN
1000 DAILY
COMMUNITY

## 2023-08-10 NOTE — PROGRESS NOTES
Daily Note     Today's date: 8/10/2023  Patient name: Toya Ortiz  : 1960  MRN: [id number]  Referring provider: Munira Mirza MD  Dx:   Encounter Diagnosis     ICD-10-CM    1. Shoulder injury, left, initial encounter  S49. 92XA       2. Impingement syndrome of left shoulder  M75.42       3. Adhesive capsulitis of left shoulder  M75.02           Start Time: 1100  Stop Time: 1145  Total time in clinic (min): 45 minutes    Subjective: Patient reports he saw the referring provider thinks he has frozen shoulder, MRI is clean from a tendon perspective but does have evidence of capsular thickening. Objective: See treatment diary below  MRI IMPRESSION:     1. Mild supraspinatus and infraspinatus tendinosis without tear.     2. Mild long head of biceps tendinosis without tear.     3. Thickening of inferior glenohumeral ligament with edema in the axillary recess, suggestive of adhesive capsulitis in the appropriate clinical setting. Assessment: Tolerated treatment well. Patient demonstrated fatigue post treatment, exhibited good technique with therapeutic exercises and would benefit from continued PT. Updated POC to reflect new diagnostic information, prognosis. Educated patient on prognosis and updated POC and patient verbalized understanding. HEP updated. Plan: Continue with POC      Insurance:  AMA/CMS Eval/ Re-eval POC expires Rachelle Pete #/ Referral # Total units  Start date  Expiration date Extension  Visit limitation? PT only or  PT+OT?  Co-Insurance   [facility], [facility] 2023 8 weeks - 2023 66 Auth # [id number] approved 12 visits           75            23 sent              sent                                 Date  6 6/7 6/13 6/15 6/22 6/27   Units:  Used 1 2 3 4 5 6 7 8 9 10 11 12   Authed:  Remaining  11 10 9 8 7 6 5 4 3 2 1 0        Date  8/3 8/7 8/10         Units:  Used 1 1 1 1 1 1         Authed:  Remaining  11 10 9 RE 8 7 6 Patient provided verbal consent to treatment plan and recommended interventions.     Date 8/10        Visit Number 18        Manual         GH mobs         Thoracic mobs         STM         Prone 90 abd distraction internal rotation         Neuro Re-Ed         Scap retraction                                                TherEx         PROM Self 2x10 ea        Pulleys 10 ea flex/abd        Posterior cap rollover 10sx5        Assisted sleeper          Bridge + IR         90-90 ER + DB                      TherAct               Patient education  10' AC prognosis and POC                                                                            Gait Training                                                               Modalities               CP/MH [facility] 5' w edu                    Precautions: none  Past Medical History:   Diagnosis Date   • Bradycardia 04/11/2019   • Calculus of gallbladder and bile duct with obstruction without cholecystitis    • Gallstone pancreatitis 04/10/2019    Added automatically from request for surgery [id number]   • GERD (gastroesophageal reflux disease)    • Hematemesis 04/11/2019   • Hypertension    • Pancreatitis     blocked by gall stones   • Wears glasses

## 2023-08-10 NOTE — PROGRESS NOTES
Assessment/Plan:  1. Adhesive capsulitis of left shoulder            Zeynep Chadwick has left-sided shoulder pain and limited range of motion consistent with adhesive capsulitis also known as frozen shoulder. His MRI demonstrates rotator cuff tendinosis and suggestion of adhesive capsulitis but no evidence of rotator cuff tear. I recommended a ultrasound-guided glenohumeral injection and continued physical therapy at this time. Physical therapy can be slightly more aggressive with range of motion after his ultrasound-guided injection which may relieve some of his pain. Follow-up in the office for ultrasound-guided glenohumeral injection at the next available appointment. Subjective:   Doretha Grant is a 61 y.o. male who presents to the office for follow-up for left shoulder pain. He has been having ongoing left shoulder pain for the last 2 months. He was in the office initially in June and received a subacromial cortisone injection and started physical therapy. He was last in the office 1 month ago with continued discomfort in the shoulder and pain with overhead movement. He only had minor relief with the injection. We last sent him for an MRI of the left shoulder and he returns for results today. He states that the pain seems to be increasing and he has been losing range of motion with difficulty lifting his arm over his head. He denies any new fall or trauma. Review of Systems   Constitutional: Negative for chills, fever and unexpected weight change. HENT: Negative for hearing loss, nosebleeds and sore throat. Eyes: Negative for pain, redness and visual disturbance. Respiratory: Negative for cough, shortness of breath and wheezing. Cardiovascular: Negative for chest pain, palpitations and leg swelling. Gastrointestinal: Negative for abdominal pain, nausea and vomiting. Endocrine: Negative for polyphagia and polyuria. Genitourinary: Negative for dysuria and hematuria.    Musculoskeletal: See HPI   Skin: Negative for rash and wound. Neurological: Negative for dizziness, numbness and headaches. Psychiatric/Behavioral: Negative for decreased concentration and suicidal ideas. The patient is not nervous/anxious. Past Medical History:   Diagnosis Date   • Bradycardia 04/11/2019   • Calculus of gallbladder and bile duct with obstruction without cholecystitis    • Gallstone pancreatitis 04/10/2019    Added automatically from request for surgery [id number]   • GERD (gastroesophageal reflux disease)    • Hematemesis 04/11/2019   • Hypertension    • Pancreatitis     blocked by gall stones   • Wears glasses        Past Surgical History:   Procedure Laterality Date   • CHOLECYSTECTOMY      Lap   • CHOLECYSTECTOMY LAPAROSCOPIC N/A 4/15/2019    Procedure: CHOLECYSTECTOMY LAPAROSCOPIC;  Surgeon: Xena Iyer MD;  Location: [facility];  Service: General   • EGD     • ERCP N/A 4/15/2019    Procedure: ENDOSCOPIC RETROGRADE CHOLANGIOPANCREATOGRAPHY (ERCP);   Surgeon: Xena Iyer MD;  Location: [facility];  Service: General   • WISDOM TOOTH EXTRACTION      x4       Family History   Problem Relation Age of Onset   • No Known Problems Mother    • No Known Problems Father    • No Known Problems Brother    • No Known Problems Brother        Social History     Occupational History   • Not on file   Tobacco Use   • Smoking status: Never   • Smokeless tobacco: Never   Vaping Use   • Vaping Use: Never used   Substance and Sexual Activity   • Alcohol use: Yes     Comment: Rarely, every 3-4 months   • Drug use: Not Currently   • Sexual activity: Not Currently     Partners: Male         Current Outpatient Medications:   •  ascorbic acid (VITAMIN C) 1000 MG tablet, Take 1,000 mg by mouth as needed , Disp: , Rfl:   •  B COMPLEX-BIOTIN-FA PO, Take by mouth 3 (three) times a week , Disp: , Rfl:   •  Blood Pressure Monitoring (B-D ASSURE BPM/AUTO ARM CUFF) MISC, Check BP regularly, Disp: 1 each, Rfl: 0  • cholecalciferol (VITAMIN D3) 1,000 units tablet, Take 1,000 Units by mouth daily, Disp: , Rfl:   •  emtricitabine-tenofovir (TRUVADA) 200-300 mg per tablet, , Disp: , Rfl:   •  famotidine (PEPCID) 20 mg tablet, Take 1 tablet (20 mg total) by mouth daily as needed for indigestion or heartburn, Disp: 30 tablet, Rfl: 5  •  multivitamin (THERAGRAN) TABS, Take 1 tablet by mouth 2 (two) times a week , Disp: , Rfl:   •  Probiotic Product (PROBIOTIC ADVANCED) CAPS, Take by mouth as needed , Disp: , Rfl:   •  ramipril (ALTACE) 5 mg capsule, Take 1 capsule (5 mg total) by mouth daily, Disp: 90 capsule, Rfl: 1  •  tadalafil (CIALIS) 5 MG tablet, , Disp: , Rfl:   •  polyethylene glycol (GOLYTELY) 4000 mL solution, Take 4,000 mL by mouth once for 1 dose, Disp: 4000 mL, Rfl: 0  •  Potassium Bicarbonate 99 MG CAPS, Take by mouth as needed  (Patient not taking: Reported on 6/20/2023), Disp: , Rfl:     Allergies   Allergen Reactions   • Azithromycin Diarrhea       Objective:  Vitals:    08/10/23 0906   BP: 154/75   Pulse: 88   Temp: 98.2 °F (36.8 °C)     Pain Score:   7      Left Shoulder Exam     Tenderness   Left shoulder tenderness location: Anterior and posterior glenohumeral tenderness. Range of Motion   Active abduction:  120 abnormal   External rotation:  10 abnormal   Forward flexion:  110 abnormal   Internal rotation 0 degrees:  Sacrum abnormal     Muscle Strength   Abduction: 5/5   Internal rotation: 5/5   External rotation: 5/5   Supraspinatus: 5/5   Subscapularis: 5/5   Biceps: 5/5     Tests   Wheeler test: positive  Drop arm: negative    Other   Erythema: absent  Sensation: normal  Pulse: present             Physical Exam  Vitals and nursing note reviewed. Constitutional:       Appearance: Normal appearance. He is well-developed. HENT:      Head: Normocephalic and atraumatic.       Right Ear: External ear normal.      Left Ear: External ear normal.      Nose: Nose normal.   Eyes:      General: No scleral icterus. Extraocular Movements: Extraocular movements intact. Conjunctiva/sclera: Conjunctivae normal.   Cardiovascular:      Rate and Rhythm: Normal rate. Pulmonary:      Effort: Pulmonary effort is normal. No respiratory distress. Musculoskeletal:      Cervical back: Normal range of motion and neck supple. Comments: See Ortho exam   Skin:     General: Skin is warm and dry. Neurological:      General: No focal deficit present. Mental Status: He is alert and oriented to person, place, and time. Psychiatric:         Behavior: Behavior normal.         I have personally reviewed pertinent films in PACS and my interpretation is as follows:  MRI of the left shoulder demonstrates thickening of the inferior glenohumeral ligament suggestive of adhesive capsulitis. Rotator cuff tendinosis without evidence of tear. This document was created using speech voice recognition software. Grammatical errors, random word insertions, pronoun errors, and incomplete sentences are an occasional consequence of this system due to software limitations, ambient noise, and hardware issues. Any formal questions or concerns about content, text, or information contained within the body of this dictation should be directly addressed to the provider for clarification.

## 2023-08-14 ENCOUNTER — OFFICE VISIT (OUTPATIENT)
Dept: PHYSICAL THERAPY | Facility: CLINIC | Age: 63
End: 2023-08-14
Payer: COMMERCIAL

## 2023-08-14 DIAGNOSIS — M75.02 ADHESIVE CAPSULITIS OF LEFT SHOULDER: ICD-10-CM

## 2023-08-14 DIAGNOSIS — S49.92XA SHOULDER INJURY, LEFT, INITIAL ENCOUNTER: Primary | ICD-10-CM

## 2023-08-14 DIAGNOSIS — M75.42 IMPINGEMENT SYNDROME OF LEFT SHOULDER: ICD-10-CM

## 2023-08-14 PROCEDURE — 97110 THERAPEUTIC EXERCISES: CPT

## 2023-08-14 PROCEDURE — 97140 MANUAL THERAPY 1/> REGIONS: CPT

## 2023-08-14 NOTE — PROGRESS NOTES
Daily Note     Today's date: 2023  Patient name: Tammie De La Vega  : 1960  MRN: [id number]  Referring provider: Remy Dawson MD  Dx:   Encounter Diagnosis     ICD-10-CM    1. Shoulder injury, left, initial encounter  S49. 92XA       2. Impingement syndrome of left shoulder  M75.42       3. Adhesive capsulitis of left shoulder  M75.02           Start Time: 1010  Stop Time: 1040  Total time in clinic (min): 30 minutes    Subjective: Patient already reports improvement in his motion and less pain throughout the range. Objective: See treatment diary below      Assessment: Tolerated treatment well. Patient demonstrated fatigue post treatment, exhibited good technique with therapeutic exercises and would benefit from continued PT. ROM demonstrates improvement as patient is able to decrease guarding however is greatly limited by pain at end range. Plan: Continue with POC      Insurance:  AMA/CMS Eval/ Re-eval POC expires Deandre Bers #/ Referral # Total units  Start date  Expiration date Extension  Visit limitation? PT only or  PT+OT?  Co-Insurance   [facility], [facility] 2023 8 weeks - 2023 66 Auth # [id number] approved 12 visits           75            23 sent              sent                                 Date  6 6/7 6/13 6/15 6/22 6/27   Units:  Used 1 2 3 4 5 6 7 8 9 10 11 12   Authed:  Remaining  11 10 9 8 7 6 5 4 3 2 1 0        Date  7 8/3 8/7 8/10 8/14        Units:  Used 1 1 1 1 1 1 1        Authed:  Remaining  11 10 9 RE 8 7 6 5          Patient provided verbal consent to treatment plan and recommended interventions.     Date 8/10 8/14       Visit Number [id number]       Manual         GH mobs  Gr 3 mobs       Thoracic mobs         STM         Prone 90 abd distraction internal rotation  3x10 5-10s hold       Neuro Re-Ed         Scap retraction                                                TherEx         PROM Self 2x10 ea 10' performed Pulleys 10 ea flex/abd rev       Posterior cap rollover 10sx5 10sx10       Assisted sleeper   10sx10       Bridge + IR         90-90 ER + DB                      TherAct               Patient education  10' AC prognosis and POC  w heat 5'                                                                          Gait Training                                                               Modalities               CP/ [address] 5' w edu [address] 5'                  Precautions: none  Past Medical History:   Diagnosis Date   • Bradycardia 04/11/2019   • Calculus of gallbladder and bile duct with obstruction without cholecystitis    • Gallstone pancreatitis 04/10/2019    Added automatically from request for surgery [id number]   • GERD (gastroesophageal reflux disease)    • Hematemesis 04/11/2019   • Hypertension    • Pancreatitis     blocked by gall stones   • Wears glasses

## 2023-08-15 ENCOUNTER — OFFICE VISIT (OUTPATIENT)
Dept: OTOLARYNGOLOGY | Facility: CLINIC | Age: 63
End: 2023-08-15
Payer: COMMERCIAL

## 2023-08-15 ENCOUNTER — OFFICE VISIT (OUTPATIENT)
Dept: AUDIOLOGY | Facility: CLINIC | Age: 63
End: 2023-08-15
Payer: COMMERCIAL

## 2023-08-15 VITALS — BODY MASS INDEX: 28.23 KG/M2 | WEIGHT: 220 LBS | TEMPERATURE: 97.8 F | HEIGHT: 74 IN
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
## 2023-08-15 DIAGNOSIS — H93.12 TINNITUS OF LEFT EAR: ICD-10-CM

## 2023-08-15 DIAGNOSIS — R09.82 POST-NASAL DRIP: ICD-10-CM

## 2023-08-15 DIAGNOSIS — E07.89 THYROID FULLNESS: ICD-10-CM

## 2023-08-15 DIAGNOSIS — S00.96XA TICK BITE OF HEAD, UNSPECIFIED PART, INITIAL ENCOUNTER: ICD-10-CM

## 2023-08-15 DIAGNOSIS — H90.3 SENSORY HEARING LOSS, BILATERAL: Primary | ICD-10-CM

## 2023-08-15 DIAGNOSIS — H90.42 SENSORINEURAL HEARING LOSS (SNHL) OF LEFT EAR WITH UNRESTRICTED HEARING OF RIGHT EAR: ICD-10-CM

## 2023-08-15 DIAGNOSIS — H91.92 HEARING LOSS OF LEFT EAR, UNSPECIFIED HEARING LOSS TYPE: ICD-10-CM

## 2023-08-15 DIAGNOSIS — H93.13 BILATERAL TINNITUS: Primary | ICD-10-CM

## 2023-08-15 PROCEDURE — 92557 COMPREHENSIVE HEARING TEST: CPT | Performed by: AUDIOLOGIST

## 2023-08-15 PROCEDURE — 92567 TYMPANOMETRY: CPT | Performed by: AUDIOLOGIST

## 2023-08-15 PROCEDURE — 99204 OFFICE O/P NEW MOD 45 MIN: CPT | Performed by: NURSE PRACTITIONER

## 2023-08-15 NOTE — PATIENT INSTRUCTIONS
Post Nasal Drip    Saline sprays/rinses one to two times per day  Fluticasone nasal spray (Flonase) one spray each nostril twice per day - one per day during non allergy seasons    Tinnitus  Tinnitus and possible causes including medications, viral illness, inner ear disorder, TMJ syndrome, or hearing changes. Options for bilateral tinnitus including adding background noise, tinnitus retraining therapy, masking device, Resound tinnitus lisa, Acupuncture, or acceptance. Limit caffeine and ibuprofen intake. Discouraged q-tip use due to damage of ear canal.  Consider Flonase daily for eustachian tube dysfunction. No specific medications or surgery indicated for treatment of tinnitus.   Consider MRI imaging if worsens

## 2023-08-15 NOTE — PROGRESS NOTES
Assessment/Plan:    Bilateral tinnitus  Reviewed Post Nasal Drip/allergy concerns  Recommend:  Saline sprays/rinses one to two times per day  Fluticasone nasal spray (Flonase) one spray each nostril twice per day - one per day during non allergy seasons  If does not improve then consider adding Astelin nasal spray to regimen. Reviewed Tinnitus    Left tinnitus (hissing/ringing) with diminished hearing on the left, more noticeable for past couple of months (approx April, over 4 months). Discussed Tinnitus and possible causes including medications, viral illness, inner ear disorder, TMJ syndrome, or hearing changes. Audiogram today reviewed, interpreted and indicating right hearing normal with very mild high frequency SNHL, Left hearing is asymmetric compared to right, difference of greater than 10 db from 2K to 8K, mild to moderate SNHL. tymps type A bilaterally. Word discrimination 100 on right and 96 on left. Options for bilateral tinnitus including adding background noise, tinnitus retraining therapy, masking device, Resound tinnitus lisa, Acupuncture, or acceptance. Limit caffeine and ibuprofen intake. Discouraged q-tip use due to damage of ear canal.  Consider Flonase daily for eustachian tube dysfunction. No specific medications or surgery indicated for treatment of tinnitus. Reviewed causes of asymmetrical SNHL in sudden nature including autoimmune, Lyme, vascular event, viral, inflammatory process, vs acoustic neuroma. Hearing change on left noticeable since April, over 4 months. Reviewed treatment options including oral steroids for two weeks, transtympanic injection, lab studies, and imaging. Due to longevity limited efficacy of oral steroids and transtympanic injections. Options include lifestyle changes such as moving closer to those who are speaking, or hearing amplification.   He would qualify for hearing amplification based on audiogram.      After discussion pt agreed to labs and MRI. Depending on results may consider hearing aid on left recommend repeat hearing test in 3 months vs one year      Follow up after testing         Diagnoses and all orders for this visit:    Bilateral tinnitus  -     Ambulatory referral to Audiology  -     Lyme Total AB W Reflex to IGM/IGG; Future  -     CHELO w/Reflex; Future  -     RF Screen w/ Reflex to Titer; Future  -     Comprehensive metabolic panel; Future  -     CBC and differential; Future  -     TSH, 3rd generation with Free T4 reflex; Future  -     T4; Future  -     Cancel: CHELO w/Reflex  -     Cancel: Comprehensive metabolic panel  -     Cancel: CBC and differential  -     Cancel: TSH, 3rd generation with Free T4 reflex  -     Cancel: T4  -     MRI brain IAC wo and w contrast; Future    Post-nasal drip  -     Lyme Total AB W Reflex to IGM/IGG; Future  -     CHELO w/Reflex; Future  -     RF Screen w/ Reflex to Titer; Future  -     Comprehensive metabolic panel; Future  -     CBC and differential; Future  -     TSH, 3rd generation with Free T4 reflex; Future  -     T4; Future  -     Cancel: CHELO w/Reflex  -     Cancel: Comprehensive metabolic panel  -     Cancel: CBC and differential  -     Cancel: TSH, 3rd generation with Free T4 reflex  -     Cancel: T4    Tinnitus of left ear  -     Ambulatory Referral to Otolaryngology  -     Lyme Total AB W Reflex to IGM/IGG; Future  -     CHELO w/Reflex; Future  -     RF Screen w/ Reflex to Titer; Future  -     Comprehensive metabolic panel; Future  -     CBC and differential; Future  -     TSH, 3rd generation with Free T4 reflex; Future  -     T4; Future  -     Cancel: CHELO w/Reflex  -     Cancel: Comprehensive metabolic panel  -     Cancel: CBC and differential  -     Cancel: TSH, 3rd generation with Free T4 reflex  -     Cancel: T4    Hearing loss of left ear, unspecified hearing loss type  -     Ambulatory Referral to Otolaryngology  -     Lyme Total AB W Reflex to IGM/IGG; Future  -     CHELO w/Reflex;  Future  - RF Screen w/ Reflex to Titer; Future  -     Comprehensive metabolic panel; Future  -     CBC and differential; Future  -     TSH, 3rd generation with Free T4 reflex; Future  -     T4; Future  -     Cancel: CHELO w/Reflex  -     Cancel: Comprehensive metabolic panel  -     Cancel: CBC and differential  -     Cancel: TSH, 3rd generation with Free T4 reflex  -     Cancel: T4    Sensorineural hearing loss (SNHL) of left ear with unrestricted hearing of right ear  -     Lyme Total AB W Reflex to IGM/IGG; Future  -     CHELO w/Reflex; Future  -     RF Screen w/ Reflex to Titer; Future  -     Comprehensive metabolic panel; Future  -     CBC and differential; Future  -     TSH, 3rd generation with Free T4 reflex; Future  -     T4; Future  -     Cancel: CHELO w/Reflex  -     Cancel: Comprehensive metabolic panel  -     Cancel: CBC and differential  -     Cancel: TSH, 3rd generation with Free T4 reflex  -     Cancel: T4  -     MRI brain IAC wo and w contrast; Future    Tick bite of head, unspecified part, initial encounter  -     Lyme Total AB W Reflex to IGM/IGG; Future  -     CHELO w/Reflex; Future  -     RF Screen w/ Reflex to Titer; Future  -     Comprehensive metabolic panel; Future  -     CBC and differential; Future  -     TSH, 3rd generation with Free T4 reflex; Future  -     T4; Future  -     Cancel: CHELO w/Reflex  -     Cancel: Comprehensive metabolic panel  -     Cancel: CBC and differential  -     Cancel: TSH, 3rd generation with Free T4 reflex  -     Cancel: T4    Thyroid fullness  -     Lyme Total AB W Reflex to IGM/IGG; Future  -     CHELO w/Reflex; Future  -     RF Screen w/ Reflex to Titer; Future  -     Comprehensive metabolic panel; Future  -     CBC and differential; Future  -     TSH, 3rd generation with Free T4 reflex;  Future  -     T4; Future  -     Cancel: CHELO w/Reflex  -     Cancel: Comprehensive metabolic panel  -     Cancel: CBC and differential  -     Cancel: TSH, 3rd generation with Free T4 reflex  - Cancel: T4          Subjective:      Patient ID: Colten Joiner is a 61 y.o. male. Presents today as a new patient due to ear and nasal concerns. Hearing gradually worsening, more noticeable in left ear since April. Hissing to ringing tinnitus. No otalgia or otorrhea. No history of ear surgery. No current hearing aids. No recent hearing test.  History wax build up in ears, left more than right. No URI at start but noticed allergies were worse this year. PND         The following portions of the patient's history were reviewed and updated as appropriate: allergies, current medications, past family history, past medical history, past social history, past surgical history and problem list.    Review of Systems   Constitutional: Negative. HENT: Positive for hearing loss and tinnitus. Negative for congestion, ear discharge, ear pain, nosebleeds, postnasal drip, rhinorrhea, sinus pressure, sinus pain, sore throat and voice change. Respiratory: Negative for chest tightness and shortness of breath. Skin: Negative for color change. Neurological: Negative for dizziness, numbness and headaches. Psychiatric/Behavioral: Negative. Objective:      Temp 97.8 °F (36.6 °C) (Temporal)   Ht 6' 2" (1.88 m)   Wt 99.8 kg (220 lb)   BMI 28.25 kg/m²          Physical Exam  Constitutional:       Appearance: He is well-developed. HENT:      Head: Normocephalic. Right Ear: Hearing, tympanic membrane, ear canal and external ear normal. No decreased hearing noted. No drainage or tenderness. Tympanic membrane is not perforated or erythematous. Left Ear: Hearing, tympanic membrane, ear canal and external ear normal. No decreased hearing noted. No drainage or tenderness. Tympanic membrane is not perforated or erythematous. Nose: Nose normal. No nasal deformity or septal deviation. Mouth/Throat:      Mouth: Mucous membranes are not pale and not dry. No oral lesions.       Dentition: Normal dentition. Pharynx: Uvula midline. No oropharyngeal exudate. Neck:      Trachea: No tracheal deviation. Pulmonary:      Effort: No accessory muscle usage or respiratory distress. Musculoskeletal:      Cervical back: Neck supple. Lymphadenopathy:      Cervical: No cervical adenopathy. Skin:     General: Skin is warm and dry. Neurological:      Mental Status: He is alert and oriented to person, place, and time. Cranial Nerves: No cranial nerve deficit. Sensory: No sensory deficit. Psychiatric:         Behavior: Behavior is cooperative.

## 2023-08-15 NOTE — ASSESSMENT & PLAN NOTE
Reviewed Post Nasal Drip/allergy concerns  Recommend:  Saline sprays/rinses one to two times per day  Fluticasone nasal spray (Flonase) one spray each nostril twice per day - one per day during non allergy seasons  If does not improve then consider adding Astelin nasal spray to regimen. Reviewed Tinnitus    Left tinnitus (hissing/ringing) with diminished hearing on the left, more noticeable for past couple of months (approx April, over 4 months). Discussed Tinnitus and possible causes including medications, viral illness, inner ear disorder, TMJ syndrome, or hearing changes. Audiogram today reviewed, interpreted and indicating right hearing normal with very mild high frequency SNHL, Left hearing is asymmetric compared to right, difference of greater than 10 db from 2K to 8K, mild to moderate SNHL. tymps type A bilaterally. Word discrimination 100 on right and 96 on left. Options for bilateral tinnitus including adding background noise, tinnitus retraining therapy, masking device, Resound tinnitus lisa, Acupuncture, or acceptance. Limit caffeine and ibuprofen intake. Discouraged q-tip use due to damage of ear canal.  Consider Flonase daily for eustachian tube dysfunction. No specific medications or surgery indicated for treatment of tinnitus. Reviewed causes of asymmetrical SNHL in sudden nature including autoimmune, Lyme, vascular event, viral, inflammatory process, vs acoustic neuroma. Hearing change on left noticeable since April, over 4 months. Reviewed treatment options including oral steroids for two weeks, transtympanic injection, lab studies, and imaging. Due to longevity limited efficacy of oral steroids and transtympanic injections. Options include lifestyle changes such as moving closer to those who are speaking, or hearing amplification. He would qualify for hearing amplification based on audiogram.      After discussion pt agreed to labs and MRI.   Depending on results may consider hearing aid on left recommend repeat hearing test in 3 months vs one year      Follow up after testing

## 2023-08-15 NOTE — PROGRESS NOTES
ADULT ENT HEARING EVALUATION - [address] AUDIOLOGY      Patient Name: Mauri Washington   MRN:  [id number]   :  1960   Age: 61 y.o. Gender: male   DOS: 8/15/2023     HISTORY:     Mauri Washington, a 61 y.o. male, was seen on 8/15/2023 at the referral of MARGE Quiñonez,  for an evaluation of hearing as part of his ENT visit. Mr. Javad Barton primary complaint is "muddy" left ear with tinnitus. He denied otalgia, otorrhea and dizziness. Mr. Tracey Sánchez has not had his hearing tested previously. RESULTS:    Otoscopic Evaluation:   Right Ear: Unremarkable, canal clear   Left Ear: Unremarkable, canal clear    Tympanometry:   Right Ear: Type A; normal middle ear pressure and static compliance    Left Ear: Type A; normal middle ear pressure and static compliance     Audiometry:  Conventional pure tone audiometry from 250 - 8000 Hz  obtained with good reliability and revealed the following:     Right Ear: essentially normal hearing sensitivity   Left Ear: mild to moderate sensorineural hearing loss (SNHL)     Speech Audiometry:    Speech Reception (SRT)   Right Ear: 15 dB HL   Left Ear: 25 dB HL    Word Recognition Scores (WRS):  Right Ear: excellent (100 % correct)     Left Ear: excellent (96 % correct)   Stimuli: [id number]    *see attached audiogram*      RECOMMENDATIONS:    1.) Follow-up with referring provider, MARGE Quiñonez due to asymmetry. 2.) Audiologic re-evaluation in 3-6 months or in conjunction with otologic follow-up. It was a pleasure working with Mr. Tracey Sánchez today. Thank you for referring this patient.      Frantz Dillard., [facility]  Clinical Audiologist    [address] [id number]

## 2023-08-17 ENCOUNTER — APPOINTMENT (OUTPATIENT)
Dept: LAB | Facility: CLINIC | Age: 63
End: 2023-08-17
Payer: COMMERCIAL

## 2023-08-17 ENCOUNTER — OFFICE VISIT (OUTPATIENT)
Dept: PHYSICAL THERAPY | Facility: CLINIC | Age: 63
End: 2023-08-17
Payer: COMMERCIAL

## 2023-08-17 DIAGNOSIS — H91.92 HEARING LOSS OF LEFT EAR, UNSPECIFIED HEARING LOSS TYPE: ICD-10-CM

## 2023-08-17 DIAGNOSIS — H93.13 BILATERAL TINNITUS: ICD-10-CM
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
## 2023-08-17 DIAGNOSIS — H90.42 SENSORINEURAL HEARING LOSS (SNHL) OF LEFT EAR WITH UNRESTRICTED HEARING OF RIGHT EAR: ICD-10-CM

## 2023-08-17 DIAGNOSIS — M75.02 ADHESIVE CAPSULITIS OF LEFT SHOULDER: ICD-10-CM

## 2023-08-17 DIAGNOSIS — H93.12 TINNITUS OF LEFT EAR: ICD-10-CM

## 2023-08-17 DIAGNOSIS — M75.42 IMPINGEMENT SYNDROME OF LEFT SHOULDER: ICD-10-CM

## 2023-08-17 DIAGNOSIS — S00.96XA TICK BITE OF HEAD, UNSPECIFIED PART, INITIAL ENCOUNTER: ICD-10-CM

## 2023-08-17 DIAGNOSIS — E07.89 THYROID FULLNESS: ICD-10-CM

## 2023-08-17 DIAGNOSIS — R09.82 POST-NASAL DRIP: ICD-10-CM

## 2023-08-17 DIAGNOSIS — S49.92XA SHOULDER INJURY, LEFT, INITIAL ENCOUNTER: Primary | ICD-10-CM

## 2023-08-17 LAB
ALBUMIN SERPL BCP-MCNC: 4.1 G/DL (ref 3.5–5)
ALP SERPL-CCNC: 56 U/L (ref 46–116)
ALT SERPL W P-5'-P-CCNC: 24 U/L (ref 12–78)
ANA SER QL IA: NEGATIVE
ANION GAP SERPL CALCULATED.3IONS-SCNC: 1 MMOL/L
AST SERPL W P-5'-P-CCNC: 21 U/L (ref 5–45)
BASOPHILS # BLD AUTO: 0.02 THOUSANDS/ÂΜL (ref 0–0.1)
BASOPHILS NFR BLD AUTO: 0 % (ref 0–1)
BILIRUB SERPL-MCNC: 0.88 MG/DL (ref 0.2–1)
BUN SERPL-MCNC: 15 MG/DL (ref 5–25)
CALCIUM SERPL-MCNC: 9.7 MG/DL (ref 8.3–10.1)
CHLORIDE SERPL-SCNC: 108 MMOL/L (ref 96–108)
CO2 SERPL-SCNC: 30 MMOL/L (ref 21–32)
CREAT SERPL-MCNC: 1.36 MG/DL (ref 0.6–1.3)
EOSINOPHIL # BLD AUTO: 0.08 THOUSAND/ÂΜL (ref 0–0.61)
EOSINOPHIL NFR BLD AUTO: 1 % (ref 0–6)
ERYTHROCYTE [DISTWIDTH] IN BLOOD BY AUTOMATED COUNT: 13.6 % (ref 11.6–15.1)
GFR SERPL CREATININE-BSD FRML MDRD: 54 ML/MIN/1.73SQ M
GLUCOSE P FAST SERPL-MCNC: 108 MG/DL (ref 65–99)
HCT VFR BLD AUTO: 50.2 % (ref 36.5–49.3)
HGB BLD-MCNC: 16.6 G/DL (ref 12–17)
IMM GRANULOCYTES # BLD AUTO: 0.03 THOUSAND/UL (ref 0–0.2)
IMM GRANULOCYTES NFR BLD AUTO: 0 % (ref 0–2)
LYMPHOCYTES # BLD AUTO: 2.01 THOUSANDS/ÂΜL (ref 0.6–4.47)
LYMPHOCYTES NFR BLD AUTO: 26 % (ref 14–44)
MCH RBC QN AUTO: 29.9 PG (ref 26.8–34.3)
MCHC RBC AUTO-ENTMCNC: 33.1 G/DL (ref 31.4–37.4)
MCV RBC AUTO: 90 FL (ref 82–98)
MONOCYTES # BLD AUTO: 0.58 THOUSAND/ÂΜL (ref 0.17–1.22)
MONOCYTES NFR BLD AUTO: 7 % (ref 4–12)
NEUTROPHILS # BLD AUTO: 5.09 THOUSANDS/ÂΜL (ref 1.85–7.62)
NEUTS SEG NFR BLD AUTO: 66 % (ref 43–75)
NRBC BLD AUTO-RTO: 0 /100 WBCS
PLATELET # BLD AUTO: 211 THOUSANDS/UL (ref 149–390)
PMV BLD AUTO: 9.3 FL (ref 8.9–12.7)
POTASSIUM SERPL-SCNC: 4.8 MMOL/L (ref 3.5–5.3)
PROT SERPL-MCNC: 7.2 G/DL (ref 6.4–8.4)
RBC # BLD AUTO: 5.56 MILLION/UL (ref 3.88–5.62)
SODIUM SERPL-SCNC: 139 MMOL/L (ref 135–147)
T4 SERPL-MCNC: 7.67 UG/DL (ref 6.09–12.23)
TSH SERPL DL<=0.05 MIU/L-ACNC: 1.41 UIU/ML (ref 0.45–4.5)
WBC # BLD AUTO: 7.81 THOUSAND/UL (ref 4.31–10.16)

## 2023-08-17 PROCEDURE — 97110 THERAPEUTIC EXERCISES: CPT

## 2023-08-17 PROCEDURE — 36415 COLL VENOUS BLD VENIPUNCTURE: CPT

## 2023-08-17 PROCEDURE — 86618 LYME DISEASE ANTIBODY: CPT

## 2023-08-17 PROCEDURE — 84443 ASSAY THYROID STIM HORMONE: CPT

## 2023-08-17 PROCEDURE — 85025 COMPLETE CBC W/AUTO DIFF WBC: CPT

## 2023-08-17 PROCEDURE — 86038 ANTINUCLEAR ANTIBODIES: CPT

## 2023-08-17 PROCEDURE — 80053 COMPREHEN METABOLIC PANEL: CPT

## 2023-08-17 PROCEDURE — 97140 MANUAL THERAPY 1/> REGIONS: CPT

## 2023-08-17 PROCEDURE — 86430 RHEUMATOID FACTOR TEST QUAL: CPT

## 2023-08-17 PROCEDURE — 84436 ASSAY OF TOTAL THYROXINE: CPT

## 2023-08-17 NOTE — PROGRESS NOTES
Daily Note     Today's date: 2023  Patient name: Mallory Rosales  : 1960  MRN: [id number]  Referring provider: Mahamed Longo MD  Dx:   Encounter Diagnosis     ICD-10-CM    1. Shoulder injury, left, initial encounter  S49. 92XA       2. Impingement syndrome of left shoulder  M75.42       3. Adhesive capsulitis of left shoulder  M75.02           Start Time: 1012  Stop Time: 1038  Total time in clinic (min): 26 minutes    Subjective: Patient reports poor compliance with HEP the past 2 days       Objective: See treatment diary below      Assessment: Tolerated treatment well. Patient demonstrated fatigue post treatment, exhibited good technique with therapeutic exercises and would benefit from continued PT. Demonstrated increased guarding today compared to previous visit, patient struggled to maintain end range stretches with PTOP. Reviewed importance of HEP compliance in order to maximize benefit of PT. Plan: Continue with POC      Insurance:  AMA/CMS Eval/ Re-eval POC expires Neal Medina #/ Referral # Total units  Start date  Expiration date Extension  Visit limitation? PT only or  PT+OT?  Co-Insurance   [facility], [facility] 2023 8 weeks - 2023 66 Auth # [id number] approved 12 visits           75            23 sent              sent                                 Date  6 6/7 6/13 6/15 6/22 6/27   Units:  Used 1 2 3 4 5 6 7 8 9 10 11 12   Authed:  Remaining  11 10 9 8 7 6 5 4 3 2 1 0        Date  8/3 8/7 8/10 8/14 8/17       Units:  Used 1 1 1 1 1 1 1 1       Authed:  Remaining  11 10 9 RE 8 7 6 5 4         Patient provided verbal consent to treatment plan and recommended interventions.     Date 8/10 8/14 8/17      Visit Number 18 19 20      Manual         GH mobs  Gr 3 mobs       Thoracic mobs         STM         Prone 90 abd distraction internal rotation  3x10 5-10s hold 5x5-10s hold      Neuro Re-Ed         Scap retraction                                        TherEx         PROM Self 2x10 ea 10' performed  10' performed      Pulleys 10 ea flex/abd rev       Posterior cap rollover 10sx5 10sx10 10sx10      Assisted sleeper   10sx10 10sx10      Bridge + IR         90-90 ER + DB                      TherAct               Patient education  10' AC prognosis and POC  w heat 5' w heat 5'                                                                        Gait Training                                                               Modalities               CP/ [address] 5' w edu [address] 5' [address] 5'                Precautions: none  Past Medical History:   Diagnosis Date   • Bradycardia 04/11/2019   • Calculus of gallbladder and bile duct with obstruction without cholecystitis    • Gallstone pancreatitis 04/10/2019    Added automatically from request for surgery [id number]   • GERD (gastroesophageal reflux disease)    • Hematemesis 04/11/2019   • Hypertension    • Pancreatitis     blocked by gall stones   • Wears glasses

## 2023-08-18 LAB
B BURGDOR IGG+IGM SER QL IA: NEGATIVE
RHEUMATOID FACT SER QL LA: NEGATIVE

## 2023-08-21 ENCOUNTER — HOSPITAL ENCOUNTER (OUTPATIENT)
Dept: RADIOLOGY | Facility: HOSPITAL | Age: 63
Discharge: HOME | End: 2023-08-21
Payer: COMMERCIAL

## 2023-08-21 ENCOUNTER — OFFICE VISIT (OUTPATIENT)
Dept: PHYSICAL THERAPY | Facility: CLINIC | Age: 63
End: 2023-08-21
Payer: COMMERCIAL

## 2023-08-21 DIAGNOSIS — H90.42 SENSORINEURAL HEARING LOSS (SNHL) OF LEFT EAR WITH UNRESTRICTED HEARING OF RIGHT EAR: ICD-10-CM

## 2023-08-21 DIAGNOSIS — H93.13 BILATERAL TINNITUS: ICD-10-CM

## 2023-08-21 DIAGNOSIS — S49.92XA SHOULDER INJURY, LEFT, INITIAL ENCOUNTER: Primary | ICD-10-CM

## 2023-08-21 DIAGNOSIS — M75.42 IMPINGEMENT SYNDROME OF LEFT SHOULDER: ICD-10-CM

## 2023-08-21 DIAGNOSIS — M75.02 ADHESIVE CAPSULITIS OF LEFT SHOULDER: ICD-10-CM

## 2023-08-21 PROCEDURE — A9585 GADOBUTROL INJECTION: HCPCS | Performed by: NURSE PRACTITIONER

## 2023-08-21 PROCEDURE — 97140 MANUAL THERAPY 1/> REGIONS: CPT

## 2023-08-21 PROCEDURE — G1004 CDSM NDSC: HCPCS

## 2023-08-21 PROCEDURE — 70553 MRI BRAIN STEM W/O & W/DYE: CPT

## 2023-08-21 PROCEDURE — 97110 THERAPEUTIC EXERCISES: CPT

## 2023-08-21 RX ORDER — GADOBUTROL 604.72 MG/ML
10 INJECTION INTRAVENOUS
Status: COMPLETED | OUTPATIENT
Start: 2023-08-21 | End: 2023-08-21

## 2023-08-21 RX ADMIN — GADOBUTROL 10 ML: 604.72 INJECTION INTRAVENOUS at 14:41

## 2023-08-21 NOTE — PROGRESS NOTES
Daily Note     Today's date: 2023  Patient name: Barbara Sanford  : 1960  MRN: [id number]  Referring provider: Beverly Han MD  Dx:   Encounter Diagnosis     ICD-10-CM    1. Shoulder injury, left, initial encounter  S49. 92XA       2. Impingement syndrome of left shoulder  M75.42       3. Adhesive capsulitis of left shoulder  M75.02           Start Time: 1015  Stop Time: 1045  Total time in clinic (min): 30 minutes    Subjective: Patient reports improved HEP compliance and notes improvements in ROM. Objective: See treatment diary below      Assessment: Tolerated treatment well. Patient demonstrated fatigue post treatment, exhibited good technique with therapeutic exercises and would benefit from continued PT. ROM continues to improve with pain noted later in range. Plan: Continue with POC      Insurance:  AMA/CMS Eval/ Re-eval POC expires Billye Mile #/ Referral # Total units  Start date  Expiration date Extension  Visit limitation? PT only or  PT+OT?  Co-Insurance   [facility], [facility] 2023 8 weeks - 2023 66 Auth # [id number] approved 12 visits           75            23 sent              sent                                 Date 4/27 5/1 5/23 5/25 5/30 6/1 6/5 6/7 6/13 6/15 6/22 6/27   Units:  Used 1 2 3 4 5 6 7 8 9 10 11 12   Authed:  Remaining  11 10 9 8 7 6 5 4 3 2 1 0        Date  8/3 8/7 8/10 8/14 8/17 8/21      Units:  Used 1 1 1 1 1 1 1 1 1      Authed:  Remaining  11 10 9 RE 8 7 6 5 4 3        Patient provided verbal consent to treatment plan and recommended interventions.     Date 8/10 8/14 8/17 8/21     Visit Number 18 19 20 21     Manual         GH mobs  Gr 3 mobs  Gr 3 mobs     Thoracic mobs         STM         Prone 90 abd distraction internal rotation  3x10 5-10s hold 5x5-10s hold 10x5-10s hold     Neuro Re-Ed         Scap retraction                                                TherEx         PROM Self 2x10 ea 10' performed  10' performed 8' performed Pulleys 10 ea flex/abd rev       Posterior cap rollover 10sx5 10sx10 10sx10 10sx10     Assisted sleeper   10sx10 10sx10 10sx10     Bridge + IR    x3     90-90 ER + DB                      TherAct               Patient education  10' AC prognosis and POC  w heat 5' w heat 5'  rev HEP and POC                                                                      Gait Training                                                               Modalities               CP/ [address] 5' w edu [address] 5' [address] 5'                Precautions: none  Past Medical History:   Diagnosis Date   • Bradycardia 04/11/2019   • Calculus of gallbladder and bile duct with obstruction without cholecystitis    • Gallstone pancreatitis 04/10/2019    Added automatically from request for surgery [id number]   • GERD (gastroesophageal reflux disease)    • Hematemesis 04/11/2019   • Hypertension    • Pancreatitis     blocked by gall stones   • Wears glasses

## 2023-08-23 NOTE — PROGRESS NOTES
Re Evaluation Note     Today's date: 2023  Patient name: Ashley Rubio  : 1960  MRN: [id number]  Referring provider: Mariano Kate MD  Dx:   Encounter Diagnosis     ICD-10-CM    1. Adhesive capsulitis of left shoulder  M75.02 PT plan of care cert/re-cert      2. Shoulder injury, left, initial encounter  S49. 92XA PT plan of care cert/re-cert      3. Impingement syndrome of left shoulder  M75.42 PT plan of care cert/re-cert          Start Time: 1100  Stop Time: 1125  Total time in clinic (min): 25 minutes    Subjective: Patient reports 20% improvement since the MRI. He notes he is better able to reach overhead but cannot manage any weight, and the most difficult activities include putting on a belt and putting dishes away in the high cabinet. He is receiving a cortisone injection later today. Pain at rest 1/10, when using 7-8/10       Objective: See treatment diary below  Shoulder    2023    LEFT RIGHT LEFT LEFT LEFT   Flexion [facility] WFL* WFL* WFL   Abduction WFL* WFL WFL* WFL* 90%*   Internal Rotation [facility] WFL WFL* C3*   External Rotation [facility] WFL Buttock*   *Indicates pain with testing  UE MMT   2023    LEFT RIGHT LEFT LEFT LEFT   Shoulder Shrug 5 5 5 5 5   Shoulder Abduction/ 4+ 5 4+* 5* 5*   Shoulder External Rotation 4+ 5 4+* 4+* 4+   Shoulder Internal Rotation 5 5 4+* 4 4+   Shoulder Flexion 5 5 5 5 5   Shoulder Extension 5 5              *Indicates pain with testing      Joint Play  - Anterior Capsule: hypomobile, painful    Palpation  - TTP: anterior and superior shoulder joint      Assessment: Tolerated treatment well. Patient demonstrated fatigue post treatment, exhibited good technique with therapeutic exercises and would benefit from continued PT.  Continued with established plan of care due to responding well to current program.   Patient had a change in status since last re-evaluation, was placed on hold for further work up from referring provider, and returned to physical therapy with new diagnosis of adhesive capsulitis. Patient demonstrates impairments in shoulder ROM and joint mobility, pain with function. These impairments continue to limited the patient's ability to reach overhead, lift, carry, dress and bathe. 5 goals have been met. They require 12 visits, 2x/week over 6 weeks to address these limitations. Marianela Carmichael has demonstrated improvement in physical therapy and would continue to benefit from skilled PT to address the above impairments and return to PLOF. Goals  MET Goals     - Patient will be independent in basic HEP 2-3 weeks  - Patient will report >50% reduction in pain  - Patient will demonstrate >1/3 improvement in MMT grade as applicable  - Demonstrate consistent posture corrections without cueing during therapeutic exercise  - Patient FOTO score will improve to 76/100    PROGRESSING Goals 6 weeks - 10/5/2023  - Patient will be independent in a comprehensive home exercise program  - Patient will have full ROM  - Patient will self-report >80% improvement in function  - Patient will lift 10# overhead  - Patient will reach behind the seat in the front seat of the car    Plan: Continue with POC      Insurance:  AMA/CMS Eval/ Re-eval POC expires [facility] #/ Referral # Total units  Start date  Expiration date Extension  Visit limitation? PT only or  PT+OT?  Co-Insurance   [facility], [facility] 4/27/2023 8 weeks - 6/22/2023 66 Auth # [id number] approved 12 visits 4/27 7/31          75            6/22/23 sent             7/31 sent             8/24/23 Sent                    Date 4/27 5/1 5/23 5/25 5/30 6/1 6/5 6/7 6/13 6/15 6/22 6/27   Units:  Used 1 2 3 4 5 6 7 8 9 10 11 12   Authed:  Remaining  11 10 9 8 7 6 5 4 3 2 1 0        Date 6/29 7/6 7/31 8/3 8/7 8/10 8/14 8/17 8/21 8/24     Units:  Used 1 1 1 1 1 1 1 1 1 1     Authed:  Remaining  11 10 9 RE 8 7 6 5 4 3 2 RE       Patient provided verbal consent to treatment plan and recommended interventions.     Date 8/10 8/14 8/17 8/21 8/24    Visit Number 18 19 20 21 22 RE    Manual         GH mobs  Gr 3 mobs  Gr 3 mobs Gr 3 mobs    Thoracic mobs         STM         Prone 90 abd distraction internal rotation  3x10 5-10s hold 5x5-10s hold 10x5-10s hold 10x5-10s hold    Neuro Re-Ed         Scap retraction                                                TherEx         PROM Self 2x10 ea 10' performed  10' performed 8' performed 8' performed    Pulleys 10 ea flex/abd rev       Posterior cap rollover 10sx5 10sx10 10sx10 10sx10 10sx10    Assisted sleeper   10sx10 10sx10 10sx10 10sx10    Bridge + IR    x3     90-90 ER + DB                      TherAct               Patient education  10' AC prognosis and POC  w heat 5' w heat 5'  rev HEP and POC RE 5'                                                                     Gait Training                                                               Modalities               CP/ [address] 5' w edu [address] 5' [address] 5'                Precautions: none  Past Medical History:   Diagnosis Date   • Bradycardia 04/11/2019   • Calculus of gallbladder and bile duct with obstruction without cholecystitis    • Gallstone pancreatitis 04/10/2019    Added automatically from request for surgery [id number]   • GERD (gastroesophageal reflux disease)    • Hematemesis 04/11/2019   • Hypertension    • Pancreatitis     blocked by gall stones   • Wears glasses

## 2023-08-24 ENCOUNTER — OFFICE VISIT (OUTPATIENT)
Dept: OBGYN CLINIC | Facility: CLINIC | Age: 63
End: 2023-08-24
Payer: COMMERCIAL

## 2023-08-24 ENCOUNTER — EVALUATION (OUTPATIENT)
Dept: PHYSICAL THERAPY | Facility: CLINIC | Age: 63
End: 2023-08-24
Payer: COMMERCIAL

## 2023-08-24 VITALS
SYSTOLIC BLOOD PRESSURE: 174 MMHG | DIASTOLIC BLOOD PRESSURE: 102 MMHG | WEIGHT: 223 LBS | BODY MASS INDEX: 28.62 KG/M2 | HEIGHT: 74 IN | HEART RATE: 71 BPM

## 2023-08-24 DIAGNOSIS — S49.92XA SHOULDER INJURY, LEFT, INITIAL ENCOUNTER: ICD-10-CM

## 2023-08-24 DIAGNOSIS — M75.02 ADHESIVE CAPSULITIS OF LEFT SHOULDER: Primary | ICD-10-CM

## 2023-08-24 DIAGNOSIS — M75.42 IMPINGEMENT SYNDROME OF LEFT SHOULDER: ICD-10-CM

## 2023-08-24 PROCEDURE — 97140 MANUAL THERAPY 1/> REGIONS: CPT

## 2023-08-24 PROCEDURE — 99214 OFFICE O/P EST MOD 30 MIN: CPT | Performed by: ORTHOPAEDIC SURGERY

## 2023-08-24 PROCEDURE — 97110 THERAPEUTIC EXERCISES: CPT

## 2023-08-24 PROCEDURE — 20611 DRAIN/INJ JOINT/BURSA W/US: CPT | Performed by: ORTHOPAEDIC SURGERY

## 2023-08-24 RX ORDER — LIDOCAINE HYDROCHLORIDE 10 MG/ML
4 INJECTION, SOLUTION INFILTRATION; PERINEURAL
Status: COMPLETED | OUTPATIENT
Start: 2023-08-24 | End: 2023-08-24

## 2023-08-24 RX ORDER — TRIAMCINOLONE ACETONIDE 40 MG/ML
40 INJECTION, SUSPENSION INTRA-ARTICULAR; INTRAMUSCULAR
Status: COMPLETED | OUTPATIENT
Start: 2023-08-24 | End: 2023-08-24

## 2023-08-24 RX ADMIN — LIDOCAINE HYDROCHLORIDE 4 ML: 10 INJECTION, SOLUTION INFILTRATION; PERINEURAL at 14:15

## 2023-08-24 RX ADMIN — TRIAMCINOLONE ACETONIDE 40 MG: 40 INJECTION, SUSPENSION INTRA-ARTICULAR; INTRAMUSCULAR at 14:15

## 2023-08-24 NOTE — PROGRESS NOTES
Assessment/Plan:  1. Adhesive capsulitis of left shoulder  Large joint arthrocentesis: L glenohumeral          Adi Penaloza has left-sided shoulder pain consistent with adhesive capsulitis and he tolerated ultrasound-guided glenohumeral injection today without complication. Hopefully this gives him significant relief going forward and improves his pain and allows therapy to advance. Follow-up after PT if needed. Large joint arthrocentesis: L glenohumeral  Universal Protocol:  Consent: Verbal consent obtained. Risks and benefits: risks, benefits and alternatives were discussed  Consent given by: patient  Time out: Immediately prior to procedure a "time out" was called to verify the correct patient, procedure, equipment, support staff and site/side marked as required. Site marked: the operative site was marked  Supporting Documentation  Indications: pain   Procedure Details  Location: shoulder - L glenohumeral  Preparation: Patient was prepped and draped in the usual sterile fashion  Needle size: 25 G  Ultrasound guidance: yes  Approach: posterior  Medications administered: 4 mL lidocaine 1 %; 40 mg triamcinolone acetonide 40 mg/mL    Patient tolerance: patient tolerated the procedure well with no immediate complications  Dressing:  Sterile dressing applied          Subjective:   Edward Reid is a 61 y.o. male who presents to the office for a scheduled ultrasound-guided left glenohumeral shoulder injection. He was previously diagnosed with adhesive capsulitis and has been continue with physical therapy. He states that he seems to be improving with physical therapy but still has pain and is requesting the injection. Review of Systems   Constitutional: Negative for chills, fever and unexpected weight change. HENT: Negative for hearing loss, nosebleeds and sore throat. Eyes: Negative for pain, redness and visual disturbance. Respiratory: Negative for cough, shortness of breath and wheezing. Cardiovascular: Negative for chest pain, palpitations and leg swelling. Gastrointestinal: Negative for abdominal pain, nausea and vomiting. Endocrine: Negative for polyphagia and polyuria. Genitourinary: Negative for dysuria and hematuria. Musculoskeletal:        See HPI   Skin: Negative for rash and wound. Neurological: Negative for dizziness, numbness and headaches. Psychiatric/Behavioral: Negative for decreased concentration and suicidal ideas. The patient is not nervous/anxious. Past Medical History:   Diagnosis Date   • Bradycardia 04/11/2019   • Calculus of gallbladder and bile duct with obstruction without cholecystitis    • Gallstone pancreatitis 04/10/2019    Added automatically from request for surgery [id number]   • GERD (gastroesophageal reflux disease)    • Hematemesis 04/11/2019   • Hypertension    • Pancreatitis     blocked by gall stones   • Wears glasses        Past Surgical History:   Procedure Laterality Date   • CHOLECYSTECTOMY      Lap   • CHOLECYSTECTOMY LAPAROSCOPIC N/A 4/15/2019    Procedure: CHOLECYSTECTOMY LAPAROSCOPIC;  Surgeon: Chelsie Montez MD;  Location: [facility];  Service: General   • EGD     • ERCP N/A 4/15/2019    Procedure: ENDOSCOPIC RETROGRADE CHOLANGIOPANCREATOGRAPHY (ERCP);   Surgeon: Chelsie Montez MD;  Location: [facility];  Service: General   • WISDOM TOOTH EXTRACTION      x4       Family History   Problem Relation Age of Onset   • No Known Problems Mother    • No Known Problems Father    • No Known Problems Brother    • No Known Problems Brother        Social History     Occupational History   • Not on file   Tobacco Use   • Smoking status: Never   • Smokeless tobacco: Never   Vaping Use   • Vaping Use: Never used   Substance and Sexual Activity   • Alcohol use: Yes     Comment: Rarely, every 3-4 months   • Drug use: Not Currently   • Sexual activity: Not Currently     Partners: Male         Current Outpatient Medications:   •  ascorbic acid (VITAMIN C) 1000 MG tablet, Take 1,000 mg by mouth as needed , Disp: , Rfl:   •  B COMPLEX-BIOTIN-FA PO, Take by mouth 3 (three) times a week , Disp: , Rfl:   •  Blood Pressure Monitoring (B-D ASSURE BPM/AUTO ARM CUFF) MISC, Check BP regularly, Disp: 1 each, Rfl: 0  •  cholecalciferol (VITAMIN D3) 1,000 units tablet, Take 1,000 Units by mouth daily, Disp: , Rfl:   •  emtricitabine-tenofovir (TRUVADA) 200-300 mg per tablet, , Disp: , Rfl:   •  famotidine (PEPCID) 20 mg tablet, Take 1 tablet (20 mg total) by mouth daily as needed for indigestion or heartburn, Disp: 30 tablet, Rfl: 5  •  multivitamin (THERAGRAN) TABS, Take 1 tablet by mouth 2 (two) times a week , Disp: , Rfl:   •  Probiotic Product (PROBIOTIC ADVANCED) CAPS, Take by mouth as needed , Disp: , Rfl:   •  ramipril (ALTACE) 5 mg capsule, Take 1 capsule (5 mg total) by mouth daily, Disp: 90 capsule, Rfl: 1  •  tadalafil (CIALIS) 5 MG tablet, , Disp: , Rfl:   •  polyethylene glycol (GOLYTELY) 4000 mL solution, Take 4,000 mL by mouth once for 1 dose, Disp: 4000 mL, Rfl: 0  •  Potassium Bicarbonate 99 MG CAPS, Take by mouth as needed  (Patient not taking: Reported on 6/20/2023), Disp: , Rfl:     Allergies   Allergen Reactions   • Azithromycin Diarrhea       Objective:  Vitals:    08/24/23 1445   BP: (!) 174/102   Pulse: 71     Pain Score:   1      Left Shoulder Exam     Tenderness   The patient is experiencing no tenderness. Range of Motion   Active abduction:  150 abnormal   Passive abduction:  150 abnormal   External rotation:  50 abnormal   Forward flexion:  150 abnormal             Physical Exam  Vitals and nursing note reviewed. Constitutional:       Appearance: Normal appearance. He is well-developed. HENT:      Head: Normocephalic and atraumatic. Right Ear: External ear normal.      Left Ear: External ear normal.      Nose: Nose normal.   Eyes:      General: No scleral icterus. Extraocular Movements: Extraocular movements intact. Conjunctiva/sclera: Conjunctivae normal.   Cardiovascular:      Rate and Rhythm: Normal rate. Pulmonary:      Effort: Pulmonary effort is normal. No respiratory distress. Musculoskeletal:      Cervical back: Normal range of motion and neck supple. Comments: See Ortho exam   Skin:     General: Skin is warm and dry. Neurological:      General: No focal deficit present. Mental Status: He is alert and oriented to person, place, and time. Psychiatric:         Behavior: Behavior normal.             This document was created using speech voice recognition software. Grammatical errors, random word insertions, pronoun errors, and incomplete sentences are an occasional consequence of this system due to software limitations, ambient noise, and hardware issues. Any formal questions or concerns about content, text, or information contained within the body of this dictation should be directly addressed to the provider for clarification.

## 2023-08-28 ENCOUNTER — OFFICE VISIT (OUTPATIENT)
Dept: PHYSICAL THERAPY | Facility: CLINIC | Age: 63
End: 2023-08-28
Payer: COMMERCIAL

## 2023-08-28 DIAGNOSIS — M75.42 IMPINGEMENT SYNDROME OF LEFT SHOULDER: ICD-10-CM

## 2023-08-28 DIAGNOSIS — S49.92XA SHOULDER INJURY, LEFT, INITIAL ENCOUNTER: Primary | ICD-10-CM

## 2023-08-28 DIAGNOSIS — M75.02 ADHESIVE CAPSULITIS OF LEFT SHOULDER: ICD-10-CM

## 2023-08-28 PROCEDURE — 97110 THERAPEUTIC EXERCISES: CPT

## 2023-08-28 PROCEDURE — 97140 MANUAL THERAPY 1/> REGIONS: CPT

## 2023-08-28 NOTE — PROGRESS NOTES
Daily Note     Today's date: 2023  Patient name: Tolu Moody  : 1960  MRN: [id number]  Referring provider: Alva Smart MD  Dx:   Encounter Diagnosis     ICD-10-CM    1. Shoulder injury, left, initial encounter  S49. 92XA       2. Impingement syndrome of left shoulder  M75.42       3. Adhesive capsulitis of left shoulder  M75.02           Start Time: 1014  Stop Time: 1040  Total time in clinic (min): 26 minutes    Subjective: Notes improvement since guided injection, feels he can go further before the point of pain      Objective: See treatment diary below      Assessment: Tolerated treatment well. Patient demonstrated fatigue post treatment, exhibited good technique with therapeutic exercises and would benefit from continued PT      Plan: Continue per plan of care. Insurance:  AMA/CMS Eval/ Re-eval POC expires Vincent Will #/ Referral # Total units  Start date  Expiration date Extension  Visit limitation? PT only or  PT+OT?  Co-Insurance   [facility], [facility] 2023 8 weeks - 2023 66 Auth # [id number] approved 12 visits           75            23 sent              sent             23 Sent                    Date 4/27 5/1 5/23 5/25 5/30 6/1 6/5 6/7 6/13 6/15 6/22 6/27   Units:  Used 1 2 3 4 5 6 7 8 9 10 11 12   Authed:  Remaining  11 10 9 8 7 6 5 4 3 2 1 0        Date  8/3 8/7 8/10 8/14 8/17 8/21 8/24 8/28    Units:  Used 1 1 1 1 1 1 1 1 1 1 1    Authed:  Remaining  11 10 9 RE 8 7 6 5 4 3 2 RE 1      Patient provided verbal consent to treatment plan and recommended interventions.     Date 8/10 8/14 8/17 8/21 8/24 8/28   Visit Number 18 19 20 21 22 RE 23   Manual         GH mobs  Gr 3 mobs  Gr 3 mobs Gr 3 mobs Gr 3 mobs   Thoracic mobs           STM         Prone 90 abd distraction internal rotation  3x10 5-10s hold 5x5-10s hold 10x5-10s hold 10x5-10s hold 10x5-10s hold   Neuro Re-Ed         Scap retraction                                                TherEx PROM Self 2x10 ea 10' performed  10' performed 8' performed 8' performed 8' performed   Pulleys 10 ea flex/abd rev       Posterior cap rollover 10sx5 10sx10 10sx10 10sx10 10sx10 10sx10   Assisted sleeper   10sx10 10sx10 10sx10 10sx10 10sx10   Bridge + IR    x3     90-90 ER + DB                      TherAct               Patient education  10' AC prognosis and POC  w heat 5' w heat 5'  rev HEP and POC RE 5'                                                                       Gait Training                                                               Modalities               CP/ [address] 5' w edu [address] 5' [address] 5'                Precautions: none  Past Medical History:   Diagnosis Date   • Bradycardia 04/11/2019   • Calculus of gallbladder and bile duct with obstruction without cholecystitis    • Gallstone pancreatitis 04/10/2019    Added automatically from request for surgery [id number]   • GERD (gastroesophageal reflux disease)    • Hematemesis 04/11/2019   • Hypertension    • Pancreatitis     blocked by gall stones   • Wears glasses

## 2023-08-31 ENCOUNTER — OFFICE VISIT (OUTPATIENT)
Dept: PHYSICAL THERAPY | Facility: CLINIC | Age: 63
End: 2023-08-31
Payer: COMMERCIAL

## 2023-08-31 DIAGNOSIS — M75.42 IMPINGEMENT SYNDROME OF LEFT SHOULDER: ICD-10-CM

## 2023-08-31 DIAGNOSIS — S49.92XA SHOULDER INJURY, LEFT, INITIAL ENCOUNTER: Primary | ICD-10-CM

## 2023-08-31 PROCEDURE — 97110 THERAPEUTIC EXERCISES: CPT

## 2023-08-31 PROCEDURE — 97140 MANUAL THERAPY 1/> REGIONS: CPT

## 2023-08-31 NOTE — PROGRESS NOTES
Daily Note     Today's date: 2023  Patient name: Yesi Martinez  : 1960  MRN: [id number]  Referring provider: Curtis Adams MD  Dx:   Encounter Diagnosis     ICD-10-CM    1. Shoulder injury, left, initial encounter  S49. 92XA       2. Impingement syndrome of left shoulder  M75.42           Start Time: 1100  Stop Time: 1125  Total time in clinic (min): 25 minutes    Subjective: Notes continued improvements in all directions, struggles most with IR      Objective: See treatment diary below      Assessment: Tolerated treatment well. Patient demonstrated fatigue post treatment, exhibited good technique with therapeutic exercises and would benefit from continued PT. Continued with established plan of care due to responding well to current program.       Plan: Continue per plan of care. Insurance:  AMA/CMS Eval/ Re-eval POC expires Lewis Tang #/ Referral # Total units  Start date  Expiration date Extension  Visit limitation? PT only or  PT+OT?  Co-Insurance   [facility], [facility] 2023 8 weeks - 2023 66 Auth # [id number] approved 12 visits           75            23 sent              sent             23 Sent                    Date 4/27 5/1 5/23 5/25 5/30 6/1 6/5 6/7 6/13 6/15 6/22 6/27   Units:  Used 1 2 3 4 5 6 7 8 9 10 11 12   Authed:  Remaining  11 10 9 8 7 6 5 4 3 2 1 0        Date  8/3 8/7 8/10 8/14 8/17 8/21 8/24 8/28 8/31   Units:  Used 1 1 1 1 1 1 1 1 1 1 1 1   Authed:  Remaining  11 10 9 RE 8 7 6 5 4 3 2 RE 1 0      Date               Units:  Used               Authed:  Remaining                  Patient provided verbal consent to treatment plan and recommended interventions.     Date 8/10 8/14 8/17 8/21 8/24 8/28 8/31   Visit Number 18 19 20 21 22 RE 23 24   Manual          GH mobs  Gr 3 mobs  Gr 3 mobs Gr 3 mobs Gr 3 mobs Gr 3 mobs   Thoracic mobs            STM          Prone 90 abd distraction internal rotation  3x10 5-10s hold 5x5-10s hold 10x5-10s hold 10x5-10s hold 10x5-10s hold 10x10s hold   Neuro Re-Ed          Scap retraction                                                     TherEx          PROM Self 2x10 ea 10' performed  10' performed 8' performed 8' performed 8' performed 8' performed   Pulleys 10 ea flex/abd rev        Posterior cap rollover 10sx5 10sx10 10sx10 10sx10 10sx10 10sx10    Assisted sleeper   10sx10 10sx10 10sx10 10sx10 10sx10 10sx20   Bridge + IR    x3      90-90 ER + DB                        TherAct                Patient education  10' AC prognosis and POC  w heat 5' w heat 5'  rev HEP and POC RE 5'   Rev HEP modifications                                                                         Gait Training                                                                   Modalities                CP/ [address] 5' w edu [address] 5' [address] 5'                 Precautions: none  Past Medical History:   Diagnosis Date   • Bradycardia 04/11/2019   • Calculus of gallbladder and bile duct with obstruction without cholecystitis    • Gallstone pancreatitis 04/10/2019    Added automatically from request for surgery [id number]   • GERD (gastroesophageal reflux disease)    • Hematemesis 04/11/2019   • Hypertension    • Pancreatitis     blocked by gall stones   • Wears glasses

## 2023-09-05 ENCOUNTER — APPOINTMENT (OUTPATIENT)
Dept: PHYSICAL THERAPY | Facility: CLINIC | Age: 63
End: 2023-09-05
Payer: COMMERCIAL

## 2023-09-07 ENCOUNTER — APPOINTMENT (OUTPATIENT)
Dept: PHYSICAL THERAPY | Facility: CLINIC | Age: 63
End: 2023-09-07
Payer: COMMERCIAL

## 2023-09-11 ENCOUNTER — OFFICE VISIT (OUTPATIENT)
Dept: PHYSICAL THERAPY | Facility: CLINIC | Age: 63
End: 2023-09-11
Payer: COMMERCIAL

## 2023-09-11 DIAGNOSIS — M75.02 ADHESIVE CAPSULITIS OF LEFT SHOULDER: ICD-10-CM

## 2023-09-11 DIAGNOSIS — S49.92XA SHOULDER INJURY, LEFT, INITIAL ENCOUNTER: Primary | ICD-10-CM

## 2023-09-11 DIAGNOSIS — M75.42 IMPINGEMENT SYNDROME OF LEFT SHOULDER: ICD-10-CM

## 2023-09-11 PROCEDURE — 97110 THERAPEUTIC EXERCISES: CPT

## 2023-09-11 PROCEDURE — 97140 MANUAL THERAPY 1/> REGIONS: CPT

## 2023-09-11 NOTE — PROGRESS NOTES
Daily Note     Today's date: 2023  Patient name: Walter Puente  : 1960  MRN: [id number]  Referring provider: Severa Finner, MD  Dx:   Encounter Diagnosis     ICD-10-CM    1. Shoulder injury, left, initial encounter  S49. 92XA       2. Impingement syndrome of left shoulder  M75.42       3. Adhesive capsulitis of left shoulder  M75.02           Start Time: 1015  Stop Time: 1045  Total time in clinic (min): 30 minutes    Subjective: He has been consistent with HEP while out of the clinic. He is concerned that insurance only gave him 6 visits. Objective: See treatment diary below      Assessment: Tolerated treatment well. Patient demonstrated fatigue post treatment, exhibited good technique with therapeutic exercises and would benefit from continued PT. Continued with established plan of care due to responding well to current program. Reassured patient that 1x/week is appropriate at this time as he is maintaining ROM between sessions. Plan: Continue per plan of care. Insurance:  AMA/CMS Eval/ Re-eval POC expires Emmalene Promise #/ Referral # Total units  Start date  Expiration date Extension  Visit limitation? PT only or  PT+OT?  Co-Insurance   [facility] 2023 8 weeks - 2023 66 Auth # [id number] approved 12 visits           75            23 sent              sent             23 Yanna Harris #[id number] partially approved 6 visits 23              Date 4/27 5/1 5/23 5/25 5/30 6/1 6/5 6/7 6/13 6/15 6/22 6/27   Units:  Used 1 2 3 4 5 6 7 8 9 10 11 12   Authed:  Remaining  11 10 9 8 7 6 5 4 3 2 1 0        Date  8/3 8/7 8/10 8/14 8/17 8/21 8/24 8/28 8/31   Units:  Used 1 1 1 1 1 1 1 1 1 1 1 1   Authed:  Remaining  11 10 9 RE 8 7 6 5 4 3 2 RE 1 0      Date               Units:  Used 1              Authed:  Remaining  5                Patient provided verbal consent to treatment plan and recommended interventions.     Date 8/10  8/31 9/11   Visit Number 18 19 20 21 22 RE 23 24 25   Manual           GH mobs  Gr 3 mobs  Gr 3 mobs Gr 3 mobs Gr 3 mobs Gr 3 mobs Gr 3-4 mobs   Thoracic mobs             STM           Prone 90 abd distraction internal rotation  3x10 5-10s hold 5x5-10s hold 10x5-10s hold 10x5-10s hold 10x5-10s hold 10x10s hold 10x10s hold   Neuro Re-Ed           Scap retraction                                                          TherEx           PROM Self 2x10 ea 10' performed  10' performed 8' performed 8' performed 8' performed 8' performed 8' performed   Pulleys 10 ea flex/abd rev         Posterior cap rollover 10sx5 10sx10 10sx10 10sx10 10sx10 10sx10  10sx10   Assisted sleeper   10sx10 10sx10 10sx10 10sx10 10sx10 10sx20 10sx20   Bridge + IR    x3    rev   90-90 ER + DB                          TherAct                 Patient education  10' AC prognosis and POC  w heat 5' w heat 5'  rev HEP and POC RE 5'   Rev HEP modifications Rev HEP and progressions                                                                             Gait Training                                                                       Modalities                 CP/ [address] 5' w edu [address] 5' [address] 5'                  Precautions: none  Past Medical History:   Diagnosis Date   • Bradycardia 04/11/2019   • Calculus of gallbladder and bile duct with obstruction without cholecystitis    • Gallstone pancreatitis 04/10/2019    Added automatically from request for surgery [id number]   • GERD (gastroesophageal reflux disease)    • Hematemesis 04/11/2019   • Hypertension    • Pancreatitis     blocked by gall stones   • Wears glasses

## 2023-09-14 ENCOUNTER — APPOINTMENT (OUTPATIENT)
Dept: PHYSICAL THERAPY | Facility: CLINIC | Age: 63
End: 2023-09-14
Payer: COMMERCIAL

## 2023-09-19 ENCOUNTER — OFFICE VISIT (OUTPATIENT)
Dept: PHYSICAL THERAPY | Facility: CLINIC | Age: 63
End: 2023-09-19
Payer: COMMERCIAL

## 2023-09-19 DIAGNOSIS — S49.92XA SHOULDER INJURY, LEFT, INITIAL ENCOUNTER: Primary | ICD-10-CM

## 2023-09-19 DIAGNOSIS — M75.42 IMPINGEMENT SYNDROME OF LEFT SHOULDER: ICD-10-CM

## 2023-09-19 DIAGNOSIS — M75.02 ADHESIVE CAPSULITIS OF LEFT SHOULDER: ICD-10-CM

## 2023-09-19 PROCEDURE — 97140 MANUAL THERAPY 1/> REGIONS: CPT

## 2023-09-19 PROCEDURE — 97110 THERAPEUTIC EXERCISES: CPT

## 2023-09-19 NOTE — PROGRESS NOTES
Daily Note     Today's date: 2023  Patient name: Joelle García  : 1960  MRN: [id number]  Referring provider: Chris Montoya MD  Dx:   Encounter Diagnosis     ICD-10-CM    1. Shoulder injury, left, initial encounter  S49. 92XA       2. Impingement syndrome of left shoulder  M75.42       3. Adhesive capsulitis of left shoulder  M75.02           Start Time: 1020  Stop Time: 1050  Total time in clinic (min): 30 minutes    Subjective: He has been consistent with HEP while out of the clinic. He is concerned that insurance only gave him 6 visits. Objective: See treatment diary below      Assessment: Tolerated treatment well. Patient demonstrated fatigue post treatment, exhibited good technique with therapeutic exercises and would benefit from continued PT. Continued with established plan of care due to responding well to current program. Reassured patient that 1x/week is appropriate at this time as he is maintaining ROM between sessions. Plan: Continue per plan of care. Insurance:  AMA/CMS Eval/ Re-eval POC expires Ahoskie Constable #/ Referral # Total units  Start date  Expiration date Extension  Visit limitation? PT only or  PT+OT?  Co-Insurance   [facility], [facility] 2023 8 weeks - 2023 66 Auth # [id number] approved 12 visits           75            23 sent              sent             23 Luisa Griffin #[id number] partially approved 6 visits 23              Date 4/27 5/1 5/23 5/25 5/30 6/1 6/5 6/7 6/13 6/15 6/22 6/27   Units:  Used 1 2 3 4 5 6 7 8 9 10 11 12   Authed:  Remaining  11 10 9 8 7 6 5 4 3 2 1 0        Date  8/3 8/ 8/10 8/14 8/17 8/21 8/24 8/28 8/31   Units:  Used 1 1 1 1 1 1 1 1 1 1 1 1   Authed:  Remaining  11 10 9 RE 8 7 6 5 4 3 2 RE 1 0      Date              Units:  Used 1 1             Authed:  Remaining  5 4               Patient provided verbal consent to treatment plan and recommended interventions.     Date  8/31 9/11 9/19   Visit Number 20 21 22 RE 23 24 25 26   Manual          GH mobs  Gr 3 mobs Gr 3 mobs Gr 3 mobs Gr 3 mobs Gr 3-4 mobs Gr 3-4 mobs   Thoracic mobs            STM          Prone 90 abd distraction internal rotation 5x5-10s hold 10x5-10s hold 10x5-10s hold 10x5-10s hold 10x10s hold 10x10s hold 10x10s hold   Neuro Re-Ed          Scap retraction                                                     TherEx          PROM 10' performed 8' performed 8' performed 8' performed 8' performed 8' performed 8' performed   Pulleys          Posterior cap rollover 10sx10 10sx10 10sx10 10sx10  10sx10 10sx10   Assisted sleeper  10sx10 10sx10 10sx10 10sx10 10sx20 10sx20 10sx20   Bridge + IR  x3    rev rev   90-90 ER + DB                        TherAct              Patient education w heat 5'  rev HEP and POC RE 5'   Rev HEP modifications Rev HEP and progressions Rev HEP and progressions                                                               Gait Training                                                           Modalities              CP/[facility] 5'                   Precautions: none  Past Medical History:   Diagnosis Date   • Bradycardia 04/11/2019   • Calculus of gallbladder and bile duct with obstruction without cholecystitis    • Gallstone pancreatitis 04/10/2019    Added automatically from request for surgery [id number]   • GERD (gastroesophageal reflux disease)    • Hematemesis 04/11/2019   • Hypertension    • Pancreatitis     blocked by gall stones   • Wears glasses

## 2023-09-21 ENCOUNTER — APPOINTMENT (OUTPATIENT)
Dept: PHYSICAL THERAPY | Facility: CLINIC | Age: 63
End: 2023-09-21
Payer: COMMERCIAL

## 2023-09-25 ENCOUNTER — APPOINTMENT (OUTPATIENT)
Dept: PHYSICAL THERAPY | Facility: CLINIC | Age: 63
End: 2023-09-25
Payer: COMMERCIAL

## 2023-09-25 ENCOUNTER — CONSULT (OUTPATIENT)
Dept: PULMONOLOGY | Facility: MEDICAL CENTER | Age: 63
End: 2023-09-25
Payer: COMMERCIAL

## 2023-09-25 VITALS
HEART RATE: 63 BPM | BODY MASS INDEX: 29.05 KG/M2 | SYSTOLIC BLOOD PRESSURE: 154 MMHG | DIASTOLIC BLOOD PRESSURE: 86 MMHG | OXYGEN SATURATION: 97 % | WEIGHT: 226.4 LBS | TEMPERATURE: 98 F | HEIGHT: 74 IN | RESPIRATION RATE: 12 BRPM

## 2023-09-25 DIAGNOSIS — J98.4 PULMONARY OSSIFICATION: Primary | ICD-10-CM

## 2023-09-25 DIAGNOSIS — K21.9 GASTROESOPHAGEAL REFLUX DISEASE WITHOUT ESOPHAGITIS: ICD-10-CM

## 2023-09-25 DIAGNOSIS — R93.89 ABNORMAL CT OF THE CHEST: ICD-10-CM

## 2023-09-25 DIAGNOSIS — J30.2 SEASONAL ALLERGIES: ICD-10-CM

## 2023-09-25 DIAGNOSIS — R09.82 POST-NASAL DRIP: ICD-10-CM

## 2023-09-25 DIAGNOSIS — I10 PRIMARY HYPERTENSION: ICD-10-CM

## 2023-09-25 PROCEDURE — 99204 OFFICE O/P NEW MOD 45 MIN: CPT | Performed by: INTERNAL MEDICINE

## 2023-09-25 RX ORDER — FLUTICASONE PROPIONATE 50 MCG
1 SPRAY, SUSPENSION (ML) NASAL DAILY
Qty: 9.9 ML | Refills: 0 | Status: SHIPPED | OUTPATIENT
Start: 2023-09-25

## 2023-09-25 NOTE — ASSESSMENT & PLAN NOTE
I reviewed high-resolution CT scan of chest done on 23 with Wayna Apgar. There are some increased interstitial markings with nodular calcifications compatible with diffuse pulmonary ossification. Most likely cause this case could be GERD with intermittent silent aspiration. Does have history of GERD symptoms. I did recommend that he not eat or drink 2 hours before bedtime. I also recommended he may want to make follow-up visit with his gastroenterologist Dr. iMcheal Mccracken her last EGD was in 2019 he had some mild inflammation at the GE junction. No evidence of Escalona's esophagitis though. I recommended he try Pepcid or Prilosec over-the-counter when needed. Complete PFT done 2023 was reviewed with him. This showed moderate restrictive impairment TLC of 4.37 L or 55% of predicted. Also mild decrease in diffusion capacity measurement. This restrictive impairment likely due to pulm ossification but he does avoid some white and may have some restrictive impairment from a decreased abdominal girth. I did recommend he try to lose weight    I did order follow-up complete PFT to be done in 1 year and also high-resolution CT scan of chest today included serum CHELO and C-reactive protein level that will get checked soon.   I told if he had any increased shortness of breath before next visit in 1 year he should contact my office

## 2023-09-25 NOTE — ASSESSMENT & PLAN NOTE
Does get periodic GERD symptoms. In past he used to take Tums with relief. S EGD was done in July 2019.   There are some mild inflammation near the GE junction but no evidence of Escalona's esophagitis

## 2023-09-25 NOTE — PROGRESS NOTES
Assessment/Plan:       Problem List Items Addressed This Visit        Digestive    Gastroesophageal reflux disease without esophagitis     Does get periodic GERD symptoms. In past he used to take Tums with relief. S EGD was done in July 2019. There are some mild inflammation near the GE junction but no evidence of Escalona's esophagitis            Respiratory    Pulmonary ossification - Primary     I reviewed high-resolution CT scan of chest done on 7/1/23 with Tom Ribera. There are some increased interstitial markings with nodular calcifications compatible with diffuse pulmonary ossification. Most likely cause this case could be GERD with intermittent silent aspiration. Does have history of GERD symptoms. I did recommend that he not eat or drink 2 hours before bedtime. I also recommended he may want to make follow-up visit with his gastroenterologist Dr. Almaguer Semen her last EGD was in July 2019 he had some mild inflammation at the GE junction. No evidence of Escalona's esophagitis though. I recommended he try Pepcid or Prilosec over-the-counter when needed. Complete PFT done July 18, 2023 was reviewed with him. This showed moderate restrictive impairment TLC of 4.37 L or 55% of predicted. Also mild decrease in diffusion capacity measurement. This restrictive impairment likely due to pulm ossification but he does avoid some white and may have some restrictive impairment from a decreased abdominal girth. I did recommend he try to lose weight    I did order follow-up complete PFT to be done in 1 year and also high-resolution CT scan of chest today included serum CHELO and C-reactive protein level that will get checked soon.   I told if he had any increased shortness of breath before next visit in 1 year he should contact my office         Relevant Medications    fluticasone (FLONASE) 50 mcg/act nasal spray    Other Relevant Orders    Complete PFT with post bronchodilator    CT chest high resolution    C-reactive protein    CHELO Screen w/ Reflex to Titer/Pattern       Cardiovascular and Mediastinum    Primary hypertension     Maggy Ferreira presently is on ramipril 5 mg daily. Blood pressure today slightly elevated. Other    Post-nasal drip     Does have some intermittent cough from postnasal drainage. May have seasonal allergies. Seasonal allergies     Prescribed fluticasone nasal spray can use 2 sprays each nostril once a day as needed for postnasal drainage. Does have allergy symptoms. Did order serum [facility] allergy panel to make and also try over-the-counter Claritin or Allegra         Relevant Medications    fluticasone (FLONASE) 50 mcg/act nasal spray    Other Relevant Orders    [facility] Panel, Adult    Abnormal CT of the chest     I reviewed CT scan of chest done in July of this year and showed images with patient              Plan for follow up:  Return in about 9 months (around 7/10/2024). All questions are answered to the patient's satisfaction and understanding. He verbalizes understanding. He is encouraged to call with any further questions or concerns. Portions of the record may have been created with voice recognition software. Occasional wrong word or "sound a like" substitutions may have occurred due to the inherent limitations of voice recognition software. Read the chart carefully and recognize, using context, where substitutions have occurred. a    Electronically Signed by Maria Guadalupe Hebert DO    ______________________________________________________________________    Chief Complaint: some cough, here for evaluation of abnormal CT scan of chest       Patient ID: Maggy Ferreira is a 61 y.o. y.o. male has a past medical history of Bradycardia (04/11/2019), Calculus of gallbladder and bile duct with obstruction without cholecystitis, Gallstone pancreatitis (04/10/2019), GERD (gastroesophageal reflux disease), Hematemesis (04/11/2019), Hypertension, Pancreatitis, and Wears glasses. 9/25/2023  Patient presents today for initial visit for abnormal Ct of chest.    BETY Klein is 59years old and had CT scan of chest on July 1 which showed increased interstitial markings with some nodular and linear calcification compatible with diffuse pulmonary ossification. He does have some periodic cough and postnasal drainage. Patient only takes OTC antihistamine when the post nasal drip is severe. Has some SOB when walking up a steep incline. Otherwise does not get short of breath with his usual activities. No wheezing or chest pain. Only time he has shortness of breath when he walks up a steep hill. Patient experiences heartburn at least weekly, dependent on his meals and meal timing. He takes Pepcid as needed. Does have history of gallstone pancreatitis few years ago and had laparoscopic cholecystectomy done he has had upper endoscopy done before which showed evidence of some gastritis and gastric reflux but no evidence of Escalona's esophagitis. Patient snores occasionally. Denies any excessive daytime somnolence. No history of asthma. He does have history of hypertension and does take ramipril 5 mg daily. No history of severe pneumonia and no history of heart disease. 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 HENT: Positive for postnasal drip. Negative for congestion, rhinorrhea and sore throat. Eyes: Negative for discharge and redness. Respiratory: Positive for cough. Negative for shortness of breath and wheezing. Cardiovascular: Negative for chest pain, palpitations and leg swelling. Gastrointestinal: Negative for abdominal distention, abdominal pain and nausea. Endocrine: Negative for polydipsia and polyphagia. Genitourinary: Negative for dysuria. Musculoskeletal: Negative for joint swelling and myalgias. Skin: Negative for rash. Neurological: Negative for light-headedness. Psychiatric/Behavioral: Negative for decreased concentration. Social history: He reports that he has never smoked. He has never used smokeless tobacco. He reports current alcohol use. He reports that he does not currently use drugs. Past surgical history:   Past Surgical History:   Procedure Laterality Date   • CHOLECYSTECTOMY      Lap   • CHOLECYSTECTOMY LAPAROSCOPIC N/A 4/15/2019    Procedure: CHOLECYSTECTOMY LAPAROSCOPIC;  Surgeon: Edilson Harris MD;  Location: [facility];  Service: General   • EGD     • ERCP N/A 4/15/2019    Procedure: ENDOSCOPIC RETROGRADE CHOLANGIOPANCREATOGRAPHY (ERCP);   Surgeon: Edilson Harris MD;  Location: [facility];  Service: General   • WISDOM TOOTH EXTRACTION      x4     Family history:   Family History   Problem Relation Age of Onset   • No Known Problems Mother    • No Known Problems Father    • No Known Problems Brother    • No Known Problems Brother        Immunization History   Administered Date(s) Administered   • COVID-19 MODERNA VACC 0.5 ML IM 04/14/2021, 05/12/2021, 12/16/2021, 04/16/2023   • Smallpox Monkeypox Vaccine, Live Attenuated, Preservative) 04/03/2023   • Zoster Vaccine Recombinant 03/19/2023     Current Outpatient Medications   Medication Sig Dispense Refill   • ascorbic acid (VITAMIN C) 1000 MG tablet Take 1,000 mg by mouth as needed      • B COMPLEX-BIOTIN-FA PO Take by mouth 3 (three) times a week      • Blood Pressure Monitoring (B-D ASSURE BPM/AUTO ARM CUFF) MISC Check BP regularly 1 each 0   • cholecalciferol (VITAMIN D3) 1,000 units tablet Take 1,000 Units by mouth daily     • emtricitabine-tenofovir (TRUVADA) 200-300 mg per tablet      • famotidine (PEPCID) 20 mg tablet Take 1 tablet (20 mg total) by mouth daily as needed for indigestion or heartburn 30 tablet 5   • fluticasone (FLONASE) 50 mcg/act nasal spray 1 spray into each nostril daily 9.9 mL 0   • multivitamin [facility]) TABS Take 1 tablet by mouth 2 (two) times a week      • Probiotic Product (PROBIOTIC ADVANCED) CAPS Take by mouth as needed      • ramipril (ALTACE) 5 mg capsule Take 1 capsule (5 mg total) by mouth daily 90 capsule 1   • tadalafil (CIALIS) 5 MG tablet      • polyethylene glycol (GOLYTELY) 4000 mL solution Take 4,000 mL by mouth once for 1 dose 4000 mL 0   • Potassium Bicarbonate 99 MG CAPS Take by mouth as needed  (Patient not taking: Reported on 6/20/2023)       No current facility-administered medications for this visit. Allergies: Azithromycin    Objective:  Vitals:    09/25/23 0848   BP: 154/86   BP Location: Left arm   Patient Position: Sitting   Cuff Size: Extra-Large   Pulse: 63   Resp: 12   Temp: 98 °F (36.7 °C)   TempSrc: Temporal   SpO2: 97%   Weight: 103 kg (226 lb 6.4 oz)   Height: 6' 2" (1.88 m)   Oxygen Therapy  SpO2: 97 %  . Wt Readings from Last 3 Encounters:   09/25/23 103 kg (226 lb 6.4 oz)   08/24/23 101 kg (223 lb)   08/15/23 99.8 kg (220 lb)     Body mass index is 29.07 kg/m². Physical Exam  Vitals reviewed. Constitutional:       General: He is not in acute distress. Appearance: Normal appearance. He is well-developed. HENT:      Head: Normocephalic. Right Ear: External ear normal.      Left Ear: External ear normal.      Nose: Nose normal.      Mouth/Throat:      Pharynx: No oropharyngeal exudate. Eyes:      Conjunctiva/sclera: Conjunctivae normal.      Pupils: Pupils are equal, round, and reactive to light. Neck:      Vascular: No JVD. Trachea: No tracheal deviation. Cardiovascular:      Rate and Rhythm: Normal rate and regular rhythm. Heart sounds: Normal heart sounds. Pulmonary:      Effort: Pulmonary effort is normal.      Comments: Lung sounds are clear. No wheezes, crackles or rhonchi  Abdominal:      General: There is no distension. Palpations: Abdomen is soft. Tenderness: There is no abdominal tenderness.  There is no guarding. Musculoskeletal:      Cervical back: Neck supple. Comments: No edema, cyanosis or clubbing   Lymphadenopathy:      Cervical: No cervical adenopathy. Skin:     General: Skin is warm and dry. Findings: No rash. Neurological:      General: No focal deficit present. Mental Status: He is alert and oriented to person, place, and time. Psychiatric:         Behavior: Behavior normal.         Thought Content: Thought content normal.       Lab Review:   Lab Results   Component Value Date    K 4.8 08/17/2023     08/17/2023    CO2 30 08/17/2023    BUN 15 08/17/2023    CREATININE 1.36 (H) 08/17/2023    CALCIUM 9.7 08/17/2023     Lab Results   Component Value Date    WBC 7.81 08/17/2023    HGB 16.6 08/17/2023    HCT 50.2 (H) 08/17/2023    MCV 90 08/17/2023     08/17/2023       Diagnostics:  I have personally reviewed pertinent films in PACS  High-resolution CT of chest done 7/1/2023 was reviewed with patient. There are some diffuse coarsening of interstitial lung markings with some nodular and linear calcification noted bilaterally consistent with diffuse pulmonary ossification. Small hiatal hernia present. Even on CT scan of his abdomen done 410 on lower lungs visualized there is some increased markings opacities throughout both lower lobes. This was done with patient had gallstone pancreatitis and subsequently had cholecystectomy. Pulse oximetry testing:   Room air O2 saturation was 97% at rest and with going up and down a flight of stairs and walking 100 feet lowest O2 saturation was 95%    PFT done 7/18/23    FVC - 2.88 LL    55%  FEV1 - 2.10 L    53%  FEV1/FVC% - 73%  RV - 1.46 L    57%  TLC - 4.37 L   55%  DLCO - 53%    This shows moderate restrictive impairment. No significant change lung pharmacy to bronchodilator.   Diffusion capacity is moderately decreased       ESS: Total score: 9

## 2023-09-25 NOTE — PATIENT INSTRUCTIONS
-Schedule pulmonary function test and CT Chest in July 2024. Scheduling number [phone]    Have blood work performedLear [facility] Allergy panel, CRP, and CHELO. Back telephone number [phone]    Take Pepcid or Prilosec as needed for heartburn    Can use flonase, 2 sprays once a day, as needed for seasonal allergies. Can use Claritin and Allegra OTC for seasonal allergies as well.     Wagner Marroquin

## 2023-09-25 NOTE — ASSESSMENT & PLAN NOTE
Prescribed fluticasone nasal spray can use 2 sprays each nostril once a day as needed for postnasal drainage. Does have allergy symptoms.   Did order serum [facility] allergy panel to make and also try over-the-counter Claritin or Allegra

## 2023-09-26 ENCOUNTER — APPOINTMENT (OUTPATIENT)
Dept: PHYSICAL THERAPY | Facility: CLINIC | Age: 63
End: 2023-09-26
Payer: COMMERCIAL

## 2023-09-27 ENCOUNTER — OFFICE VISIT (OUTPATIENT)
Dept: PHYSICAL THERAPY | Facility: CLINIC | Age: 63
End: 2023-09-27
Payer: COMMERCIAL

## 2023-09-27 ENCOUNTER — APPOINTMENT (OUTPATIENT)
Dept: LAB | Facility: CLINIC | Age: 63
End: 2023-09-27
Payer: COMMERCIAL

## 2023-09-27 DIAGNOSIS — M75.42 IMPINGEMENT SYNDROME OF LEFT SHOULDER: ICD-10-CM

## 2023-09-27 DIAGNOSIS — M75.02 ADHESIVE CAPSULITIS OF LEFT SHOULDER: ICD-10-CM

## 2023-09-27 DIAGNOSIS — I10 PRIMARY HYPERTENSION: ICD-10-CM

## 2023-09-27 DIAGNOSIS — J30.2 SEASONAL ALLERGIES: ICD-10-CM

## 2023-09-27 DIAGNOSIS — S49.92XA SHOULDER INJURY, LEFT, INITIAL ENCOUNTER: Primary | ICD-10-CM

## 2023-09-27 DIAGNOSIS — J98.4 PULMONARY OSSIFICATION: ICD-10-CM

## 2023-09-27 LAB
ANA SER QL IA: NEGATIVE
CRP SERPL QL: 1.3 MG/L

## 2023-09-27 PROCEDURE — 86038 ANTINUCLEAR ANTIBODIES: CPT

## 2023-09-27 PROCEDURE — 86140 C-REACTIVE PROTEIN: CPT

## 2023-09-27 PROCEDURE — 97110 THERAPEUTIC EXERCISES: CPT

## 2023-09-27 PROCEDURE — 86003 ALLG SPEC IGE CRUDE XTRC EA: CPT

## 2023-09-27 PROCEDURE — 97140 MANUAL THERAPY 1/> REGIONS: CPT

## 2023-09-27 PROCEDURE — 82785 ASSAY OF IGE: CPT

## 2023-09-27 PROCEDURE — 36415 COLL VENOUS BLD VENIPUNCTURE: CPT

## 2023-09-27 RX ORDER — RAMIPRIL 5 MG/1
5 CAPSULE ORAL DAILY
Qty: 90 CAPSULE | Refills: 1 | Status: SHIPPED | OUTPATIENT
Start: 2023-09-27 | End: 2023-10-04

## 2023-09-27 NOTE — TELEPHONE ENCOUNTER
Pt came into office requesting refill of ramipril. He said he was told by Dr Briana Kunz to start taking 2 pills per day because of high bps. (see [employer] message 8/27)     Advised pt he was told a month ago to make an appt to discuss HTN and if increasing the dosage was appropriate. Pt did not make an appt. Scheduled for next Wed with Dr Lynne Chambers.      Dr Lynne Chambers- can you please review the refill request?

## 2023-09-27 NOTE — PROGRESS NOTES
Daily Note     Today's date: 2023  Patient name: Brie Cook  : 1960  MRN: [id number]  Referring provider: Shae Zayas MD  Dx:   Encounter Diagnosis     ICD-10-CM    1. Shoulder injury, left, initial encounter  S49. 92XA       2. Impingement syndrome of left shoulder  M75.42       3. Adhesive capsulitis of left shoulder  M75.02           Start Time: 1200  Stop Time: 1238  Total time in clinic (min): 38 minutes    Subjective: He reports no change in status since last visit. Objective: See treatment diary below      Assessment: Tolerated treatment well. Patient exhibited good technique with therapeutic exercises and would benefit from continued PT. Continued with established plan of care due to responding well to current program and he is maintaining his ROM by the end of the session. Plan: Continue per plan of care. Insurance:  AMA/CMS Eval/ Re-eval POC expires Dionneross Izquierdo #/ Referral # Total units  Start date  Expiration date Extension  Visit limitation? PT only or  PT+OT?  Co-Insurance   [facility], [facility] 2023 8 weeks - 2023 66 Auth # [id number] approved 12 visits           75            23 sent              sent             23 Mraion Carter #[id number] partially approved 6 visits 23              Date  6/7 6/13 6/15 6/22 6/27   Units:  Used 1 2 3 4 5 6 7 8 9 10 11 12   Authed:  Remaining  11 10 9 8 7 6 5 4 3 2 1 0        Date  7/ 8/3 8/7 8/10 8/14 8/17 8/21 8/24 8/28 8/31   Units:  Used 1 1 1 1 1 1 1 1 1 1 1 1   Authed:  Remaining  11 10 9 RE 8 7 6 5 4 3 2 RE 1 0      Date             Units:  Used 1 1 1            Authed:  Remaining  5 4 3              Patient provided verbal consent to treatment plan and recommended interventions.     Date    Visit Number  22 RE 23 24 25 26 27   Manual           GH mobs  Gr 3 mobs Gr 3 mobs Gr 3 mobs Gr 3 mobs Gr 3-4 mobs Gr 3-4 mobs Gr 3-4 mobs   Thoracic mobs             STM           Prone 90 abd distraction internal rotation 5x5-10s hold 10x5-10s hold 10x5-10s hold 10x5-10s hold 10x10s hold 10x10s hold 10x10s hold 10x10s hold   Neuro Re-Ed           Scap retraction                                                          TherEx           PROM 10' performed 8' performed 8' performed 8' performed 8' performed 8' performed 8' performed 8' performed   Pulleys           Posterior cap rollover 10sx10 10sx10 10sx10 10sx10  10sx10 10sx10    Assisted sleeper  10sx10 10sx10 10sx10 10sx10 10sx20 10sx20 10sx20 10sx20   Bridge + IR  x3    rev rev    90-90 ER + DB        PTOP 10x10s                  TherAct               Patient education w heat 5'  rev HEP and POC RE 5'   Rev HEP modifications Rev HEP and progressions Rev HEP and progressions Rev HEP and progressions                                                                   Gait Training                                                               Modalities               CP/MH [facility] 5'                    Precautions: none  Past Medical History:   Diagnosis Date   • Bradycardia 04/11/2019   • Calculus of gallbladder and bile duct with obstruction without cholecystitis    • Gallstone pancreatitis 04/10/2019    Added automatically from request for surgery [id number]   • GERD (gastroesophageal reflux disease)    • Hematemesis 04/11/2019   • Hypertension    • Pancreatitis     blocked by gall stones   • Wears glasses

## 2023-09-28 ENCOUNTER — APPOINTMENT (OUTPATIENT)
Dept: PHYSICAL THERAPY | Facility: CLINIC | Age: 63
End: 2023-09-28
Payer: COMMERCIAL

## 2023-10-02 LAB

## 2023-10-04 ENCOUNTER — OFFICE VISIT (OUTPATIENT)
Dept: FAMILY MEDICINE CLINIC | Facility: CLINIC | Age: 63
End: 2023-10-04
Payer: COMMERCIAL

## 2023-10-04 ENCOUNTER — OFFICE VISIT (OUTPATIENT)
Dept: PHYSICAL THERAPY | Facility: CLINIC | Age: 63
End: 2023-10-04
Payer: COMMERCIAL

## 2023-10-04 VITALS
HEART RATE: 70 BPM | SYSTOLIC BLOOD PRESSURE: 126 MMHG | WEIGHT: 225 LBS | OXYGEN SATURATION: 99 % | RESPIRATION RATE: 16 BRPM | BODY MASS INDEX: 28.89 KG/M2 | DIASTOLIC BLOOD PRESSURE: 80 MMHG

## 2023-10-04 DIAGNOSIS — I10 PRIMARY HYPERTENSION: ICD-10-CM

## 2023-10-04 DIAGNOSIS — M75.02 ADHESIVE CAPSULITIS OF LEFT SHOULDER: ICD-10-CM

## 2023-10-04 DIAGNOSIS — M75.42 IMPINGEMENT SYNDROME OF LEFT SHOULDER: ICD-10-CM

## 2023-10-04 DIAGNOSIS — S49.92XA SHOULDER INJURY, LEFT, INITIAL ENCOUNTER: Primary | ICD-10-CM

## 2023-10-04 DIAGNOSIS — Z79.899 ON PRE-EXPOSURE PROPHYLAXIS FOR HIV: ICD-10-CM

## 2023-10-04 DIAGNOSIS — J98.4 PULMONARY OSSIFICATION: Primary | ICD-10-CM

## 2023-10-04 PROCEDURE — 99214 OFFICE O/P EST MOD 30 MIN: CPT | Performed by: FAMILY MEDICINE

## 2023-10-04 PROCEDURE — 97140 MANUAL THERAPY 1/> REGIONS: CPT

## 2023-10-04 PROCEDURE — 97110 THERAPEUTIC EXERCISES: CPT

## 2023-10-04 RX ORDER — RAMIPRIL 10 MG/1
10 CAPSULE ORAL DAILY
Qty: 90 CAPSULE | Refills: 3 | Status: SHIPPED | OUTPATIENT
Start: 2023-10-04

## 2023-10-04 NOTE — PROGRESS NOTES
Daily Note     Today's date: 10/4/2023  Patient name: Shakira Vences  : 1960  MRN: [id number]  Referring provider: Sallie Dubin, MD  Dx:   Encounter Diagnosis     ICD-10-CM    1. Shoulder injury, left, initial encounter  S49. 92XA       2. Impingement syndrome of left shoulder  M75.42       3. Adhesive capsulitis of left shoulder  M75.02           Start Time: 1400  Stop Time: 1435  Total time in clinic (min): 35 minutes    Subjective: He reports no change in status since last visit. Objective: See treatment diary below      Assessment: Tolerated treatment well. Patient exhibited good technique with therapeutic exercises and would benefit from continued PT. Continued with established plan of care due to responding well to current program and he is maintaining his ROM by the end of the session. Plan: Continue per plan of care. Insurance:  AMA/CMS Eval/ Re-eval POC expires Sunshine Miller #/ Referral # Total units  Start date  Expiration date Extension  Visit limitation? PT only or  PT+OT?  Co-Insurance   [facility], [facility] 2023 8 weeks - 2023 66 Auth # [id number] approved 12 visits           75            23 sent              sent             23 [place] #[id number] partially approved 6 visits 23              Date 4/27 5/1 5/23 5/25 5/30 6/1 6/5 6/7 6/13 6/15 6/22 6/27   Units:  Used 1 2 3 4 5 6 7 8 9 10 11 12   Authed:  Remaining  11 10 9 8 7 6 5 4 3 2 1 0        Date  7/6  8/3 8/7 8/10 8/14 8/17 8/21 8/24 8/28 8/31   Units:  Used 1 1 1 1 1 1 1 1 1 1 1 1   Authed:  Remaining  11 10 9 RE 8 7 6 5 4 3 2 RE 1 0      Date             Units:  Used 1 1 1            Authed:  Remaining  5 4 3              Patient provided verbal consent to treatment plan and recommended interventions.     Date    Visit Number 20 21 22 RE 23 24 25 26 27   Manual           GH mobs  Gr 3 mobs Gr 3 mobs Gr 3 mobs Gr 3 mobs Gr 3-4 mobs Gr 3-4 mobs Gr 3-4 mobs   Thoracic mobs             STM           Prone 90 abd distraction internal rotation 5x5-10s hold 10x5-10s hold 10x5-10s hold 10x5-10s hold 10x10s hold 10x10s hold 10x10s hold 10x10s hold   Neuro Re-Ed           Scap retraction                                                          TherEx           PROM 10' performed 8' performed 8' performed 8' performed 8' performed 8' performed 8' performed 8' performed   Pulleys           Posterior cap rollover 10sx10 10sx10 10sx10 10sx10  10sx10 10sx10    Assisted sleeper  10sx10 10sx10 10sx10 10sx10 10sx20 10sx20 10sx20 10sx20   Bridge + IR  x3    rev rev    90-90 ER + DB        PTOP 10x10s                  TherAct               Patient education w heat 5'  rev HEP and POC RE 5'   Rev HEP modifications Rev HEP and progressions Rev HEP and progressions Rev HEP and progressions                                                                   Gait Training                                                               Modalities               CP/MH [facility] 5'                    Precautions: none  Past Medical History:   Diagnosis Date   • Bradycardia 2019   • Calculus of gallbladder and bile duct with obstruction without cholecystitis    • Gallstone pancreatitis 04/10/2019    Added automatically from request for surgery [id number]   • GERD (gastroesophageal reflux disease)    • Hematemesis 2019   • Hypertension    • Pancreatitis     blocked by gall stones   • Wears glasses                      Daily Note     Today's date: 10/4/2023  Patient name: Jaiden Wise  : 1960  MRN: [id number]  Referring provider: Stephen Duncan MD  Dx:   Encounter Diagnosis     ICD-10-CM    1. Shoulder injury, left, initial encounter  S49. 92XA       2. Impingement syndrome of left shoulder  M75.42       3.  Adhesive capsulitis of left shoulder  M75.02           Start Time: 1400  Stop Time: 1435  Total time in clinic (min): 35 minutes    Subjective: He reports he feels a bit tighter today than he has been, but he has been busy cleaning up for fall and doing a lot of yard work. Good compliance with flexion and abduction HEP. Objective: See treatment diary below      Assessment: Tolerated treatment well. Patient exhibited good technique with therapeutic exercises and would benefit from continued PT. Continued with established plan of care due to responding well to current program and he is maintaining his ROM by the end of the session. Focused on IR and ER with vast improvements in IR bridge noted by EOS. Plan: Continue per plan of care. Insurance:  AMA/CMS Eval/ Re-eval POC expires Aisha Records #/ Referral # Total units  Start date  Expiration date Extension  Visit limitation? PT only or  PT+OT?  Co-Insurance   [facility], [facility] 4/27/2023 8 weeks - 6/22/2023 66 Auth # [id number] approved 12 visits 4/27 7/31          75            6/22/23 sent             7/31 sent             8/24/23 Damian Wood #[id number] partially approved 6 visits 9/5/23 12/5/23              Date 4/27 5/1 5/23 5/25 5/30 6/1 6/5 6/7 6/13 6/15 6/22 6/27   Units:  Used 1 2 3 4 5 6 7 8 9 10 11 12   Authed:  Remaining  11 10 9 8 7 6 5 4 3 2 1 0        Date 6/29 7/6 7/31 8/3 8/7 8/10 8/14 8/17 8/21 8/24 8/28 8/31   Units:  Used 1 1 1 1 1 1 1 1 1 1 1 1   Authed:  Remaining  11 10 9 RE 8 7 6 5 4 3 2 RE 1 0      Date 9/11 9/19 9/27 10/4           Units:  Used 1 1 1 1           Authed:  Remaining  5 4 3 2             Patient provided verbal consent to treatment plan and recommended interventions.     Date 8/24 8/28 8/31 9/11 9/19 9/27 10/4   Visit Number 22 RE 23 24 25 26 27 28   Manual          GH mobs Gr 3 mobs Gr 3 mobs Gr 3 mobs Gr 3-4 mobs Gr 3-4 mobs Gr 3-4 mobs Gr 3-4 mobs   Thoracic mobs          STM          Prone 90 abd distraction internal rotation 10x5-10s hold 10x5-10s hold 10x10s hold 10x10s hold 10x10s hold 10x10s hold 10x10s hold   Neuro Re-Ed          Scap retraction                                            TherEx          PROM 8' performed 8' performed 8' performed 8' performed 8' performed 8' performed 8' performed   Pulleys          Posterior cap rollover 10sx10 10sx10  10sx10 10sx10  10x10s   Assisted sleeper  10sx10 10sx10 10sx20 10sx20 10sx20 10sx20 10x10s   Bridge + IR    rev rev  x10 5s hold   90-90 ER + DB      PTOP 10x10s                  TherAct            Patient education RE 11'   Rev HEP modifications Rev HEP and progressions Rev HEP and progressions Rev HEP and progressions Rev HEP and progressions                                                       Gait Training                                                   Modalities            CP/MH                   Precautions: none  Past Medical History:   Diagnosis Date   • Bradycardia 04/11/2019   • Calculus of gallbladder and bile duct with obstruction without cholecystitis    • Gallstone pancreatitis 04/10/2019    Added automatically from request for surgery [id number]   • GERD (gastroesophageal reflux disease)    • Hematemesis 04/11/2019   • Hypertension    • Pancreatitis     blocked by gall stones   • Wears glasses

## 2023-10-04 NOTE — PROGRESS NOTES
ASSESSMENT/PLAN:  1. Pulmonary ossification  This meet be related to his GERD with microaspiration's. His lungs are clear and his O2 sat is fine. We discussed lifestyle medicine approaches to reducing GERD symptoms. And he is to continue his famotidine. 2. Primary hypertension    - ramipril (ALTACE) 10 MG capsule; Take 1 capsule (10 mg total) by mouth daily  Dispense: 90 capsule; Refill: 3    3. On pre-exposure prophylaxis for HIV  Continue same Truvada. Chief Complaint   Patient presents with   • Follow-up     HTN           Reviewed and reinforced basics of healthy lifestyle  Risks and benefits of therapeutic plan discussed, answered all patient questions and concerns and patient expressed understanding and agreement of therapeutic plan. Return in about 2 months (around 12/4/2023) for Chronic dis. f/u 1/2 hr..        SUBJECTIVE:  Chief complaint and HPI: Zach Fountain is a 61 y.o. male who presents for follow up of multiple chronic medical problems, as listed below in problem list. All problems are relatively stable except for the following issues: HTN  Pulmonary ossification      Review of systems:  No fever/chills/sweats/unexplained weight loss  No chest pain/shortness of breath  No change in digestion or bowel movements  No change in urination  No new musculoskeletal or neurological symptoms      Chart reviewed for relevant medical, surgical and psychosocial history, medications and allergies, labs and studies. Patient Active Problem List   Diagnosis   • Shoulder injury, left, initial encounter   • Gastroesophageal reflux disease without esophagitis   • Primary hypertension   • Hemorrhoids   • On pre-exposure prophylaxis for HIV   • Pulmonary ossification   • Bilateral tinnitus   • Post-nasal drip   • Sensorineural hearing loss (SNHL) of left ear with unrestricted hearing of right ear   • Tick bite   • Seasonal allergies   • Abnormal CT of the chest       BMI Counseling:  There is no height or weight on file to calculate BMI. The BMI is above normal. Nutrition recommendations include encouraging healthy choices of fruits and vegetables, decreasing fast food intake and consuming healthier snacks. Exercise recommendations include moderate physical activity 150 minutes/week. Rationale for BMI follow-up plan is due to patient being overweight or obese. EXAM:    The patient appears well, in no apparent distress. Alert and oriented times three, pleasant and cooperative. Vital signs are as noted by the nurse. /80 Comment: done manually  Pulse 70   Resp 16   Wt 102 kg (225 lb)   SpO2 99%   BMI 28.89 kg/m²     Head: normocephalic, atraumatic  Eyes: well perfused conjunctiva, not clinically pale, not jaundiced  Ears: external ear: no gross lesions  Nose: no rhinorrhea  Neck: supple, trachea in the midline and no concerning cervical lymphadenopathy  Lungs: No respiratory labor. Clear to auscultation  Heart: Regular rate and rhythm, no murmurs, gallops or rubs  Abdomen: Benign: soft, non-tender, non-distended. No guarding or rebound. No masses or organomegaly. Normal bowel sounds,   Skin: No pallor. No rashes noted  Extremities: Moves all extremities normally.  No pedal edema  Neuro: Grossly normal

## 2023-10-10 ENCOUNTER — OFFICE VISIT (OUTPATIENT)
Dept: PHYSICAL THERAPY | Facility: CLINIC | Age: 63
End: 2023-10-10
Payer: COMMERCIAL

## 2023-10-10 DIAGNOSIS — M75.02 ADHESIVE CAPSULITIS OF LEFT SHOULDER: ICD-10-CM

## 2023-10-10 DIAGNOSIS — M75.42 IMPINGEMENT SYNDROME OF LEFT SHOULDER: ICD-10-CM

## 2023-10-10 DIAGNOSIS — S49.92XA SHOULDER INJURY, LEFT, INITIAL ENCOUNTER: Primary | ICD-10-CM

## 2023-10-10 PROCEDURE — 97110 THERAPEUTIC EXERCISES: CPT

## 2023-10-10 PROCEDURE — 97140 MANUAL THERAPY 1/> REGIONS: CPT

## 2023-10-10 NOTE — PROGRESS NOTES
Daily Note     Today's date: 10/10/2023  Patient name: Jaskaran Noriega  : 1960  MRN: [id number]  Referring provider: Fredo Miranda MD  Dx:   Encounter Diagnosis     ICD-10-CM    1. Shoulder injury, left, initial encounter  S49. 92XA       2. Impingement syndrome of left shoulder  M75.42       3. Adhesive capsulitis of left shoulder  M75.02           Start Time: 1015  Stop Time: 1045  Total time in clinic (min): 30 minutes    Subjective: He reports no change in status since last visit. Objective: See treatment diary below      Assessment: Tolerated treatment well. Patient exhibited good technique with therapeutic exercises and would benefit from continued PT. Continued with established plan of care due to responding well to current program and he is maintaining his ROM by the end of the session. Plan: Continue per plan of care. Insurance:  AMA/CMS Eval/ Re-eval POC expires Sheryl Handler #/ Referral # Total units  Start date  Expiration date Extension  Visit limitation? PT only or  PT+OT?  Co-Insurance   [facility], [facility] 2023 8 weeks - 2023 66 Auth # [id number] approved 12 visits           75            23 sent              sent             23 [facility] #[id number] partially approved 6 visits 23              Date 4/27 5/1 5/23 5/25 5/30 6/1 6/5 6/7 6/13 6/15 6/22 6/27   Units:  Used 1 2 3 4 5 6 7 8 9 10 11 12   Authed:  Remaining  11 10 9 8 7 6 5 4 3 2 1 0        Date  7/6  8/3 8/7 8/10 8/14 8/17 8/21 8/24 8/28 8/31   Units:  Used 1 1 1 1 1 1 1 1 1 1 1 1   Authed:  Remaining  11 10 9 RE 8 7 6 5 4 3 2 RE 1 0      Date             Units:  Used 1 1 1            Authed:  Remaining  5 4 3              Patient provided verbal consent to treatment plan and recommended interventions.     Date    Visit Number 20 21 22 RE 23 24 25 26 27   Manual           GH mobs  Gr 3 mobs Gr 3 mobs Gr 3 mobs Gr 3 mobs Gr 3-4 mobs Gr 3-4 mobs Gr 3-4 mobs   Thoracic mobs             STM           Prone 90 abd distraction internal rotation 5x5-10s hold 10x5-10s hold 10x5-10s hold 10x5-10s hold 10x10s hold 10x10s hold 10x10s hold 10x10s hold   Neuro Re-Ed           Scap retraction                                                          TherEx           PROM 10' performed 8' performed 8' performed 8' performed 8' performed 8' performed 8' performed 8' performed   Pulleys           Posterior cap rollover 10sx10 10sx10 10sx10 10sx10  10sx10 10sx10    Assisted sleeper  10sx10 10sx10 10sx10 10sx10 10sx20 10sx20 10sx20 10sx20   Bridge + IR  x3    rev rev    90-90 ER + DB        PTOP 10x10s                  TherAct               Patient education w heat 5'  rev HEP and POC RE 5'   Rev HEP modifications Rev HEP and progressions Rev HEP and progressions Rev HEP and progressions                                                                   Gait Training                                                               Modalities               CP/MH [facility] 5'                    Precautions: none  Past Medical History:   Diagnosis Date   • Bradycardia 2019   • Calculus of gallbladder and bile duct with obstruction without cholecystitis    • Gallstone pancreatitis 04/10/2019    Added automatically from request for surgery [id number]   • GERD (gastroesophageal reflux disease)    • Hematemesis 2019   • Hypertension    • Pancreatitis     blocked by gall stones   • Wears glasses                      Daily Note     Today's date: 10/10/2023  Patient name: Luther Watson  : 1960  MRN: [id number]  Referring provider: Bev Magdaleno MD  Dx:   Encounter Diagnosis     ICD-10-CM    1. Shoulder injury, left, initial encounter  S49. 92XA       2. Impingement syndrome of left shoulder  M75.42       3.  Adhesive capsulitis of left shoulder  M75.02           Start Time: 1015  Stop Time: 1045  Total time in clinic (min): 30 minutes    Subjective: He reports he feels comfortable in completing his HEP at home and is maintaining his ROM      Objective: See treatment diary below      Assessment: Tolerated treatment well. Patient exhibited good technique with therapeutic exercises and would benefit from continued PT. Continued with established plan of care due to responding well to current program and he is maintaining his ROM by the end of the session. Plan: Potential discharge next visit. Insurance:  AMA/CMS Eval/ Re-eval POC expires Vincent Will #/ Referral # Total units  Start date  Expiration date Extension  Visit limitation? PT only or  PT+OT?  Co-Insurance   [facility], [facility] 4/27/2023 8 weeks - 6/22/2023 66 Auth # [id number] approved 12 visits 4/27 7/31          75            6/22/23 sent             7/31 sent             8/24/23 Pratibha Nye #[id number] partially approved 6 visits 9/5/23 12/5/23              Date 4/27 5/1 5/23 5/25 5/30 6/1 6/5 6/7 6/13 6/15 6/22 6/27   Units:  Used 1 2 3 4 5 6 7 8 9 10 11 12   Authed:  Remaining  11 10 9 8 7 6 5 4 3 2 1 0        Date 6/29 7/6 7/31 8/3 8/7 8/10 8/14 8/17 8/21 8/24 8/28 8/31   Units:  Used 1 1 1 1 1 1 1 1 1 1 1 1   Authed:  Remaining  11 10 9 RE 8 7 6 5 4 3 2 RE 1 0      Date 9/11 9/19 9/27 10/4 10/10          Units:  Used 1 1 1 1 1          Authed:  Remaining  5 4 3 2 1            Patient provided verbal consent to treatment plan and recommended interventions.     Date 8/24 8/28 8/31 9/11 9/19 9/27 10/4 10/10   Visit Number 22 RE 23 24 25 26 27 28 29   Manual           GH mobs Gr 3 mobs Gr 3 mobs Gr 3 mobs Gr 3-4 mobs Gr 3-4 mobs Gr 3-4 mobs Gr 3-4 mobs Gr 3-4 mobs   Thoracic mobs           STM           Prone 90 abd distraction internal rotation 10x5-10s hold 10x5-10s hold 10x10s hold 10x10s hold 10x10s hold 10x10s hold 10x10s hold 10x10s hold   Neuro Re-Ed           Scap retraction                                                          TherEx           PROM 8' performed 8' performed 8' performed 8' performed 8' performed 8' performed 8' performed 8' performed   Pulleys           Posterior cap rollover 10sx10 10sx10  10sx10 10sx10  10x10s 10x10s   Assisted sleeper  10sx10 10sx10 10sx20 10sx20 10sx20 10sx20 10x10s 10x10s   Bridge + IR    rev rev  x10 5s hold x10 5s hold   90-90 ER + DB      PTOP 10x10s                    TherAct             Patient education RE 11'   Rev HEP modifications Rev HEP and progressions Rev HEP and progressions Rev HEP and progressions Rev HEP and progressions Rev HEP and POC                                                           Gait Training                                                       Modalities             CP/MH                    Precautions: none  Past Medical History:   Diagnosis Date   • Bradycardia 04/11/2019   • Calculus of gallbladder and bile duct with obstruction without cholecystitis    • Gallstone pancreatitis 04/10/2019    Added automatically from request for surgery [id number]   • GERD (gastroesophageal reflux disease)    • Hematemesis 04/11/2019   • Hypertension    • Pancreatitis     blocked by gall stones   • Wears glasses

## 2023-10-17 ENCOUNTER — OFFICE VISIT (OUTPATIENT)
Dept: PHYSICAL THERAPY | Facility: CLINIC | Age: 63
End: 2023-10-17
Payer: COMMERCIAL

## 2023-10-17 DIAGNOSIS — S49.92XA SHOULDER INJURY, LEFT, INITIAL ENCOUNTER: Primary | ICD-10-CM

## 2023-10-17 DIAGNOSIS — M75.02 ADHESIVE CAPSULITIS OF LEFT SHOULDER: ICD-10-CM

## 2023-10-17 DIAGNOSIS — M75.42 IMPINGEMENT SYNDROME OF LEFT SHOULDER: ICD-10-CM

## 2023-10-17 PROCEDURE — 97140 MANUAL THERAPY 1/> REGIONS: CPT

## 2023-10-17 PROCEDURE — 97110 THERAPEUTIC EXERCISES: CPT

## 2023-10-17 NOTE — PROGRESS NOTES
Daily Note     Today's date: 10/17/2023  Patient name: Edward Reid  : 1960  MRN: [id number]  Referring provider: Macho Cavazos MD  Dx:   Encounter Diagnosis     ICD-10-CM    1. Shoulder injury, left, initial encounter  S49. 92XA       2. Impingement syndrome of left shoulder  M75.42       3. Adhesive capsulitis of left shoulder  M75.02           Start Time: 1015  Stop Time: 1045  Total time in clinic (min): 30 minutes    Subjective: He reports he feels comfortable in completing his HEP at home and is maintaining his ROM      Objective: See treatment diary below      Assessment: Tolerated treatment well. Goals have been met, HEP provided and reviewed. Patient is discharged from skilled therapy at this time. Plan: discharge       Insurance:  AMA/CMS Eval/ Re-eval POC expires Elisha Stovall #/ Referral # Total units  Start date  Expiration date Extension  Visit limitation? PT only or  PT+OT? Co-Insurance   [facility], [facility] 2023 8 weeks - 2023 66 Auth # [id number] approved 12 visits           75            23 sent              sent             23 Cayla Garrido #[id number] partially approved 6 visits 23              Date 4/27 5/1 5/23 5/25 5/30 6/1 6/5 6/7 6/13 6/15 6/22 6/27   Units:  Used 1 2 3 4 5 6 7 8 9 10 11 12   Authed:  Remaining  11 10 9 8 7 6 5 4 3 2 1 0        Date 6/29 7/6 7/31 8/3 8/7 8/10 8/14 8/17 8/21 8/24 8/28 8/31   Units:  Used 1 1 1 1 1 1 1 1 1 1 1 1   Authed:  Remaining  11 10 9 RE 8 7 6 5 4 3 2 RE 1 0      Date 9/11 9/19 9/27 10/4 10/10 10/17         Units:  Used 1 1 1 1 1 1         Authed:  Remaining  5 4 3 2 1 0           Patient provided verbal consent to treatment plan and recommended interventions.      Date 8/31 9/11 9/19 9/27 10/4 10/10 10/17   Visit Number [phone] 30   Manual          GH mobs Gr 3 mobs Gr 3-4 mobs Gr 3-4 mobs Gr 3-4 mobs Gr 3-4 mobs Gr 3-4 mobs Gr 3-4 mobs   Thoracic mobs          STM          Prone 90 abd distraction internal rotation 10x10s hold 10x10s hold 10x10s hold 10x10s hold 10x10s hold 10x10s hold 10x10s hold   Neuro Re-Ed          Scap retraction                                                     TherEx          PROM 8' performed 8' performed 8' performed 8' performed 8' performed 8' performed 8' performed   Pulleys          Posterior cap rollover  10sx10 10sx10  10x10s 10x10s 10x10s   Assisted sleeper  10sx20 10sx20 10sx20 10sx20 10x10s 10x10s 10x10s   Bridge + IR  rev rev  x10 5s hold x10 5s hold x10 5s hold   90-90 ER + DB    PTOP 10x10s                    TherAct          Patient education Rev HEP modifications Rev HEP and progressions Rev HEP and progressions Rev HEP and progressions Rev HEP and progressions Rev HEP and POC Rev HEP and POC                                               Gait Training                                           Modalities          CP/MH                 Precautions: none  Past Medical History:   Diagnosis Date   • Bradycardia 04/11/2019   • Calculus of gallbladder and bile duct with obstruction without cholecystitis    • Gallstone pancreatitis 04/10/2019    Added automatically from request for surgery [id number]   • GERD (gastroesophageal reflux disease)    • Hematemesis 04/11/2019   • Hypertension    • Pancreatitis     blocked by gall stones   • Wears glasses

## 2023-10-23 ENCOUNTER — TELEPHONE (OUTPATIENT)
Dept: FAMILY MEDICINE CLINIC | Facility: CLINIC | Age: 63
End: 2023-10-23

## 2023-10-23 DIAGNOSIS — I10 PRIMARY HYPERTENSION: ICD-10-CM

## 2023-10-23 RX ORDER — HYDROCHLOROTHIAZIDE 25 MG/1
25 TABLET ORAL DAILY
Qty: 30 TABLET | Refills: 0 | Status: SHIPPED | OUTPATIENT
Start: 2023-10-23

## 2023-10-23 NOTE — TELEPHONE ENCOUNTER
Sean on clinical line:     Hello, this is [employer] calling. I'm a patient of [facility] and I have blood pressure issues and today my blood pressure has gone way, way up and it's not coming down. It started off in the one fifties first thing in the morning and it's been in the one 70s for the last two readings. I have taken my blood pressure medication and I want to know if I need to be concerned about that because it usually is not this high at home. So my number is [phone]. If you can have the doctor call me, I would appreciate that. Thank you very much. Dr Mau Rojo- can you please call & triage pt to see what he should be looking out for? Please let us know if an appt is necessary.

## 2023-11-09 ENCOUNTER — OFFICE VISIT (OUTPATIENT)
Dept: FAMILY MEDICINE CLINIC | Facility: CLINIC | Age: 63
End: 2023-11-09
Payer: COMMERCIAL

## 2023-11-09 VITALS
HEIGHT: 74 IN | OXYGEN SATURATION: 98 % | RESPIRATION RATE: 21 BRPM | TEMPERATURE: 98.3 F | DIASTOLIC BLOOD PRESSURE: 80 MMHG | BODY MASS INDEX: 29.2 KG/M2 | SYSTOLIC BLOOD PRESSURE: 144 MMHG | WEIGHT: 227.56 LBS | HEART RATE: 76 BPM

## 2023-11-09 DIAGNOSIS — I10 PRIMARY HYPERTENSION: Primary | ICD-10-CM

## 2023-11-09 DIAGNOSIS — N52.2 DRUG-INDUCED ERECTILE DYSFUNCTION: ICD-10-CM

## 2023-11-09 DIAGNOSIS — R79.89 AZOTEMIA: ICD-10-CM
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
## 2023-11-09 PROCEDURE — 99214 OFFICE O/P EST MOD 30 MIN: CPT | Performed by: FAMILY MEDICINE

## 2023-11-09 RX ORDER — TADALAFIL 5 MG/1
5 TABLET ORAL DAILY PRN
Qty: 15 TABLET | Refills: 3 | Status: SHIPPED | OUTPATIENT
Start: 2023-11-09

## 2023-11-09 RX ORDER — RAMIPRIL 10 MG/1
10 CAPSULE ORAL 2 TIMES DAILY
Qty: 180 CAPSULE | Refills: 3 | Status: SHIPPED | OUTPATIENT
Start: 2023-11-09

## 2023-11-09 NOTE — PROGRESS NOTES
ASSESSMENT/PLAN:  1. Primary hypertension  Healthy lifestyle   - ramipril (ALTACE) 10 MG capsule; Take 1 capsule (10 mg total) by mouth 2 (two) times a day  Dispense: 180 capsule; Refill: 3    2. Drug-induced erectile dysfunction    - tadalafil (CIALIS) 5 MG tablet; Take 1 tablet (5 mg total) by mouth daily as needed for erectile dysfunction  Dispense: 15 tablet; Refill: 3      Monitoring mild azotemia. IF this worsens, may need further w/u. Chief Complaint   Patient presents with   • Follow-up     Patient has several things he would like to discuss with the Dr. Arin Waldron and reinforced basics of healthy lifestyle  Risks and benefits of therapeutic plan discussed, answered all patient questions and concerns and patient expressed understanding and agreement of therapeutic plan. Return in about 3 months (around 2/9/2024) for Chronic dis. f/u 1/2 hr..          SUBJECTIVE:  Chief complaint and HPI: Corina Feliciano is a 61 y.o. male who presents for follow up of multiple chronic medical problems, as listed below in problem list. All problems are relatively stable except for the following issues: home readings: 130 - 150/80 - 90's    Had his Cr checked elsewhere in [address] [place] [address] clinic at [facility], where gets Truvada. They monitor HIV. Was elevated in August.       Review of systems:  No fever/chills/sweats/unexplained weight loss  No chest pain/shortness of breath  No change in digestion or bowel movements  No change in urination  No new musculoskeletal or neurological symptoms      Chart reviewed for relevant medical, surgical and psychosocial history, medications and allergies, labs and studies.     Patient Active Problem List   Diagnosis   • Shoulder injury, left, initial encounter   • Gastroesophageal reflux disease without esophagitis   • Primary hypertension   • Hemorrhoids   • On pre-exposure prophylaxis for HIV   • Pulmonary ossification   • Bilateral tinnitus   • Post-nasal drip   • Sensorineural hearing loss (SNHL) of left ear with unrestricted hearing of right ear   • Seasonal allergies   • Abnormal CT of the chest             EXAM:    The patient appears well, in no apparent distress. Alert and oriented times three, pleasant and cooperative. Vital signs are as noted by the nurse. /80 (BP Location: Left arm, Patient Position: Sitting, Cuff Size: Large)   Pulse 76   Temp 98.3 °F (36.8 °C) (Temporal)   Resp 21   Ht 6' 2" (1.88 m)   Wt 103 kg (227 lb 9 oz)   SpO2 98%   BMI 29.22 kg/m²     Head: normocephalic, atraumatic  Eyes: well perfused conjunctiva, not clinically pale, not jaundiced  Ears: external ear: no gross lesions  Nose: no rhinorrhea  Neck: supple, trachea in the midline and no concerning cervical lymphadenopathy  Lungs: No respiratory labor. Clear to auscultation  Heart: Regular rate and rhythm, no murmurs, gallops or rubs  Abdomen: Benign: soft, non-tender, non-distended. No guarding or rebound. No masses or organomegaly. Normal bowel sounds,   Skin: No pallor. No rashes noted  Extremities: Moves all extremities normally.  No pedal edema  Neuro: Grossly normal

## 2023-12-11 DIAGNOSIS — I10 PRIMARY HYPERTENSION: ICD-10-CM

## 2023-12-11 RX ORDER — RAMIPRIL 10 MG/1
10 CAPSULE ORAL 2 TIMES DAILY
Qty: 180 CAPSULE | Refills: 0 | Status: SHIPPED | OUTPATIENT
Start: 2023-12-11

## 2024-02-29 DIAGNOSIS — I10 PRIMARY HYPERTENSION: ICD-10-CM

## 2024-03-01 DIAGNOSIS — J98.4 PULMONARY OSSIFICATION: ICD-10-CM

## 2024-03-01 DIAGNOSIS — I10 PRIMARY HYPERTENSION: Primary | ICD-10-CM

## 2024-03-01 DIAGNOSIS — Z79.899 ON PRE-EXPOSURE PROPHYLAXIS FOR HIV: ICD-10-CM

## 2024-03-01 RX ORDER — RAMIPRIL 10 MG/1
10 CAPSULE ORAL 2 TIMES DAILY
Qty: 180 CAPSULE | Refills: 0 | Status: SHIPPED | OUTPATIENT
Start: 2024-03-01

## 2024-04-25 ENCOUNTER — APPOINTMENT (OUTPATIENT)
Dept: LAB | Facility: CLINIC | Age: 64
End: 2024-04-25
Payer: COMMERCIAL

## 2024-04-25 DIAGNOSIS — Z79.899 ON PRE-EXPOSURE PROPHYLAXIS FOR HIV: ICD-10-CM

## 2024-04-25 DIAGNOSIS — R79.89 AZOTEMIA: Primary | ICD-10-CM

## 2024-04-25 DIAGNOSIS — I10 ESSENTIAL HYPERTENSION, BENIGN: ICD-10-CM

## 2024-04-25 LAB
ALBUMIN SERPL BCP-MCNC: 4.4 G/DL (ref 3.5–5)
ALP SERPL-CCNC: 44 U/L (ref 34–104)
ALT SERPL W P-5'-P-CCNC: 18 U/L (ref 7–52)
ANION GAP SERPL CALCULATED.3IONS-SCNC: 7 MMOL/L (ref 4–13)
AST SERPL W P-5'-P-CCNC: 20 U/L (ref 13–39)
BILIRUB SERPL-MCNC: 0.71 MG/DL (ref 0.2–1)
BUN SERPL-MCNC: 16 MG/DL (ref 5–25)
CALCIUM SERPL-MCNC: 9 MG/DL (ref 8.4–10.2)
CHLORIDE SERPL-SCNC: 103 MMOL/L (ref 96–108)
CO2 SERPL-SCNC: 29 MMOL/L (ref 21–32)
CREAT SERPL-MCNC: 1.14 MG/DL (ref 0.6–1.3)
GFR SERPL CREATININE-BSD FRML MDRD: 67 ML/MIN/1.73SQ M
GLUCOSE P FAST SERPL-MCNC: 100 MG/DL (ref 65–99)
POTASSIUM SERPL-SCNC: 4.9 MMOL/L (ref 3.5–5.3)
PROT SERPL-MCNC: 6.5 G/DL (ref 6.4–8.4)
SODIUM SERPL-SCNC: 139 MMOL/L (ref 135–147)

## 2024-04-25 PROCEDURE — 36415 COLL VENOUS BLD VENIPUNCTURE: CPT

## 2024-04-25 PROCEDURE — 83735 ASSAY OF MAGNESIUM: CPT

## 2024-04-25 PROCEDURE — 87389 HIV-1 AG W/HIV-1&-2 AB AG IA: CPT

## 2024-04-25 PROCEDURE — 80053 COMPREHEN METABOLIC PANEL: CPT

## 2024-04-30 LAB — MAGNESIUM RBC-MCNC: 5.7 MG/DL (ref 3.7–7)

## 2024-05-07 NOTE — PROGRESS NOTES
ADULT ANNUAL PHYSICAL  [facility]    1. Primary hypertension  Blood pressure 1 grossly better controlled with improved lifestyle and self-care.  He is slowly weaning down on his ramipril which she can continue to do trying to get down to 10 mg daily as long as monitor his home blood pressure closely and reports if in excess of 140/90.    2. Pulmonary ossification  He has no respiratory symptoms and will follow-up with Dr. Wolf in August on this    3. Gastroesophageal reflux disease without esophagitis  This is well-controlled with much better lifestyle and he only rarely needs his Pepcid.    4. On pre-exposure prophylaxis for HIV  He is not sexually active at the moment and therefore is not taking his Truvada.  He knows to restart prior to future sexual activity    5. Bilateral tinnitus  This is mild and tolerable and he just ignores it.    Return in about 3 months (around 2024) for Chronic dis. f/u 1/2 hr..  Will review need for his ramipril and how he is doing on his healthy lifestyle change.      NAME: Leo Melvin  AGE: 64 y.o. SEX: male  : 1960   DATE: 2024    Medical chart reviewed  WBC   Date/Time Value Ref Range Status   2023 09:26 AM 7.81 4.31 - 10.16 Thousand/uL Final     Hemoglobin   Date/Time Value Ref Range Status   2023 09:26 AM 16.6 12.0 - 17.0 g/dL Final     Hematocrit   Date/Time Value Ref Range Status   2023 09:26 AM 50.2 (H) 36.5 - 49.3 % Final     MCV   Date/Time Value Ref Range Status   2023 09:26 AM 90 82 - 98 fL Final     Platelets   Date/Time Value Ref Range Status   2023 09:26  149 - 390 Thousands/uL Final     Sodium   Date/Time Value Ref Range Status   2024 09:47  135 - 147 mmol/L Final     Potassium   Date/Time Value Ref Range Status   2024 09:47 AM 4.9 3.5 - 5.3 mmol/L Final     Chloride   Date/Time Value Ref Range Status   2024  09:47  96 - 108 mmol/L Final     CO2   Date/Time Value Ref Range Status   04/25/2024 09:47 AM 29 21 - 32 mmol/L Final     ANION GAP   Date/Time Value Ref Range Status   04/25/2024 09:47 AM 7 4 - 13 mmol/L Final     BUN   Date/Time Value Ref Range Status   04/25/2024 09:47 AM 16 5 - 25 mg/dL Final     Creatinine   Date/Time Value Ref Range Status   04/25/2024 09:47 AM 1.14 0.60 - 1.30 mg/dL Final     Comment:     Standardized to IDMS reference method     Glucose   Date/Time Value Ref Range Status   04/22/2019 10:12 AM 79 65 - 140 mg/dL Final     Comment:       If the patient is fasting, the ADA then defines impaired fasting glucose as > 100 mg/dL and diabetes as > or equal to 123 mg/dL.  Specimen collection should occur prior to Sulfasalazine administration due to the potential for falsely depressed results. Specimen collection should occur prior to Sulfapyridine administration due to the potential for falsely elevated results.     Calcium   Date/Time Value Ref Range Status   04/25/2024 09:47 AM 9.0 8.4 - 10.2 mg/dL Final     AST   Date/Time Value Ref Range Status   04/25/2024 09:47 AM 20 13 - 39 U/L Final     ALT   Date/Time Value Ref Range Status   04/25/2024 09:47 AM 18 7 - 52 U/L Final     Comment:     Specimen collection should occur prior to Sulfasalazine administration due to the potential for falsely depressed results.      Alkaline Phosphatase   Date/Time Value Ref Range Status   04/25/2024 09:47 AM 44 34 - 104 U/L Final     Total Protein   Date/Time Value Ref Range Status   04/25/2024 09:47 AM 6.5 6.4 - 8.4 g/dL Final     Albumin   Date/Time Value Ref Range Status   04/25/2024 09:47 AM 4.4 3.5 - 5.0 g/dL Final     Total Bilirubin   Date/Time Value Ref Range Status   04/25/2024 09:47 AM 0.71 0.20 - 1.00 mg/dL Final     Comment:     Use of this assay is not recommended for patients undergoing treatment with eltrombopag due to the potential for falsely elevated results.  N-acetyl-p-benzoquinone imine  (metabolite of Acetaminophen) will generate erroneously low results in samples for patients that have taken an overdose of Acetaminophen.     eGFR   Date/Time Value Ref Range Status   04/25/2024 09:47 AM 67 ml/min/1.73sq m Final       Hemoglobin A1C   Date/Time Value Ref Range Status   06/13/2023 09:40 AM 5.5 Normal 3.8-5.6%; PreDiabetic 5.7-6.4%; Diabetic >=6.5%; Glycemic control for adults with diabetes <7.0% % Final     Cholesterol   Date/Time Value Ref Range Status   06/13/2023 09:40  See Comment mg/dL Final     Comment:     Cholesterol:         Pediatric <18 Years        Desirable          <170 mg/dL      Borderline High    170-199 mg/dL      High               >=200 mg/dL        Adult >=18 Years            Desirable        <200 mg/dL      Borderline High  200-239 mg/dL      High             >239 mg/dL       LDL Calculated   Date/Time Value Ref Range Status   06/13/2023 09:40  (H) 0 - 100 mg/dL Final     Comment:     LDL Cholesterol:     Optimal           <100 mg/dl     Near Optimal      100-129 mg/dl     Above Optimal       Borderline High 130-159 mg/dl       High            160-189 mg/dl       Very High       >189 mg/dl         This screening LDL is a calculated result.   It does not have the accuracy of the Direct Measured LDL in the monitoring of patients with hyperlipidemia and/or statin therapy.   Direct Measure LDL ([id number]) must be ordered separately in these patients.     HDL, Direct   Date/Time Value Ref Range Status   06/13/2023 09:40 AM 61 >=40 mg/dL Final     Comment:     Specimen collection should occur prior to Metamizole administration due to the potential for falsley depressed results.     Triglycerides   Date/Time Value Ref Range Status   06/13/2023 09:40 AM 79 See Comment mg/dL Final     Comment:     Triglyceride:     0-9Y            <75mg/dL     10Y-17Y         <90 mg/dL       >=18Y     Normal          <150 mg/dL     Borderline High 150-199 mg/dL     High            200-499 mg/dL   "      Very High       >499 mg/dL    Specimen collection should occur prior to N-Acetylcysteine or Metamizole administration due to the potential for falsely depressed results.     Magnesium   Date/Time Value Ref Range Status   04/18/2019 06:48 AM 2.1 1.6 - 2.6 mg/dL Final       No results found for: \"PSAFREE\", \"PSA\"      Immunizations and preventive care screenings were discussed with patient today. Appropriate education was printed on patient's after visit summary.    Discussed risks and benefits of prostate cancer screening. We discussed the controversial history of PSA screening for prostate cancer in the [place] as well as the risk of over detection and over treatment of prostate cancer by way of PSA screening.  The patient understands that PSA blood testing is an imperfect way to screen for prostate cancer and that elevated PSA levels in the blood may also be caused by infection, inflammation, prostatic trauma or manipulation, urological procedures, or by benign prostatic enlargement.    The role of the digital rectal examination in prostate cancer screening was also discussed and I discussed with him that there is large interobserver variability in the findings of digital rectal examination.    Counseling:  Alcohol/drug use: discussed moderation in alcohol intake, the recommendations for healthy alcohol use, and avoidance of illicit drug use.  Dental Health: discussed importance of regular tooth brushing, flossing, and dental visits.  Injury prevention: discussed safety/seat belts, safety helmets, smoke detectors, carbon dioxide detectors, and smoking near bedding or upholstery.  Sexual health: discussed sexually transmitted diseases, partner selection, use of condoms, avoidance of unintended pregnancy, and contraceptive alternatives.  Exercise: the importance of regular exercise/physical activity was discussed. Recommend exercise 3-5 times per week for at least 30 minutes.     BMI Counseling: Body mass " index is 30.47 kg/m². The BMI is above normal. Nutrition recommendations include encouraging healthy choices of fruits and vegetables and limiting drinks that contain sugar. Exercise recommendations include moderate physical activity 150 minutes/week. Rationale for BMI follow-up plan is due to patient being overweight or obese.     Depression Screening and Follow-up Plan: Patient was screened for depression during today's encounter. They screened negative with a PHQ-2 score of 0.        No follow-ups on file.      Adult Annual Physical   Patient here for a comprehensive physical exam. The patient reports much improved healthy lifestyle including eating better regular meditation or centering and daily exercise like 1/2-hour walk.  He is also avoiding added sugars.    Diet and Physical Activity  Diet/Nutrition:  Low Na DASH. Also has lowered added sugars  Exercise: daily exercise     General Health  Sleep: Affirmations   Hearing: stable. Stable tinnitus. In background.   Vision: new glasses pending   Dental: brush & flosses      Depression Screening  PHQ-2/9 Depression Screening             Advanced Care Planning  Do you have an advanced directive? no  Do you have a durable medical power of ? no     Review of Systems:     Review of Systems   Past Medical History:     Past Medical History:   Diagnosis Date    Bradycardia 04/11/2019    Calculus of gallbladder and bile duct with obstruction without cholecystitis     Gallstone pancreatitis 04/10/2019    Added automatically from request for surgery [id number]    GERD (gastroesophageal reflux disease)     Hematemesis 04/11/2019    Hypertension     Pancreatitis     blocked by gall stones    Tick bite 08/15/2023    Wears glasses       Past Surgical History:     Past Surgical History:   Procedure Laterality Date    CHOLECYSTECTOMY      Lap    CHOLECYSTECTOMY LAPAROSCOPIC N/A 4/15/2019    Procedure: CHOLECYSTECTOMY LAPAROSCOPIC;  Surgeon: Nikolas Hood MD;  Location: [facility]  MAIN OR;  Service: General    EGD      ERCP N/A 4/15/2019    Procedure: ENDOSCOPIC RETROGRADE CHOLANGIOPANCREATOGRAPHY (ERCP);  Surgeon: Nikolas Hood MD;  Location: [facility]IN OR;  Service: General    WISDOM TOOTH EXTRACTION      x4      Family History:     Family History   Problem Relation Age of Onset    No Known Problems Mother     No Known Problems Father     No Known Problems Brother     No Known Problems Brother       Social History:     Social History     Socioeconomic History    Marital status: Single     Spouse name: Not on file    Number of children: Not on file    Years of education: Not on file    Highest education level: Not on file   Occupational History    Not on file   Tobacco Use    Smoking status: Never    Smokeless tobacco: Never   Vaping Use    Vaping status: Never Used   Substance and Sexual Activity    Alcohol use: Yes     Comment: Rarely, every 3-4 months    Drug use: Not Currently    Sexual activity: Not Currently     Partners: Male   Other Topics Concern    Not on file   Social History Narrative    Not on file     Social Determinants of Health     Financial Resource Strain: Not on file   Food Insecurity: Not on file   Transportation Needs: Not on file   Physical Activity: Not on file   Stress: Not on file   Social Connections: Not on file   Intimate Partner Violence: Not on file   Housing Stability: Not on file      Current Medications:     Current Outpatient Medications   Medication Sig Dispense Refill    ascorbic acid (VITAMIN C) 1000 MG tablet Take 1,000 mg by mouth as needed       B COMPLEX-BIOTIN-FA PO Take by mouth 3 (three) times a week       Blood Pressure Monitoring (B-D ASSURE BPM/AUTO ARM CUFF) MISC Check BP regularly 1 each 0    cholecalciferol (VITAMIN D3) 1,000 units tablet Take 1,000 Units by mouth daily      emtricitabine-tenofovir (TRUVADA) 200-300 mg per tablet       famotidine (PEPCID) 20 mg tablet Take 1 tablet (20 mg total) by mouth daily as needed for indigestion or  heartburn 30 tablet 5    hydrochlorothiazide (HYDRODIURIL) 25 mg tablet Take 1 tablet (25 mg total) by mouth daily 30 tablet 0    multivitamin (THERAGRAN) TABS Take 1 tablet by mouth 2 (two) times a week       polyethylene glycol (GOLYTELY) 4000 mL solution Take 4,000 mL by mouth once for 1 dose 4000 mL 0    ramipril (ALTACE) 10 MG capsule Take 1 capsule (10 mg total) by mouth 2 (two) times a day 180 capsule 0    tadalafil (CIALIS) 5 MG tablet Take 1 tablet (5 mg total) by mouth daily as needed for erectile dysfunction 15 tablet 3     No current facility-administered medications for this visit.      Allergies:     Allergies   Allergen Reactions    Azithromycin Diarrhea      Physical Exam:   Alert, No acute distress  /78 (BP Location: Left arm, Patient Position: Sitting, Cuff Size: Large)   Pulse 60   Temp 98.4 °F (36.9 °C) (Tympanic)   Ht 6' (1.829 m)   Wt 102 kg (224 lb 11.2 oz)   SpO2 98%   BMI 30.47 kg/m²       Head, ears, eyes, nose, throat, neck:  Head atraumatic, normocephalic. Conjunctiva clear, pupils equal and reactive to light  Throat: within normal limits  Tympanic membranes clear  Neck supple without concerning nodes, masses or thyromegaly    Respiratory: No respiratory distress. Lungs clear to auscultation  Cardiac: Heart regular rate and rhythm, normal S1 and S2, no murmur  Abdomen benign Soft and non-tender  Extremities: no cyanosis, clubbing or edema  Skin: no concerning lesions          Cheo Dangelo MD  [facility]

## 2024-05-08 ENCOUNTER — OFFICE VISIT (OUTPATIENT)
Age: 64
End: 2024-05-08

## 2024-05-08 VITALS
HEART RATE: 60 BPM | BODY MASS INDEX: 30.43 KG/M2 | OXYGEN SATURATION: 98 % | HEIGHT: 72 IN | TEMPERATURE: 98.4 F | DIASTOLIC BLOOD PRESSURE: 78 MMHG | SYSTOLIC BLOOD PRESSURE: 126 MMHG | WEIGHT: 224.7 LBS

## 2024-05-08 DIAGNOSIS — K21.00 GASTROESOPHAGEAL REFLUX DISEASE WITH ESOPHAGITIS: ICD-10-CM

## 2024-05-08 DIAGNOSIS — H93.13 BILATERAL TINNITUS: ICD-10-CM

## 2024-05-08 DIAGNOSIS — K64.9 HEMORRHOIDS, UNSPECIFIED HEMORRHOID TYPE: ICD-10-CM

## 2024-05-08 DIAGNOSIS — R73.9 HYPERGLYCEMIA: ICD-10-CM

## 2024-05-08 DIAGNOSIS — I10 PRIMARY HYPERTENSION: Primary | ICD-10-CM

## 2024-05-08 DIAGNOSIS — E78.2 MIXED HYPERLIPIDEMIA: ICD-10-CM

## 2024-05-08 DIAGNOSIS — Z79.899 ON PRE-EXPOSURE PROPHYLAXIS FOR HIV: ICD-10-CM

## 2024-05-08 DIAGNOSIS — J98.4 PULMONARY OSSIFICATION: ICD-10-CM

## 2024-05-08 DIAGNOSIS — K21.9 GASTROESOPHAGEAL REFLUX DISEASE WITHOUT ESOPHAGITIS: ICD-10-CM

## 2024-05-08 PROCEDURE — 99396 PREV VISIT EST AGE 40-64: CPT | Performed by: FAMILY MEDICINE

## 2024-07-22 DIAGNOSIS — I10 PRIMARY HYPERTENSION: ICD-10-CM

## 2024-07-22 RX ORDER — RAMIPRIL 5 MG/1
5 CAPSULE ORAL 3 TIMES DAILY
Qty: 90 CAPSULE | Refills: 3 | Status: SHIPPED | OUTPATIENT
Start: 2024-07-22

## 2024-07-30 ENCOUNTER — TELEPHONE (OUTPATIENT)
Age: 64
End: 2024-07-30

## 2024-11-26 DIAGNOSIS — I10 PRIMARY HYPERTENSION: ICD-10-CM

## 2024-11-26 RX ORDER — RAMIPRIL 5 MG/1
CAPSULE ORAL
Qty: 90 CAPSULE | Refills: 0 | Status: SHIPPED | OUTPATIENT
Start: 2024-11-26

## 2024-12-31 ENCOUNTER — APPOINTMENT (OUTPATIENT)
Dept: LAB | Facility: CLINIC | Age: 64
End: 2024-12-31

## 2025-01-14 ENCOUNTER — APPOINTMENT (OUTPATIENT)
Dept: LAB | Facility: CLINIC | Age: 65
End: 2025-01-14
Payer: COMMERCIAL

## 2025-01-15 ENCOUNTER — RESULTS FOLLOW-UP (OUTPATIENT)
Age: 65
End: 2025-01-15

## 2025-02-17 DIAGNOSIS — I10 PRIMARY HYPERTENSION: ICD-10-CM

## 2025-02-17 RX ORDER — RAMIPRIL 5 MG/1
CAPSULE ORAL
Qty: 90 CAPSULE | Refills: 0 | Status: SHIPPED | OUTPATIENT
Start: 2025-02-17

## 2025-03-11 DIAGNOSIS — I10 PRIMARY HYPERTENSION: ICD-10-CM

## 2025-03-11 RX ORDER — RAMIPRIL 5 MG/1
CAPSULE ORAL
Qty: 90 CAPSULE | Refills: 0 | Status: SHIPPED | OUTPATIENT
Start: 2025-03-11 | End: 2025-03-18 | Stop reason: SDUPTHER

## 2025-03-18 DIAGNOSIS — I10 PRIMARY HYPERTENSION: ICD-10-CM

## 2025-03-18 RX ORDER — RAMIPRIL 5 MG/1
CAPSULE ORAL
Qty: 270 CAPSULE | Refills: 3 | Status: SHIPPED | OUTPATIENT
Start: 2025-03-18

## 2025-04-22 DIAGNOSIS — I10 PRIMARY HYPERTENSION: ICD-10-CM

## 2025-04-22 RX ORDER — RAMIPRIL 5 MG/1
CAPSULE ORAL
Qty: 30 CAPSULE | Refills: 0 | Status: SHIPPED | OUTPATIENT
Start: 2025-04-22 | End: 2025-04-26 | Stop reason: SDUPTHER

## 2025-04-26 DIAGNOSIS — I10 PRIMARY HYPERTENSION: ICD-10-CM

## 2025-04-26 RX ORDER — RAMIPRIL 5 MG/1
CAPSULE ORAL
Qty: 90 CAPSULE | Refills: 1 | Status: SHIPPED | OUTPATIENT
Start: 2025-04-26

## 2025-07-02 DIAGNOSIS — I10 PRIMARY HYPERTENSION: ICD-10-CM

## 2025-07-02 RX ORDER — RAMIPRIL 5 MG/1
CAPSULE ORAL
Qty: 30 CAPSULE | Refills: 0 | Status: SHIPPED | OUTPATIENT
Start: 2025-07-02 | End: 2025-07-09 | Stop reason: SDUPTHER

## 2025-07-02 NOTE — TELEPHONE ENCOUNTER
Please refill Rx if appropriate.     Clerical - please schedule annual physical. Last seen > 1 year ago.

## 2025-07-06 DIAGNOSIS — I10 PRIMARY HYPERTENSION: ICD-10-CM

## 2025-07-07 RX ORDER — RAMIPRIL 5 MG/1
CAPSULE ORAL
Qty: 30 CAPSULE | Refills: 0 | OUTPATIENT
Start: 2025-07-07

## 2025-07-09 DIAGNOSIS — I10 PRIMARY HYPERTENSION: ICD-10-CM

## 2025-07-09 RX ORDER — RAMIPRIL 5 MG/1
CAPSULE ORAL
Qty: 270 CAPSULE | Refills: 0 | Status: SHIPPED | OUTPATIENT
Start: 2025-07-09

## (undated) DEVICE — SINGLE-USE BIOPSY FORCEPS: Brand: RADIAL JAW 4

## (undated) DEVICE — LIGAMAX 5 MM ENDOSCOPIC MULTIPLE CLIP APPLIER: Brand: LIGAMAX

## (undated) DEVICE — INJECTION SYTEM RAPID REFILL RX

## (undated) DEVICE — RETRIEVAL BALLOON CATHETER: Brand: EXTRACTOR™ PRO RX

## (undated) DEVICE — SPHINCTEROTOME: Brand: DREAMTOME™ RX 44